# Patient Record
Sex: MALE | Race: WHITE | Employment: FULL TIME | URBAN - METROPOLITAN AREA
[De-identification: names, ages, dates, MRNs, and addresses within clinical notes are randomized per-mention and may not be internally consistent; named-entity substitution may affect disease eponyms.]

---

## 2017-01-05 RX ORDER — LOSARTAN POTASSIUM 50 MG/1
TABLET ORAL
Qty: 30 TABLET | Refills: 0 | Status: SHIPPED | OUTPATIENT
Start: 2017-01-05 | End: 2017-03-02 | Stop reason: SDUPTHER

## 2017-03-02 ENCOUNTER — OFFICE VISIT (OUTPATIENT)
Dept: FAMILY MEDICINE CLINIC | Age: 52
End: 2017-03-02

## 2017-03-02 VITALS
RESPIRATION RATE: 16 BRPM | DIASTOLIC BLOOD PRESSURE: 84 MMHG | HEIGHT: 69 IN | WEIGHT: 252.38 LBS | HEART RATE: 100 BPM | BODY MASS INDEX: 37.38 KG/M2 | OXYGEN SATURATION: 97 % | SYSTOLIC BLOOD PRESSURE: 110 MMHG

## 2017-03-02 DIAGNOSIS — I10 ESSENTIAL HYPERTENSION: ICD-10-CM

## 2017-03-02 DIAGNOSIS — R73.9 HYPERGLYCEMIA: ICD-10-CM

## 2017-03-02 DIAGNOSIS — Z00.00 WELL ADULT EXAM: Primary | ICD-10-CM

## 2017-03-02 PROCEDURE — 99396 PREV VISIT EST AGE 40-64: CPT | Performed by: FAMILY MEDICINE

## 2017-03-02 RX ORDER — ALBUTEROL SULFATE 90 UG/1
2 AEROSOL, METERED RESPIRATORY (INHALATION) 4 TIMES DAILY PRN
Qty: 3 INHALER | Refills: 1 | Status: SHIPPED | OUTPATIENT
Start: 2017-03-02 | End: 2018-05-07 | Stop reason: SDUPTHER

## 2017-03-02 RX ORDER — LOSARTAN POTASSIUM 50 MG/1
TABLET ORAL
Qty: 30 TABLET | Refills: 11 | Status: SHIPPED | OUTPATIENT
Start: 2017-03-02 | End: 2018-03-03 | Stop reason: SDUPTHER

## 2018-02-06 ENCOUNTER — HOSPITAL ENCOUNTER (OUTPATIENT)
Dept: OTHER | Age: 53
Discharge: HOME | End: 2018-02-06
Attending: FAMILY MEDICINE | Admitting: FAMILY MEDICINE

## 2018-02-06 LAB
A/G RATIO: 1.3 (ref 1.1–2.2)
ALBUMIN SERPL-MCNC: 4.1 G/DL (ref 3.4–5)
ALP BLD-CCNC: 65 U/L (ref 40–129)
ALT SERPL-CCNC: 45 U/L (ref 10–40)
ANION GAP SERPL CALCULATED.3IONS-SCNC: 11 MMOL/L (ref 3–16)
AST SERPL-CCNC: 24 U/L (ref 15–37)
BILIRUB SERPL-MCNC: 0.6 MG/DL (ref 0–1)
BUN BLDV-MCNC: 16 MG/DL (ref 7–20)
CALCIUM SERPL-MCNC: 9.5 MG/DL (ref 8.3–10.6)
CHLORIDE BLD-SCNC: 99 MMOL/L (ref 99–110)
CHOLESTEROL, TOTAL: 165 MG/DL (ref 0–199)
CO2: 28 MMOL/L (ref 21–32)
CREAT SERPL-MCNC: 0.9 MG/DL (ref 0.9–1.3)
ESTIMATED AVERAGE GLUCOSE: 257.5 MG/DL
GFR AFRICAN AMERICAN: >60
GFR NON-AFRICAN AMERICAN: >60
GLOBULIN: 3.2 G/DL
GLUCOSE BLD-MCNC: 387 MG/DL (ref 70–99)
HBA1C MFR BLD: 10.6 %
HDLC SERPL-MCNC: 27 MG/DL (ref 40–60)
LDL CHOLESTEROL CALCULATED: 87 MG/DL
POTASSIUM SERPL-SCNC: 3.9 MMOL/L (ref 3.5–5.1)
SODIUM BLD-SCNC: 138 MMOL/L (ref 136–145)
TOTAL PROTEIN: 7.3 G/DL (ref 6.4–8.2)
TRIGL SERPL-MCNC: 255 MG/DL (ref 0–150)
VLDLC SERPL CALC-MCNC: 51 MG/DL

## 2018-03-03 ENCOUNTER — OFFICE VISIT (OUTPATIENT)
Dept: FAMILY MEDICINE CLINIC | Age: 53
End: 2018-03-03

## 2018-03-03 VITALS
HEART RATE: 84 BPM | HEIGHT: 68 IN | DIASTOLIC BLOOD PRESSURE: 84 MMHG | OXYGEN SATURATION: 98 % | WEIGHT: 258 LBS | BODY MASS INDEX: 39.1 KG/M2 | SYSTOLIC BLOOD PRESSURE: 128 MMHG

## 2018-03-03 DIAGNOSIS — E11.9 TYPE 2 DIABETES MELLITUS WITHOUT COMPLICATION, WITHOUT LONG-TERM CURRENT USE OF INSULIN (HCC): ICD-10-CM

## 2018-03-03 DIAGNOSIS — I10 ESSENTIAL HYPERTENSION: Primary | ICD-10-CM

## 2018-03-03 DIAGNOSIS — G47.30 SLEEP APNEA, UNSPECIFIED TYPE: ICD-10-CM

## 2018-03-03 PROCEDURE — 99214 OFFICE O/P EST MOD 30 MIN: CPT | Performed by: FAMILY MEDICINE

## 2018-03-03 RX ORDER — METFORMIN HYDROCHLORIDE 500 MG/1
1000 TABLET, EXTENDED RELEASE ORAL
Qty: 60 TABLET | Refills: 5 | Status: SHIPPED | OUTPATIENT
Start: 2018-03-03 | End: 2018-04-24 | Stop reason: DRUGHIGH

## 2018-03-03 RX ORDER — LOSARTAN POTASSIUM 50 MG/1
TABLET ORAL
Qty: 30 TABLET | Refills: 11 | Status: SHIPPED | OUTPATIENT
Start: 2018-03-03 | End: 2018-04-06 | Stop reason: SDUPTHER

## 2018-03-03 NOTE — PROGRESS NOTES
Subjective:      Patient ID: Elisabeth Rucker is a 50 y.o. male. HPI patient presents today for his annual physical and medication check up. Patient is very compliant with the blood pressure medication. He occasionally uses an albuterol inhaler. Patient intimately uses tramadol and Naprosyn for back pain. Patient is currently working 60-70 hours a week. Patient has temporary employment status. He is walking a fair amount with his job 3-4 miles every night. Patient denies any chest pain or shortness of breath symptoms. Review of Systems   Patient Active Problem List   Diagnosis    Essential hypertension    Obesity    Sleep apnea    Hyperglycemia        Outpatient Prescriptions Marked as Taking for the 3/3/18 encounter (Office Visit) with Sergio Brasher MD   Medication Sig Dispense Refill           losartan (COZAAR) 50 MG tablet TAKE ONE TABLET BY MOUTH DAILY 30 tablet 11    traMADol (ULTRAM) 50 MG tablet Take 1 tablet by mouth every 8 hours as needed for Pain 5 tablet 0    albuterol sulfate HFA (PROAIR HFA) 108 (90 BASE) MCG/ACT inhaler Inhale 2 puffs into the lungs 4 times daily as needed for Wheezing 3 Inhaler 1    naproxen (NAPROSYN) 500 MG tablet Take 1 tablet by mouth 2 times daily (with meals) 60 tablet 5    glucose blood VI test strips (ONE TOUCH ULTRA TEST) strip Tests once daily 100 each 3    cyclobenzaprine (FLEXERIL) 10 MG tablet Take 1 tablet by mouth 3 times daily as needed. 90 tablet 0    Blood Glucose Monitoring Suppl (ONE TOUCH ULTRA) by Does not apply route.  famotidine (PEPCID) 20 MG tablet Take 20 mg by mouth daily.            Allergies   Allergen Reactions    Ace Inhibitors      COUGH      Pollen Extract        Social History   Substance Use Topics    Smoking status: Never Smoker    Smokeless tobacco: Never Used    Alcohol use Yes      Comment: occasional alcholo use       /84   Pulse 84   Ht 5' 8\" (1.727 m)   Wt 258 lb (117 kg)   SpO2 98%   BMI 39.23

## 2018-03-03 NOTE — PATIENT INSTRUCTIONS
good, quick way to make sure that you have a balanced meal. It also helps you spread carbs throughout the day. ¨ Divide your plate by types of foods. Put non-starchy vegetables on half the plate, meat or other protein food on one-quarter of the plate, and a grain or starchy vegetable in the final quarter of the plate. To this you can add a small piece of fruit and 1 cup of milk or yogurt, depending on how many carbs you are supposed to eat at a meal.  · Try to eat about the same amount of carbs at each meal. Do not \"save up\" your daily allowance of carbs to eat at one meal.  · Proteins have very little or no carbs per serving. Examples of proteins are beef, chicken, turkey, fish, eggs, tofu, cheese, cottage cheese, and peanut butter. A serving size of meat is 3 ounces, which is about the size of a deck of cards. Examples of meat substitute serving sizes (equal to 1 ounce of meat) are 1/4 cup of cottage cheese, 1 egg, 1 tablespoon of peanut butter, and ½ cup of tofu. How can you eat out and still eat healthy? · Learn to estimate the serving sizes of foods that have carbohydrate. If you measure food at home, it will be easier to estimate the amount in a serving of restaurant food. · If the meal you order has too much carbohydrate (such as potatoes, corn, or baked beans), ask to have a low-carbohydrate food instead. Ask for a salad or green vegetables. · If you use insulin, check your blood sugar before and after eating out to help you plan how much to eat in the future. · If you eat more carbohydrate at a meal than you had planned, take a walk or do other exercise. This will help lower your blood sugar. What else should you know? · Limit saturated fat, such as the fat from meat and dairy products. This is a healthy choice because people who have diabetes are at higher risk of heart disease. So choose lean cuts of meat and nonfat or low-fat dairy products.  Use olive or canola oil instead of butter or shortening test?  The health professional taking a sample of your blood will:  · Wrap an elastic band around your upper arm. This makes the veins below the band larger so it is easier to put a needle into the vein. · Clean the needle site with alcohol. · Put the needle into the vein. · Attach a tube to the needle to fill it with blood. · Remove the band from your arm when enough blood is collected. · Put a gauze pad or cotton ball over the needle site as the needle is removed. · Put pressure on the site and then put on a bandage. What else should you know about the test?  The test result is usually given as a percentage. The normal A1c is less than 5.7%. You have a higher risk for diabetes if your A1c is 5.7% to 6.4%. If your level is 6.5% or higher, you have diabetes. The A1c test result also can be used to find your estimated average glucose, or eAG. Your eAG and A1c show the same thing in two different ways. They both help you learn more about your average blood sugar range over the past 2 to 3 months. A1c is shown as a percentage, while eAG uses the same units (mg/dl) as your glucose meter. Examples:  · 6% A1c = 126 mg/dL  · 7% A1c = 154 mg/dL  · 8% A1c = 183 mg/dL  · 9% A1c = 212 mg/dL  · 10% A1c = 240 mg/dL  · 11% A1c = 269 mg/dL  · 12% A1c = 298 mg/dL  Where can you learn more? Go to [email or web address] and sign in to your [employer] account. Enter U216 in the [facility] box to learn more about \"Hemoglobin A1c: About This Test.\"     If you do not have an account, please click on the \"Sign Up Now\" link. Current as of: March 13, 2017  Content Version: 11.5  © 9125-2858 [employer]wise, [employer]orporated. Care instructions adapted under license by [facility] ([facility]). If you have questions about a medical condition or this instruction, always ask your healthcare professional. [place] [address] any warranty or liability for your use of this information.

## 2018-04-06 RX ORDER — LOSARTAN POTASSIUM 50 MG/1
TABLET ORAL
Qty: 30 TABLET | Refills: 11 | Status: SHIPPED | OUTPATIENT
Start: 2018-04-06 | End: 2019-04-15 | Stop reason: SDUPTHER

## 2018-04-24 ENCOUNTER — TELEPHONE (OUTPATIENT)
Dept: FAMILY MEDICINE CLINIC | Age: 53
End: 2018-04-24

## 2018-04-24 RX ORDER — GLIMEPIRIDE 2 MG/1
2 TABLET ORAL EVERY MORNING
Qty: 30 TABLET | Refills: 0 | Status: SHIPPED | OUTPATIENT
Start: 2018-04-24 | End: 2018-05-22 | Stop reason: SDUPTHER

## 2018-04-24 RX ORDER — METFORMIN HYDROCHLORIDE 500 MG/1
500 TABLET, EXTENDED RELEASE ORAL
Qty: 30 TABLET | Refills: 5 | Status: SHIPPED | OUTPATIENT
Start: 2018-04-24 | End: 2018-12-13 | Stop reason: SDUPTHER

## 2018-04-25 ENCOUNTER — TELEPHONE (OUTPATIENT)
Dept: FAMILY MEDICINE CLINIC | Age: 53
End: 2018-04-25

## 2018-05-07 RX ORDER — ALBUTEROL SULFATE 90 UG/1
2 AEROSOL, METERED RESPIRATORY (INHALATION) 4 TIMES DAILY PRN
Qty: 3 INHALER | Refills: 2 | Status: SHIPPED | OUTPATIENT
Start: 2018-05-07

## 2018-05-22 RX ORDER — GLIMEPIRIDE 2 MG/1
2 TABLET ORAL EVERY MORNING
Qty: 30 TABLET | Refills: 3 | Status: SHIPPED | OUTPATIENT
Start: 2018-05-22 | End: 2018-11-05 | Stop reason: SDUPTHER

## 2018-07-05 ENCOUNTER — OFFICE VISIT (OUTPATIENT)
Dept: FAMILY MEDICINE CLINIC | Age: 53
End: 2018-07-05

## 2018-07-05 VITALS
OXYGEN SATURATION: 97 % | HEART RATE: 86 BPM | DIASTOLIC BLOOD PRESSURE: 72 MMHG | WEIGHT: 251 LBS | SYSTOLIC BLOOD PRESSURE: 108 MMHG | BODY MASS INDEX: 38.16 KG/M2

## 2018-07-05 DIAGNOSIS — J45.20 MILD INTERMITTENT ASTHMA WITHOUT COMPLICATION: ICD-10-CM

## 2018-07-05 DIAGNOSIS — I10 ESSENTIAL HYPERTENSION: ICD-10-CM

## 2018-07-05 DIAGNOSIS — E11.9 TYPE 2 DIABETES MELLITUS WITHOUT COMPLICATION, WITHOUT LONG-TERM CURRENT USE OF INSULIN (HCC): Primary | ICD-10-CM

## 2018-07-05 PROCEDURE — 99214 OFFICE O/P EST MOD 30 MIN: CPT | Performed by: FAMILY MEDICINE

## 2018-07-05 ASSESSMENT — PATIENT HEALTH QUESTIONNAIRE - PHQ9
2. FEELING DOWN, DEPRESSED OR HOPELESS: 0
SUM OF ALL RESPONSES TO PHQ9 QUESTIONS 1 & 2: 0
SUM OF ALL RESPONSES TO PHQ QUESTIONS 1-9: 0
1. LITTLE INTEREST OR PLEASURE IN DOING THINGS: 0

## 2018-07-05 NOTE — PROGRESS NOTES
laboratory profiling. Maintain albuterol inhaler on a when necessary basis for asthma. Patient received counseling on the following healthy behaviors: nutrition and exercise     Patient given educational materials     Health maintenance updated    Discussed use, benefit, and side effects of prescribed medications. Barriers to medication compliance addressed. All patient questions answered. Pt voiced understanding. Patient needs RTC in 4 months. Please note that this chart was generated using Dragon dictation software. Although every effort was made to ensure the accuracy of this automated transcription, some errors in transcription may have occurred.

## 2018-11-05 RX ORDER — GLIMEPIRIDE 2 MG/1
2 TABLET ORAL EVERY MORNING
Qty: 30 TABLET | Refills: 1 | Status: SHIPPED | OUTPATIENT
Start: 2018-11-05 | End: 2018-12-13 | Stop reason: SDUPTHER

## 2018-12-13 ENCOUNTER — OFFICE VISIT (OUTPATIENT)
Dept: FAMILY MEDICINE CLINIC | Age: 53
End: 2018-12-13
Payer: COMMERCIAL

## 2018-12-13 VITALS
HEART RATE: 92 BPM | OXYGEN SATURATION: 97 % | SYSTOLIC BLOOD PRESSURE: 112 MMHG | HEIGHT: 70 IN | BODY MASS INDEX: 37.56 KG/M2 | DIASTOLIC BLOOD PRESSURE: 90 MMHG | RESPIRATION RATE: 20 BRPM | WEIGHT: 262.38 LBS

## 2018-12-13 DIAGNOSIS — G47.30 SLEEP APNEA, UNSPECIFIED TYPE: ICD-10-CM

## 2018-12-13 DIAGNOSIS — I10 ESSENTIAL HYPERTENSION: ICD-10-CM

## 2018-12-13 DIAGNOSIS — E11.9 TYPE 2 DIABETES MELLITUS WITHOUT COMPLICATION, WITHOUT LONG-TERM CURRENT USE OF INSULIN (HCC): Primary | ICD-10-CM

## 2018-12-13 PROCEDURE — 99214 OFFICE O/P EST MOD 30 MIN: CPT | Performed by: FAMILY MEDICINE

## 2018-12-13 RX ORDER — GLIMEPIRIDE 2 MG/1
2 TABLET ORAL EVERY MORNING
Qty: 30 TABLET | Refills: 5 | Status: SHIPPED | OUTPATIENT
Start: 2018-12-13 | End: 2019-10-15 | Stop reason: SDUPTHER

## 2018-12-13 RX ORDER — METFORMIN HYDROCHLORIDE 500 MG/1
500 TABLET, EXTENDED RELEASE ORAL
Qty: 30 TABLET | Refills: 5 | Status: SHIPPED | OUTPATIENT
Start: 2018-12-13 | End: 2019-07-31 | Stop reason: SDUPTHER

## 2018-12-13 NOTE — PROGRESS NOTES
Subjective:      Patient ID: Trisha Soto is a 48 y.o. male. CC: Patient presents for re-evaluation of chronic health problems including diabetes mellitus, hypertension, sleep apnea and asthma. HPI pt is here for a follow up on his diabetes, med refill. Patient states he is currently unemployed and going to school. But it is tight and therefore is not his laboratory evaluation performed as of yet. He denies any issues with asthma for quite some time. Patient does use CPAP for sleep apnea. Eye exam current (within one year): Yes    Checks sugars at home: yes  Home blood sugar records: patient tests 1 time(s) per day  Any episodes of hypoglycemia? No    Current medication use: taking as prescribed  Medication side effects: none     Current diet: in general, an \"unhealthy\" diet  Current exercise: Moderaly active     Review of Systems  Patient Active Problem List   Diagnosis    Essential hypertension    Obesity    Sleep apnea    Type 2 diabetes mellitus without complication, without long-term current use of insulin ([address]    Mild intermittent asthma without complication       Outpatient Prescriptions Marked as Taking for the 12/13/18 encounter (Office Visit) with Bob Mg MD   Medication Sig Dispense Refill    glimepiride (AMARYL) 2 MG tablet Take 1 tablet by mouth every morning 30 tablet 1    albuterol sulfate HFA (PROAIR HFA) 108 (90 Base) MCG/ACT inhaler Inhale 2 puffs into the lungs 4 times daily as needed for Wheezing 3 Inhaler 2    metFORMIN (GLUCOPHAGE XR) 500 MG extended release tablet Take 1 tablet by mouth daily (with breakfast) 30 tablet 5    losartan (COZAAR) 50 MG tablet TAKE ONE TABLET BY MOUTH DAILY 30 tablet 11    traMADol (ULTRAM) 50 MG tablet Take 1 tablet by mouth every 8 hours as needed for Pain 5 tablet 0    naproxen (NAPROSYN) 500 MG tablet Take 1 tablet by mouth 2 times daily (with meals) (Patient taking differently: Take 500 mg by mouth 2 times daily as needed ) 60 management of diabetes mellitus after laboratory testing. Patient received counseling on the following healthy behaviors: nutrition and exercise and weight loss    Patient given educational materials     Health maintenance updated    Discussed use, benefit, and side effects of prescribed medications. Barriers to medication compliance addressed. All patient questions answered. Pt voiced understanding. Patient needs RTC in 4 months. Please note that this chart was generated using Dragon dictation software. Although every effort was made to ensure the accuracy of this automated transcription, some errors in transcription may have occurred.             Saintclair Alcide

## 2019-04-02 ENCOUNTER — OFFICE VISIT (OUTPATIENT)
Dept: FAMILY MEDICINE CLINIC | Age: 54
End: 2019-04-02

## 2019-04-02 VITALS
TEMPERATURE: 99 F | DIASTOLIC BLOOD PRESSURE: 78 MMHG | SYSTOLIC BLOOD PRESSURE: 130 MMHG | BODY MASS INDEX: 37.28 KG/M2 | OXYGEN SATURATION: 99 % | RESPIRATION RATE: 16 BRPM | WEIGHT: 256.13 LBS | HEART RATE: 75 BPM

## 2019-04-02 DIAGNOSIS — R10.9 ABDOMINAL CRAMPING: ICD-10-CM

## 2019-04-02 DIAGNOSIS — R11.2 NON-INTRACTABLE VOMITING WITH NAUSEA, UNSPECIFIED VOMITING TYPE: Primary | ICD-10-CM

## 2019-04-02 PROCEDURE — 96372 THER/PROPH/DIAG INJ SC/IM: CPT | Performed by: PHYSICIAN ASSISTANT

## 2019-04-02 PROCEDURE — 99213 OFFICE O/P EST LOW 20 MIN: CPT | Performed by: PHYSICIAN ASSISTANT

## 2019-04-02 RX ORDER — PROMETHAZINE HYDROCHLORIDE 25 MG/ML
25 INJECTION, SOLUTION INTRAMUSCULAR; INTRAVENOUS ONCE
Status: COMPLETED | OUTPATIENT
Start: 2019-04-02 | End: 2019-04-02

## 2019-04-02 RX ORDER — HYOSCYAMINE SULFATE 0.125 MG
125 TABLET ORAL EVERY 4 HOURS PRN
Qty: 50 TABLET | Refills: 3 | Status: SHIPPED | OUTPATIENT
Start: 2019-04-02

## 2019-04-02 RX ORDER — PROMETHAZINE HYDROCHLORIDE 25 MG/ML
25 INJECTION, SOLUTION INTRAMUSCULAR; INTRAVENOUS ONCE
Qty: 1 ML | Refills: 0
Start: 2019-04-02 | End: 2019-04-02

## 2019-04-02 RX ORDER — ONDANSETRON 4 MG/1
4 TABLET, ORALLY DISINTEGRATING ORAL EVERY 8 HOURS PRN
Qty: 25 TABLET | Refills: 1 | Status: SHIPPED | OUTPATIENT
Start: 2019-04-02 | End: 2019-04-15

## 2019-04-02 RX ADMIN — PROMETHAZINE HYDROCHLORIDE 25 MG: 25 INJECTION, SOLUTION INTRAMUSCULAR; INTRAVENOUS at 11:54

## 2019-04-02 ASSESSMENT — ENCOUNTER SYMPTOMS
ABDOMINAL PAIN: 1
DIARRHEA: 0
VOMITING: 1
NAUSEA: 1
SHORTNESS OF BREATH: 0
CONSTIPATION: 0
BACK PAIN: 0

## 2019-04-02 NOTE — PROGRESS NOTES
Subjective:      Patient ID: Uriah Sullivan is a 48 y.o. male. HPI  Patient is here today for a 2 day history of abdominal pain, n/v. He camped Saturday night into Sunday. Sunday evening when he got home, he started feeling nauseous. He started vomiting late Sunday night into Monday through this morning. He ate crackers, soda, nuts, turkey, cheese, water. No fever. No diarrhea. His last BM was yesterday afternoon and reports that it was normal. No black or bloody stool. He as periodic abdominal cramping as well. Review of Systems   Constitutional: Negative for appetite change, chills and fever. Respiratory: Negative for shortness of breath. Cardiovascular: Negative for chest pain. Gastrointestinal: Positive for abdominal pain, nausea and vomiting. Negative for constipation and diarrhea. Genitourinary: Negative for difficulty urinating, dysuria, frequency, hematuria and urgency. Musculoskeletal: Negative for back pain. Objective:   Physical Exam   Constitutional: He is oriented to person, place, and time. Vital signs are normal. He appears well-developed and well-nourished. He is cooperative. Cardiovascular: Normal rate, regular rhythm and normal heart sounds. Pulmonary/Chest: Effort normal and breath sounds normal. He has no decreased breath sounds. He has no wheezes. He has no rhonchi. He has no rales. Abdominal: Soft. Normal appearance and bowel sounds are normal. He exhibits no distension and no mass. There is no hepatosplenomegaly. There is generalized tenderness. There is no rigidity, no rebound, no guarding and no CVA tenderness. No hernia. Neurological: He is alert and oriented to person, place, and time. Assessment:      Deonna Silverman was seen today for constipation, abdominal cramping and emesis. Diagnoses and all orders for this visit:    Non-intractable vomiting with nausea, unspecified vomiting type  -     ondansetron (ZOFRAN ODT) 4 MG disintegrating tablet;  Take 1 tablet by mouth every 8 hours as needed for Nausea or Vomiting  -     hyoscyamine (LEVSIN) 125 MCG tablet; Take 1 tablet by mouth every 4 hours as needed for Cramping    Abdominal cramping  -     hyoscyamine (LEVSIN) 125 MCG tablet; Take 1 tablet by mouth every 4 hours as needed for Cramping  -     promethazine (PHENERGAN) 25 MG/ML injection; Inject 1 mL into the muscle once for 1 dose             Plan:          Symptomatic treatment, fluids then advance to bland diet once keeping fluids down, call with any concerns.      She Quach

## 2019-04-02 NOTE — PATIENT INSTRUCTIONS
Matthew Brown was seen today for constipation, abdominal cramping and emesis. Diagnoses and all orders for this visit:    Non-intractable vomiting with nausea, unspecified vomiting type  -     ondansetron (ZOFRAN ODT) 4 MG disintegrating tablet; Take 1 tablet by mouth every 8 hours as needed for Nausea or Vomiting  -     hyoscyamine (LEVSIN) 125 MCG tablet; Take 1 tablet by mouth every 4 hours as needed for Cramping    Abdominal cramping  -     hyoscyamine (LEVSIN) 125 MCG tablet; Take 1 tablet by mouth every 4 hours as needed for Cramping  -     promethazine (PHENERGAN) 25 MG/ML injection; Inject 1 mL into the muscle once for 1 dose          Liquids (water, gatorade, pedialyte), no soda. Then once you are hungry then just bland food for now. This will last 2-5 days.

## 2019-04-15 ENCOUNTER — OFFICE VISIT (OUTPATIENT)
Dept: FAMILY MEDICINE CLINIC | Age: 54
End: 2019-04-15

## 2019-04-15 VITALS
HEART RATE: 110 BPM | WEIGHT: 258.38 LBS | OXYGEN SATURATION: 97 % | SYSTOLIC BLOOD PRESSURE: 142 MMHG | RESPIRATION RATE: 16 BRPM | BODY MASS INDEX: 37.61 KG/M2 | DIASTOLIC BLOOD PRESSURE: 92 MMHG

## 2019-04-15 DIAGNOSIS — J45.20 MILD INTERMITTENT ASTHMA WITHOUT COMPLICATION: ICD-10-CM

## 2019-04-15 DIAGNOSIS — I10 ESSENTIAL HYPERTENSION: ICD-10-CM

## 2019-04-15 DIAGNOSIS — G47.30 SLEEP APNEA, UNSPECIFIED TYPE: ICD-10-CM

## 2019-04-15 DIAGNOSIS — E11.9 TYPE 2 DIABETES MELLITUS WITHOUT COMPLICATION, WITHOUT LONG-TERM CURRENT USE OF INSULIN (HCC): Primary | ICD-10-CM

## 2019-04-15 DIAGNOSIS — M54.12 CERVICAL RADICULOPATHY: ICD-10-CM

## 2019-04-15 PROCEDURE — 99214 OFFICE O/P EST MOD 30 MIN: CPT | Performed by: FAMILY MEDICINE

## 2019-04-15 RX ORDER — LOSARTAN POTASSIUM 50 MG/1
TABLET ORAL
Qty: 30 TABLET | Refills: 11 | Status: SHIPPED | OUTPATIENT
Start: 2019-04-15 | End: 2020-07-20

## 2019-04-15 ASSESSMENT — PATIENT HEALTH QUESTIONNAIRE - PHQ9
SUM OF ALL RESPONSES TO PHQ QUESTIONS 1-9: 0
1. LITTLE INTEREST OR PLEASURE IN DOING THINGS: 0
2. FEELING DOWN, DEPRESSED OR HOPELESS: 0
SUM OF ALL RESPONSES TO PHQ QUESTIONS 1-9: 0
SUM OF ALL RESPONSES TO PHQ9 QUESTIONS 1 & 2: 0

## 2019-04-15 NOTE — PROGRESS NOTES
Subjective:      Patient ID: Maria Isabel Peraza is a 48 y.o. male. CC: Patient presents for re-evaluation of chronic health problems including , hypertension, sleep apnea and right shoulder pain. HPI Pt is here for a follow up, med refill, and will like to discuss right shoulder pain, ongoing for 4 months. Pt states this is a stabbing pain if moved the wrong way, stretching bothers him. Pt has been taking tylenol with moderate relief. Patient is not his laboratory testing done as he states he simply couldn't afford to get it done. He carries a heavy pack on his right shoulder and thinks this causes pain. He had difficulty with his neck being very stiff and sore. Sometimes the discomfort will extend down to his little finger. Patient states his symptoms have improved and he's been taking ibuprofen on an intermittent basis. Patient does wear CPAP for sleep apnea. Patient is very compliant with medications with no adverse reactions. Eye exam current (within one year): Yes    Checks sugars at home: yes  Home blood sugar records: patient tests 1 time(s) per day avg 117-180  Any episodes of hypoglycemia?  No    Current medication use: taking as prescribed  Medication side effects: none     Current diet: in general, an \"unhealthy\" diet  Current exercise:moderately active     Review of Systems  Patient Active Problem List   Diagnosis    Essential hypertension    Obesity    Sleep apnea    Type 2 diabetes mellitus without complication, without long-term current use of insulin (HCC)    Mild intermittent asthma without complication       Outpatient Medications Marked as Taking for the 4/15/19 encounter (Office Visit) with Pee Amaro MD   Medication Sig Dispense Refill    ondansetron (ZOFRAN ODT) 4 MG disintegrating tablet Take 1 tablet by mouth every 8 hours as needed for Nausea or Vomiting 25 tablet 1    hyoscyamine (LEVSIN) 125 MCG tablet Take 1 tablet by mouth every 4 hours as needed for Cramping 50 tablet 3    glimepiride (AMARYL) 2 MG tablet Take 1 tablet by mouth every morning 30 tablet 5    metFORMIN (GLUCOPHAGE XR) 500 MG extended release tablet Take 1 tablet by mouth daily (with breakfast) 30 tablet 5    albuterol sulfate HFA (PROAIR HFA) 108 (90 Base) MCG/ACT inhaler Inhale 2 puffs into the lungs 4 times daily as needed for Wheezing 3 Inhaler 2    losartan (COZAAR) 50 MG tablet TAKE ONE TABLET BY MOUTH DAILY 30 tablet 11    traMADol (ULTRAM) 50 MG tablet Take 1 tablet by mouth every 8 hours as needed for Pain 5 tablet 0    naproxen (NAPROSYN) 500 MG tablet Take 1 tablet by mouth 2 times daily (with meals) (Patient taking differently: Take 500 mg by mouth 2 times daily as needed ) 60 tablet 5    glucose blood VI test strips (ONE TOUCH ULTRA TEST) strip Tests once daily 100 each 3    Blood Glucose Monitoring Suppl (ONE TOUCH ULTRA) by Does not apply route.  famotidine (PEPCID) 20 MG tablet Take 20 mg by mouth daily. Allergies   Allergen Reactions    Ace Inhibitors      COUGH      Pollen Extract        Social History     Tobacco Use    Smoking status: Never Smoker    Smokeless tobacco: Never Used   Substance Use Topics    Alcohol use: Yes     Comment: occasional alcholo use       BP (!) 142/92 (Site: Right Upper Arm, Position: Sitting, Cuff Size: Medium Adult)   Pulse 110   Resp 16   Wt 258 lb 6 oz (117.2 kg)   SpO2 97%   BMI 37.61 kg/m²     Objective:   Physical Exam   Constitutional: He is oriented to person, place, and time. He appears well-developed and well-nourished. He is cooperative. No distress. Neck: Carotid bruit is not present. Cardiovascular: Normal rate, regular rhythm, normal heart sounds and intact distal pulses. No murmur heard. Pulses:       Dorsalis pedis pulses are 2+ on the right side, and 2+ on the left side. Posterior tibial pulses are 2+ on the right side, and 2+ on the left side.    Pulmonary/Chest: Effort normal and breath sounds normal.   Musculoskeletal: Normal range of motion. He exhibits no edema. Right shoulder: He exhibits no tenderness, no swelling and no deformity. Left shoulder: He exhibits no tenderness, no swelling and no deformity. Cervical back: He exhibits spasm. He exhibits no swelling and no deformity. Right foot: Normal.        Left foot: Normal.   Neurological: He is alert and oriented to person, place, and time. He has normal strength and normal reflexes. No sensory deficit. Reflex Scores:       Tricep reflexes are 2+ on the right side and 2+ on the left side. Bicep reflexes are 2+ on the right side and 2+ on the left side. 12 point monofilament test normal    Skin: Skin is warm. No rash noted. Back Exam     Tenderness   The patient is experiencing tenderness in the cervical.    Range of Motion   Extension: normal   Flexion: normal   Rotation right: normal   Rotation left: normal             Assessment:      Leah Arizmendi was seen today for follow-up. Diagnoses and all orders for this visit:    Type 2 diabetes mellitus without complication, without long-term current use of insulin (HCC)  -     Hemoglobin A1C; Future  -     Basic Metabolic Panel; Future    Essential hypertension    Mild intermittent asthma without complication    Sleep apnea, unspecified type    Cervical radiculopathy    Other orders  -     losartan (COZAAR) 50 MG tablet; TAKE ONE TABLET BY MOUTH DAILY            Plan:      Laboratory profiling ordered  No change to medication until after profile available  Continue using neck range of motion exercises and over-the-counter ibuprofen on a when necessary basis  Continue with CPAP for sleep apnea  RTC 6 months    Please note that this chart was generated using Dragon dictation software. Although every effort was made to ensure the accuracy of this automated transcription, some errors in transcription may have occurred.

## 2019-07-31 RX ORDER — METFORMIN HYDROCHLORIDE 500 MG/1
500 TABLET, EXTENDED RELEASE ORAL
Qty: 30 TABLET | Refills: 2 | Status: SHIPPED | OUTPATIENT
Start: 2019-07-31 | End: 2019-08-22 | Stop reason: SDUPTHER

## 2019-08-22 RX ORDER — METFORMIN HYDROCHLORIDE 500 MG/1
500 TABLET, EXTENDED RELEASE ORAL
Qty: 30 TABLET | Refills: 2 | Status: SHIPPED | OUTPATIENT
Start: 2019-08-22 | End: 2019-10-15 | Stop reason: SDUPTHER

## 2019-10-15 ENCOUNTER — HOSPITAL ENCOUNTER (OUTPATIENT)
Age: 54
Discharge: HOME | End: 2019-10-15
Payer: COMMERCIAL

## 2019-10-15 ENCOUNTER — OFFICE VISIT (OUTPATIENT)
Dept: FAMILY MEDICINE CLINIC | Age: 54
End: 2019-10-15
Payer: COMMERCIAL

## 2019-10-15 VITALS
WEIGHT: 265 LBS | SYSTOLIC BLOOD PRESSURE: 120 MMHG | DIASTOLIC BLOOD PRESSURE: 70 MMHG | BODY MASS INDEX: 38.57 KG/M2 | OXYGEN SATURATION: 97 % | HEART RATE: 97 BPM

## 2019-10-15 DIAGNOSIS — G47.30 SLEEP APNEA, UNSPECIFIED TYPE: ICD-10-CM

## 2019-10-15 DIAGNOSIS — E11.9 TYPE 2 DIABETES MELLITUS WITHOUT COMPLICATION, WITHOUT LONG-TERM CURRENT USE OF INSULIN (HCC): ICD-10-CM

## 2019-10-15 DIAGNOSIS — I10 ESSENTIAL HYPERTENSION: ICD-10-CM

## 2019-10-15 DIAGNOSIS — E11.9 TYPE 2 DIABETES MELLITUS WITHOUT COMPLICATION, WITHOUT LONG-TERM CURRENT USE OF INSULIN (HCC): Primary | ICD-10-CM

## 2019-10-15 DIAGNOSIS — J06.9 URI, ACUTE: ICD-10-CM

## 2019-10-15 LAB
A/G RATIO: 1.5 (ref 1.1–2.2)
ALBUMIN SERPL-MCNC: 4.5 G/DL (ref 3.4–5)
ALP BLD-CCNC: 49 U/L (ref 40–129)
ALT SERPL-CCNC: 49 U/L (ref 10–40)
ANION GAP SERPL CALCULATED.3IONS-SCNC: 14 MMOL/L (ref 3–16)
AST SERPL-CCNC: 30 U/L (ref 15–37)
BILIRUB SERPL-MCNC: 0.6 MG/DL (ref 0–1)
BUN BLDV-MCNC: 19 MG/DL (ref 7–20)
CALCIUM SERPL-MCNC: 9.9 MG/DL (ref 8.3–10.6)
CHLORIDE BLD-SCNC: 105 MMOL/L (ref 99–110)
CHOLESTEROL, TOTAL: 148 MG/DL (ref 0–199)
CO2: 24 MMOL/L (ref 21–32)
CREAT SERPL-MCNC: 1.2 MG/DL (ref 0.9–1.3)
GFR AFRICAN AMERICAN: >60
GFR NON-AFRICAN AMERICAN: >60
GLOBULIN: 3.1 G/DL
GLUCOSE BLD-MCNC: 141 MG/DL (ref 70–99)
HDLC SERPL-MCNC: 37 MG/DL (ref 40–60)
LDL CHOLESTEROL CALCULATED: 74 MG/DL
POTASSIUM SERPL-SCNC: 4.5 MMOL/L (ref 3.5–5.1)
SODIUM BLD-SCNC: 143 MMOL/L (ref 136–145)
TOTAL PROTEIN: 7.6 G/DL (ref 6.4–8.2)
TRIGL SERPL-MCNC: 186 MG/DL (ref 0–150)
VLDLC SERPL CALC-MCNC: 37 MG/DL

## 2019-10-15 PROCEDURE — 80053 COMPREHEN METABOLIC PANEL: CPT

## 2019-10-15 PROCEDURE — 80061 LIPID PANEL: CPT

## 2019-10-15 PROCEDURE — 83036 HEMOGLOBIN GLYCOSYLATED A1C: CPT

## 2019-10-15 PROCEDURE — 36415 COLL VENOUS BLD VENIPUNCTURE: CPT

## 2019-10-15 PROCEDURE — 99214 OFFICE O/P EST MOD 30 MIN: CPT | Performed by: FAMILY MEDICINE

## 2019-10-15 RX ORDER — GLIMEPIRIDE 2 MG/1
2 TABLET ORAL EVERY MORNING
Qty: 30 TABLET | Refills: 5 | Status: SHIPPED | OUTPATIENT
Start: 2019-10-15 | End: 2020-06-12

## 2019-10-15 RX ORDER — METFORMIN HYDROCHLORIDE 500 MG/1
500 TABLET, EXTENDED RELEASE ORAL
Qty: 30 TABLET | Refills: 5 | Status: SHIPPED | OUTPATIENT
Start: 2019-10-15 | End: 2020-05-18

## 2019-10-15 RX ORDER — HYOSCYAMINE SULFATE 0.125 MG
125 TABLET ORAL EVERY 4 HOURS PRN
Qty: 50 TABLET | Refills: 3 | Status: CANCELLED | OUTPATIENT
Start: 2019-10-15

## 2019-10-16 LAB
ESTIMATED AVERAGE GLUCOSE: 148.5 MG/DL
HBA1C MFR BLD: 6.8 %

## 2019-12-27 RX ORDER — VALSARTAN 80 MG/1
80 TABLET ORAL DAILY
Qty: 30 TABLET | Refills: 1 | Status: SHIPPED | OUTPATIENT
Start: 2019-12-27 | End: 2020-01-28

## 2020-01-28 RX ORDER — VALSARTAN 80 MG/1
TABLET ORAL
Qty: 30 TABLET | Refills: 2 | Status: SHIPPED | OUTPATIENT
Start: 2020-01-28 | End: 2020-10-02

## 2020-02-04 ENCOUNTER — OFFICE VISIT (OUTPATIENT)
Dept: FAMILY MEDICINE CLINIC | Age: 55
End: 2020-02-04
Payer: COMMERCIAL

## 2020-02-04 VITALS
TEMPERATURE: 100 F | BODY MASS INDEX: 37.7 KG/M2 | OXYGEN SATURATION: 96 % | WEIGHT: 259 LBS | HEART RATE: 126 BPM | DIASTOLIC BLOOD PRESSURE: 82 MMHG | SYSTOLIC BLOOD PRESSURE: 118 MMHG

## 2020-02-04 LAB
INFLUENZA A ANTIGEN, POC: NEGATIVE
INFLUENZA B ANTIGEN, POC: NEGATIVE

## 2020-02-04 PROCEDURE — 99213 OFFICE O/P EST LOW 20 MIN: CPT | Performed by: PHYSICIAN ASSISTANT

## 2020-02-04 PROCEDURE — 87804 INFLUENZA ASSAY W/OPTIC: CPT | Performed by: PHYSICIAN ASSISTANT

## 2020-02-04 RX ORDER — OSELTAMIVIR PHOSPHATE 75 MG/1
75 CAPSULE ORAL 2 TIMES DAILY
Qty: 10 CAPSULE | Refills: 0 | Status: SHIPPED | OUTPATIENT
Start: 2020-02-04 | End: 2020-02-09

## 2020-02-04 ASSESSMENT — ENCOUNTER SYMPTOMS
NAUSEA: 0
COUGH: 1
WHEEZING: 0
DIARRHEA: 0
RHINORRHEA: 1
EYE PAIN: 0
VOMITING: 0
SORE THROAT: 1

## 2020-02-04 NOTE — PROGRESS NOTES
Subjective:      Patient ID: Cecily Brittle is a 48 y.o. male. HPI Patient is here today with a 1 day history of body aches, dry cough, ST, fatigue, runny nose, fever. Grandson tested positive for flu this morning. No n/v/d. He has taken coricidin, mucinex and cough drops. He is a nonsmoker. Review of Systems   Constitutional: Positive for appetite change, fatigue and fever. HENT: Positive for congestion, rhinorrhea and sore throat. Negative for ear pain. Eyes: Negative for pain. Respiratory: Positive for cough. Negative for wheezing. Gastrointestinal: Negative for diarrhea, nausea and vomiting. Musculoskeletal: Positive for myalgias. Skin: Negative for rash. Neurological: Positive for headaches. Negative for dizziness. Objective:   Physical Exam  Vitals signs reviewed. Constitutional:       General: He is not in acute distress. Appearance: Normal appearance. He is well-developed and well-groomed. HENT:      Head: Normocephalic. Right Ear: Tympanic membrane and ear canal normal.      Left Ear: Tympanic membrane and ear canal normal.      Nose: Mucosal edema, congestion and rhinorrhea present. Right Sinus: No maxillary sinus tenderness or frontal sinus tenderness. Left Sinus: No maxillary sinus tenderness or frontal sinus tenderness. Mouth/Throat:      Pharynx: Oropharynx is clear. Uvula midline. Neck:      Musculoskeletal: Neck supple. Cardiovascular:      Rate and Rhythm: Normal rate and regular rhythm. Heart sounds: Normal heart sounds. Pulmonary:      Effort: Pulmonary effort is normal. No respiratory distress. Breath sounds: Normal breath sounds. Lymphadenopathy:      Cervical: No cervical adenopathy. Skin:     General: Skin is warm and dry. Neurological:      Mental Status: He is alert and oriented to person, place, and time. Psychiatric:         Behavior: Behavior is cooperative.          Assessment:      Pop Woodson was seen today for uri. Diagnoses and all orders for this visit:    Influenza  -     oseltamivir (TAMIFLU) 75 MG capsule; Take 1 capsule by mouth 2 times daily for 5 days  -     POCT Influenza A/B Antigen (BD Veritor)             Plan:      Treat fever prn, call with any concerns.          Anita Frankel, [place]

## 2020-05-18 RX ORDER — METFORMIN HYDROCHLORIDE 500 MG/1
TABLET, EXTENDED RELEASE ORAL
Qty: 30 TABLET | Refills: 0 | Status: SHIPPED | OUTPATIENT
Start: 2020-05-18 | End: 2020-06-02 | Stop reason: SDUPTHER

## 2020-06-02 RX ORDER — METFORMIN HYDROCHLORIDE 500 MG/1
TABLET, EXTENDED RELEASE ORAL
Qty: 90 TABLET | Refills: 0 | Status: SHIPPED | OUTPATIENT
Start: 2020-06-02 | End: 2020-08-31

## 2020-06-12 RX ORDER — GLIMEPIRIDE 2 MG/1
TABLET ORAL
Qty: 90 TABLET | Refills: 0 | Status: SHIPPED | OUTPATIENT
Start: 2020-06-12 | End: 2020-09-03

## 2020-07-20 RX ORDER — LOSARTAN POTASSIUM 50 MG/1
TABLET ORAL
Qty: 90 TABLET | Refills: 0 | Status: SHIPPED | OUTPATIENT
Start: 2020-07-20 | End: 2020-10-02 | Stop reason: SDUPTHER

## 2020-08-31 RX ORDER — METFORMIN HYDROCHLORIDE 500 MG/1
TABLET, EXTENDED RELEASE ORAL
Qty: 30 TABLET | Refills: 0 | Status: SHIPPED | OUTPATIENT
Start: 2020-08-31 | End: 2020-10-02 | Stop reason: SDUPTHER

## 2020-09-03 ENCOUNTER — TELEPHONE (OUTPATIENT)
Dept: FAMILY MEDICINE CLINIC | Age: 55
End: 2020-09-03

## 2020-09-04 RX ORDER — GLIMEPIRIDE 2 MG/1
TABLET ORAL
Qty: 30 TABLET | Refills: 0 | Status: SHIPPED | OUTPATIENT
Start: 2020-09-04 | End: 2020-10-02 | Stop reason: SDUPTHER

## 2020-09-25 ENCOUNTER — OFFICE VISIT (OUTPATIENT)
Dept: FAMILY MEDICINE CLINIC | Age: 55
End: 2020-09-25
Payer: COMMERCIAL

## 2020-09-25 ENCOUNTER — HOSPITAL ENCOUNTER (OUTPATIENT)
Age: 55
Discharge: HOME | End: 2020-09-25
Payer: COMMERCIAL

## 2020-09-25 ENCOUNTER — NURSE TRIAGE (OUTPATIENT)
Dept: OTHER | Facility: CLINIC | Age: 55
End: 2020-09-25

## 2020-09-25 VITALS
SYSTOLIC BLOOD PRESSURE: 116 MMHG | WEIGHT: 254 LBS | OXYGEN SATURATION: 98 % | TEMPERATURE: 99.3 F | BODY MASS INDEX: 36.97 KG/M2 | HEART RATE: 103 BPM | DIASTOLIC BLOOD PRESSURE: 74 MMHG

## 2020-09-25 LAB
ANION GAP SERPL CALCULATED.3IONS-SCNC: 12 MMOL/L (ref 3–16)
BACTERIA URINE, POC: ABNORMAL
BILIRUBIN URINE: ABNORMAL
BLOOD, URINE: POSITIVE
BUN BLDV-MCNC: 27 MG/DL (ref 7–20)
CALCIUM SERPL-MCNC: 9.5 MG/DL (ref 8.3–10.6)
CASTS URINE, POC: ABNORMAL
CHLORIDE BLD-SCNC: 103 MMOL/L (ref 99–110)
CLARITY: ABNORMAL
CO2: 24 MMOL/L (ref 21–32)
COLOR: ABNORMAL
CREAT SERPL-MCNC: 1.3 MG/DL (ref 0.9–1.3)
CRYSTALS URINE, POC: ABNORMAL
EPI CELLS URINE, POC: ABNORMAL
GFR AFRICAN AMERICAN: >60
GFR NON-AFRICAN AMERICAN: 58
GLUCOSE BLD-MCNC: 88 MG/DL (ref 70–99)
GLUCOSE URINE: ABNORMAL
KETONES, URINE: NEGATIVE
LEUKOCYTE EST, POC: ABNORMAL
NITRITE, URINE: NEGATIVE
PH UA: 5.5 (ref 4.5–8)
POTASSIUM SERPL-SCNC: 4.1 MMOL/L (ref 3.5–5.1)
PROTEIN UA: POSITIVE
RBC URINE, POC: ABNORMAL
SODIUM BLD-SCNC: 139 MMOL/L (ref 136–145)
SPECIFIC GRAVITY UA: 1.02 (ref 1–1.03)
UROBILINOGEN, URINE: NORMAL
WBC URINE, POC: ABNORMAL
YEAST URINE, POC: ABNORMAL

## 2020-09-25 PROCEDURE — 36415 COLL VENOUS BLD VENIPUNCTURE: CPT

## 2020-09-25 PROCEDURE — 99213 OFFICE O/P EST LOW 20 MIN: CPT | Performed by: FAMILY MEDICINE

## 2020-09-25 PROCEDURE — 81000 URINALYSIS NONAUTO W/SCOPE: CPT | Performed by: FAMILY MEDICINE

## 2020-09-25 PROCEDURE — 80048 BASIC METABOLIC PNL TOTAL CA: CPT

## 2020-09-25 PROCEDURE — 83036 HEMOGLOBIN GLYCOSYLATED A1C: CPT

## 2020-09-25 RX ORDER — CIPROFLOXACIN 500 MG/1
500 TABLET, FILM COATED ORAL 2 TIMES DAILY
Qty: 14 TABLET | Refills: 0 | Status: SHIPPED | OUTPATIENT
Start: 2020-09-25 | End: 2020-10-02

## 2020-09-25 ASSESSMENT — PATIENT HEALTH QUESTIONNAIRE - PHQ9
2. FEELING DOWN, DEPRESSED OR HOPELESS: 0
SUM OF ALL RESPONSES TO PHQ QUESTIONS 1-9: 0
1. LITTLE INTEREST OR PLEASURE IN DOING THINGS: 0
SUM OF ALL RESPONSES TO PHQ9 QUESTIONS 1 & 2: 0
SUM OF ALL RESPONSES TO PHQ QUESTIONS 1-9: 0

## 2020-09-25 NOTE — PATIENT INSTRUCTIONS
Patient Education        Blood in the Urine: Care Instructions  Your Care Instructions     Blood in the urine, or hematuria, may make the urine look red, brown, or pink. There may be blood every time you urinate or just from time to time. You cannot always see blood in the urine, but it will show up in a urine test.  Blood in the urine may be serious. It should always be checked by a doctor. Your doctor may recommend more tests, including an X-ray, a CT scan, or a cystoscopy (which lets a doctor look inside the urethra and bladder). Blood in the urine can be a sign of another problem. Common causes are bladder infections and kidney stones. An injury to your groin or your genital area can also cause bleeding in the urinary tract. Very hard exercise--such as running a marathon--can cause blood in the urine. Blood in the urine can also be a sign of kidney disease or cancer in the bladder or kidney. Many cases of blood in the urine are caused by a harmless condition that runs in families. This is called benign familial hematuria. It does not need any treatment. Sometimes your urine may look red or brown even though it does not contain blood. For example, not getting enough fluids (dehydration), taking certain medicines, or having a liver problem can change the color of your urine. Eating foods such as beets, rhubarb, or blackberries or foods with red food coloring can make your urine look red or pink. Follow-up care is a key part of your treatment and safety. Be sure to make and go to all appointments, and call your doctor if you are having problems. It's also a good idea to know your test results and keep a list of the medicines you take. When should you call for help? Call your doctor now or seek immediate medical care if:  · You have symptoms of a urinary infection. For example:  ? You have pus in your urine. ? You have pain in your back just below your rib cage. This is called flank pain. ?  You have a

## 2020-09-25 NOTE — TELEPHONE ENCOUNTER
Reason for Disposition   Pain or burning with passing urine (urination)    Answer Assessment - Initial Assessment Questions  1. COLOR of URINE: \"Describe the color of the urine. \"  (e.g., tea-colored, pink, red, blood clots, bloody)      At first was dark red, now appears pink  2. ONSET: \"When did the bleeding start? \"       9/24 around 7pm  3. EPISODES: \"How many times has there been blood in the urine? \" or \"How many times today?\"      4x  4. PAIN with URINATION: \"Is there any pain with passing your urine? \" If so, ask: \"How bad is the pain? \"  (Scale 1-10; or mild, moderate, severe)     - MILD - complains slightly about urination hurting     - MODERATE - interferes with normal activities       - SEVERE - excruciating, unwilling or unable to urinate because of the pain       2/10  5. FEVER: \"Do you have a fever? \" If so, ask: \"What is your temperature, how was it measured, and when did it start? \"      denies  6. ASSOCIATED SYMPTOMS: Luiza Muzzy you passing urine more frequently than usual?\"      Yes, urinary urgency  7. OTHER SYMPTOMS: \"Do you have any other symptoms? \" (e.g., back/flank pain, abdominal pain, vomiting)      Vomiting and diarrhea  8. PREGNANCY: \"Is there any chance you are pregnant? \" \"When was your last menstrual period? \"      no    Protocols used: URINE - BLOOD IN-ADULT-[place]    Caller provided care advice and instructed to call back with worsening symptoms. Spoke to Cristina Clinton to schedule apt.

## 2020-09-25 NOTE — PROGRESS NOTES
Subjective:      Patient ID: Berenice Fraga is a 50 y.o. male. CC: Patient presents for acute medical problem-grossly bloody urine. Medical assistant notes reviewed. HPI Patient presents with blood in his urine with some dysuria since yesterday. Patient is complain of some lower abdominal discomfort but no real severe pain. There is been no reported fevers or chills. Review of Systems     Allergies   Allergen Reactions    Ace Inhibitors      COUGH      Pollen Extract        Objective:   Physical Exam  Constitutional:       General: He is not in acute distress. Appearance: Normal appearance. He is well-developed. Abdominal:      General: Bowel sounds are normal. There is no distension. Palpations: Abdomen is soft. Abdomen is not rigid. There is no hepatomegaly, splenomegaly or mass. Tenderness: There is abdominal tenderness in the right lower quadrant, suprapubic area and left lower quadrant. There is no right CVA tenderness, left CVA tenderness, guarding or rebound. Hernia: No hernia is present. Skin:     General: Skin is warm. Findings: No rash. Neurological:      Mental Status: He is alert. Mental status is at baseline. Psychiatric:         Behavior: Behavior is cooperative. Assessment:      Lety Vazquez was seen today for hematuria. Diagnoses and all orders for this visit:    Gross hematuria    Dysuria  -     POC URINE with Microscopic    Type 2 diabetes mellitus without complication, without long-term current use of insulin (HCC)  -     Hemoglobin A1C; Future  -     Basic Metabolic Panel; Future    Other orders  -     ciprofloxacin (CIPRO) 500 MG tablet; Take 1 tablet by mouth 2 times daily for 7 days            Plan:      Patient was advised that we would treat his urine as an infection but he should be reassessed in the next 2 weeks if his urine continues to be grossly bloody.   We did discuss at that point in time he would need an abdominal CT and possible urology consultation      Patient is due for follow-up in regards to his diabetes management

## 2020-09-26 LAB
ESTIMATED AVERAGE GLUCOSE: 111.2 MG/DL
HBA1C MFR BLD: 5.5 %

## 2020-10-02 ENCOUNTER — OFFICE VISIT (OUTPATIENT)
Dept: FAMILY MEDICINE CLINIC | Age: 55
End: 2020-10-02
Payer: COMMERCIAL

## 2020-10-02 VITALS
HEIGHT: 69 IN | OXYGEN SATURATION: 98 % | DIASTOLIC BLOOD PRESSURE: 74 MMHG | BODY MASS INDEX: 36.73 KG/M2 | HEART RATE: 93 BPM | WEIGHT: 248 LBS | SYSTOLIC BLOOD PRESSURE: 132 MMHG | RESPIRATION RATE: 16 BRPM | TEMPERATURE: 98.4 F

## 2020-10-02 PROCEDURE — 99214 OFFICE O/P EST MOD 30 MIN: CPT | Performed by: FAMILY MEDICINE

## 2020-10-02 RX ORDER — GLIMEPIRIDE 2 MG/1
TABLET ORAL
Qty: 90 TABLET | Refills: 1 | Status: SHIPPED | OUTPATIENT
Start: 2020-10-02 | End: 2021-06-29

## 2020-10-02 RX ORDER — ASPIRIN 81 MG/1
81 TABLET ORAL DAILY
COMMUNITY

## 2020-10-02 RX ORDER — LOSARTAN POTASSIUM 50 MG/1
TABLET ORAL
Qty: 90 TABLET | Refills: 1 | Status: SHIPPED | OUTPATIENT
Start: 2020-10-02 | End: 2021-06-29

## 2020-10-02 RX ORDER — METFORMIN HYDROCHLORIDE 500 MG/1
500 TABLET, EXTENDED RELEASE ORAL DAILY
Qty: 90 TABLET | Refills: 1 | Status: SHIPPED | OUTPATIENT
Start: 2020-10-02 | End: 2021-06-01

## 2020-10-02 NOTE — PROGRESS NOTES
Subjective:      Patient ID: Olga Grider is a 50 y.o. male. CC: Patient presents for re-evaluation of chronic health problems including diabetes mellitus, hypertension, sleep apnea and asthma. HPI Pt is here for a follow up, med refill, test results. Patient was recently evaluated with gross hematuria and he states this is resolved and he was having lower abdominal discomfort this is also resolved. He has completed antibiotic therapy. Otherwise is been a year since he was evaluated for his medical problems. He does continue using CPAP for sleep apnea. He rarely has to use an inhaler. He denies any chest pain or shortness of breath with activity. Eye exam current (within one year): Yes    Checks sugars at home: yes  Home blood sugar records: patient tests 1 time(s) per day  Any episodes of hypoglycemia?  Yes    Current medication use: taking as prescribed  Medication side effects: none     Current diet: well balanced  Current exercise:very active    Review of Systems  Patient Active Problem List   Diagnosis    Essential hypertension    Obesity    Sleep apnea    Type 2 diabetes mellitus without complication, without long-term current use of insulin (HCC)    Mild intermittent asthma without complication       Outpatient Medications Marked as Taking for the 10/2/20 encounter (Office Visit) with Lubna Cedeno MD   Medication Sig Dispense Refill    aspirin 81 MG EC tablet Take 81 mg by mouth daily      ciprofloxacin (CIPRO) 500 MG tablet Take 1 tablet by mouth 2 times daily for 7 days 14 tablet 0    glimepiride (AMARYL) 2 MG tablet TAKE 1 TABLET BY MOUTH EVERY DAY IN THE MORNING 30 tablet 0    metFORMIN (GLUCOPHAGE-XR) 500 MG extended release tablet TAKE 1 TABLET BY MOUTH EVERY DAY WITH BREAKFAST 30 tablet 0    losartan (COZAAR) 50 MG tablet TAKE 1 TABLET BY MOUTH EVERY DAY 90 tablet 0    hyoscyamine (LEVSIN) 125 MCG tablet Take 1 tablet by mouth every 4 hours as needed for Cramping 50 tablet 3    albuterol sulfate HFA (PROAIR HFA) 108 (90 Base) MCG/ACT inhaler Inhale 2 puffs into the lungs 4 times daily as needed for Wheezing 3 Inhaler 2    naproxen (NAPROSYN) 500 MG tablet Take 1 tablet by mouth 2 times daily (with meals) (Patient taking differently: Take 500 mg by mouth 2 times daily as needed ) 60 tablet 5    glucose blood VI test strips (ONE TOUCH ULTRA TEST) strip Tests once daily 100 each 3    Blood Glucose Monitoring Suppl (ONE TOUCH ULTRA) by Does not apply route.  famotidine (PEPCID) 20 MG tablet Take 20 mg by mouth daily. Allergies   Allergen Reactions    Ace Inhibitors      COUGH      Pollen Extract        Social History     Tobacco Use    Smoking status: Never Smoker    Smokeless tobacco: Never Used   Substance Use Topics    Alcohol use: Yes     Comment: occasional alcholo use       /74 (Site: Right Upper Arm, Position: Sitting, Cuff Size: Large Adult)   Pulse 93   Temp 98.4 °F (36.9 °C) (Tympanic)   Resp 16   Ht 5' 9\" (1.753 m)   Wt 248 lb (112.5 kg)   SpO2 98%   BMI 36.62 kg/m²     Objective:   Physical Exam  Constitutional:       General: He is not in acute distress. Appearance: He is well-developed. Neck:      Vascular: No carotid bruit. Cardiovascular:      Rate and Rhythm: Normal rate and regular rhythm. Pulses:           Dorsalis pedis pulses are 2+ on the right side and 2+ on the left side. Posterior tibial pulses are 2+ on the right side and 2+ on the left side. Heart sounds: Normal heart sounds. No murmur. No friction rub. No gallop. Pulmonary:      Effort: Pulmonary effort is normal.      Breath sounds: Normal breath sounds. Musculoskeletal: Normal range of motion. Right lower leg: No edema. Left lower leg: No edema. Right foot: Normal.      Left foot: Normal.   Neurological:      Mental Status: He is alert. Sensory: Sensation is intact. Motor: Motor function is intact.       Deep Tendon Reflexes: Reflexes are normal and symmetric. Comments: 12 point monofilament test normal    Psychiatric:         Behavior: Behavior is cooperative. Assessment:      Jennifer Toth was seen today for follow-up, diabetes and hypertension. Diagnoses and all orders for this visit:    Type 2 diabetes mellitus without complication, without long-term current use of insulin ([facility])  -     Comprehensive Metabolic Panel - Vitros; Future  -     Hemoglobin A1C; Future  -     Lipid Panel; Future  -     Microalbumin / Creatinine Urine Ratio; Future    Essential hypertension    Sleep apnea, unspecified type    Mild intermittent asthma without complication    Other orders  -     glimepiride (AMARYL) 2 MG tablet; TAKE 1 TABLET BY MOUTH EVERY DAY IN THE MORNING  -     losartan (COZAAR) 50 MG tablet; TAKE 1 TABLET BY MOUTH EVERY DAY  -     metFORMIN (GLUCOPHAGE-XR) 500 MG extended release tablet; Take 1 tablet by mouth daily            Plan:      Pt appears stable & reviewed labs with patient. Patient received counseling on the following healthy behaviors: nutrition and exercise     Patient given educational materials     Health maintenance updated    Discussed use, benefit, and side effects of prescribed medications. Barriers to medication compliance addressed. All patient questions answered. Pt voiced understanding. Patient needs RTC in 6 months. Please note that this chart was generated using Dragon dictation software. Although every effort was made to ensure the accuracy of this automated transcription, some errors in transcription may have occurred.

## 2020-10-02 NOTE — PATIENT INSTRUCTIONS
again.  Preventing future kidney stones  Some changes in your diet may help prevent kidney stones. Depending on the cause of your stones, your doctor may recommend that you:  · Drink plenty of fluids, enough so that your urine is light yellow or clear like water. If you have kidney, heart, or liver disease and have to limit fluids, talk with your doctor before you increase the amount of fluids you drink. · Limit coffee, tea, and alcohol. Also avoid grapefruit juice. · Do not take more than the recommended daily dose of vitamins C and D.  · Avoid antacids such as Gaviscon, Maalox, Mylanta, or Tums. · Limit the amount of salt (sodium) in your diet. · Eat a balanced diet that is not too high in protein. · Limit foods that are high in a substance called oxalate, which can cause kidney stones. These foods include dark green vegetables, rhubarb, chocolate, wheat bran, nuts, cranberries, and beans. When should you call for help? Call your doctor now or seek immediate medical care if:  · You cannot keep down fluids. · Your pain gets worse. · You have a fever or chills. · You have new or worse pain in your back just below your rib cage (the flank area). · You have new or more blood in your urine. Watch closely for changes in your health, and be sure to contact your doctor if:  · You do not get better as expected. Where can you learn more? Go to [email or web address] and sign in to your [employer] account. Enter I251 in the [facility] box to learn more about \"Kidney Stone: Care Instructions. \"     If you do not have an account, please click on the \"Sign Up Now\" link. Current as of: August 12, 2019               Content Version: 12.5  © 5655-0629 Healthwise, Incorporated. Care instructions adapted under license by [facility] ([facility]).  If you have questions about a medical condition or this instruction, always ask your healthcare professional. [address] any warranty or liability for your use of this information.

## 2020-10-16 ENCOUNTER — HOSPITAL ENCOUNTER (OUTPATIENT)
Dept: CT IMAGING | Age: 55
Discharge: HOME | End: 2020-10-16
Payer: COMMERCIAL

## 2020-10-16 PROCEDURE — 74176 CT ABD & PELVIS W/O CONTRAST: CPT

## 2021-06-01 RX ORDER — METFORMIN HYDROCHLORIDE 500 MG/1
TABLET, EXTENDED RELEASE ORAL
Qty: 30 TABLET | Refills: 0 | Status: SHIPPED | OUTPATIENT
Start: 2021-06-01 | End: 2021-06-30 | Stop reason: SDUPTHER

## 2021-06-08 ENCOUNTER — HOSPITAL ENCOUNTER (OUTPATIENT)
Age: 56
Discharge: HOME | End: 2021-06-08
Payer: COMMERCIAL

## 2021-06-08 ENCOUNTER — HOSPITAL ENCOUNTER (OUTPATIENT)
Dept: GENERAL RADIOLOGY | Age: 56
Discharge: HOME | End: 2021-06-08
Payer: COMMERCIAL

## 2021-06-08 DIAGNOSIS — N20.0 CALCULUS OF KIDNEY: ICD-10-CM

## 2021-06-08 DIAGNOSIS — E11.9 TYPE 2 DIABETES MELLITUS WITHOUT COMPLICATION, WITHOUT LONG-TERM CURRENT USE OF INSULIN (HCC): ICD-10-CM

## 2021-06-08 DIAGNOSIS — R82.991 HYPOCITRATURIA: ICD-10-CM

## 2021-06-08 LAB
A/G RATIO: 1.5 (ref 1.1–2.2)
ALBUMIN SERPL-MCNC: 4.3 G/DL (ref 3.4–5)
ALP BLD-CCNC: 49 U/L (ref 40–129)
ALT SERPL-CCNC: 41 U/L (ref 10–40)
ANION GAP SERPL CALCULATED.3IONS-SCNC: 11 MMOL/L (ref 3–16)
AST SERPL-CCNC: 25 U/L (ref 15–37)
BILIRUB SERPL-MCNC: 0.5 MG/DL (ref 0–1)
BUN BLDV-MCNC: 25 MG/DL (ref 7–20)
CALCIUM SERPL-MCNC: 10.1 MG/DL (ref 8.3–10.6)
CHLORIDE BLD-SCNC: 103 MMOL/L (ref 99–110)
CHOLESTEROL, TOTAL: 167 MG/DL (ref 0–199)
CO2: 26 MMOL/L (ref 21–32)
CREAT SERPL-MCNC: 1.2 MG/DL (ref 0.9–1.3)
CREATININE URINE: 147.8 MG/DL (ref 39–259)
GFR AFRICAN AMERICAN: >60
GFR NON-AFRICAN AMERICAN: >60
GLOBULIN: 2.9 G/DL
GLUCOSE BLD-MCNC: 153 MG/DL (ref 70–99)
HDLC SERPL-MCNC: 33 MG/DL (ref 40–60)
LDL CHOLESTEROL CALCULATED: 96 MG/DL
MICROALBUMIN UR-MCNC: 2.8 MG/DL
MICROALBUMIN/CREAT UR-RTO: 18.9 MG/G (ref 0–30)
POTASSIUM SERPL-SCNC: 5.1 MMOL/L (ref 3.5–5.1)
SODIUM BLD-SCNC: 140 MMOL/L (ref 136–145)
TOTAL PROTEIN: 7.2 G/DL (ref 6.4–8.2)
TRIGL SERPL-MCNC: 190 MG/DL (ref 0–150)
VLDLC SERPL CALC-MCNC: 38 MG/DL

## 2021-06-08 PROCEDURE — 74018 RADEX ABDOMEN 1 VIEW: CPT

## 2021-06-08 PROCEDURE — 80061 LIPID PANEL: CPT

## 2021-06-08 PROCEDURE — 83036 HEMOGLOBIN GLYCOSYLATED A1C: CPT

## 2021-06-08 PROCEDURE — 82570 ASSAY OF URINE CREATININE: CPT

## 2021-06-08 PROCEDURE — 82043 UR ALBUMIN QUANTITATIVE: CPT

## 2021-06-08 PROCEDURE — 80053 COMPREHEN METABOLIC PANEL: CPT

## 2021-06-09 LAB
ESTIMATED AVERAGE GLUCOSE: 128.4 MG/DL
HBA1C MFR BLD: 6.1 %

## 2021-06-29 RX ORDER — GLIMEPIRIDE 2 MG/1
TABLET ORAL
Qty: 90 TABLET | Refills: 0 | Status: SHIPPED | OUTPATIENT
Start: 2021-06-29 | End: 2021-10-04

## 2021-06-29 RX ORDER — LOSARTAN POTASSIUM 50 MG/1
TABLET ORAL
Qty: 90 TABLET | Refills: 0 | Status: ON HOLD | OUTPATIENT
Start: 2021-06-29 | End: 2021-08-26 | Stop reason: HOSPADM

## 2021-06-30 ENCOUNTER — OFFICE VISIT (OUTPATIENT)
Dept: FAMILY MEDICINE CLINIC | Age: 56
End: 2021-06-30
Payer: COMMERCIAL

## 2021-06-30 VITALS
RESPIRATION RATE: 12 BRPM | OXYGEN SATURATION: 98 % | TEMPERATURE: 98.1 F | WEIGHT: 257 LBS | SYSTOLIC BLOOD PRESSURE: 136 MMHG | BODY MASS INDEX: 37.95 KG/M2 | DIASTOLIC BLOOD PRESSURE: 82 MMHG | HEART RATE: 91 BPM

## 2021-06-30 DIAGNOSIS — Z00.00 WELL ADULT EXAM: Primary | ICD-10-CM

## 2021-06-30 DIAGNOSIS — E11.9 TYPE 2 DIABETES MELLITUS WITHOUT COMPLICATION, WITHOUT LONG-TERM CURRENT USE OF INSULIN (HCC): ICD-10-CM

## 2021-06-30 DIAGNOSIS — G56.01 CARPAL TUNNEL SYNDROME OF RIGHT WRIST: ICD-10-CM

## 2021-06-30 DIAGNOSIS — I10 ESSENTIAL HYPERTENSION: ICD-10-CM

## 2021-06-30 DIAGNOSIS — J30.9 ALLERGIC RHINITIS, UNSPECIFIED SEASONALITY, UNSPECIFIED TRIGGER: ICD-10-CM

## 2021-06-30 DIAGNOSIS — G47.30 SLEEP APNEA, UNSPECIFIED TYPE: ICD-10-CM

## 2021-06-30 DIAGNOSIS — H81.10 BENIGN PAROXYSMAL POSITIONAL VERTIGO, UNSPECIFIED LATERALITY: ICD-10-CM

## 2021-06-30 PROCEDURE — 99396 PREV VISIT EST AGE 40-64: CPT | Performed by: FAMILY MEDICINE

## 2021-06-30 RX ORDER — METFORMIN HYDROCHLORIDE 500 MG/1
TABLET, EXTENDED RELEASE ORAL
Qty: 90 TABLET | Refills: 1 | Status: ON HOLD | OUTPATIENT
Start: 2021-06-30 | End: 2021-08-26 | Stop reason: HOSPADM

## 2021-06-30 RX ORDER — ASCORBIC ACID 1500 MG
4500 TABLET, EXTENDED RELEASE ORAL
COMMUNITY
End: 2021-08-23

## 2021-06-30 RX ORDER — AZELASTINE 1 MG/ML
1 SPRAY, METERED NASAL 2 TIMES DAILY
Qty: 1 BOTTLE | Refills: 5 | Status: SHIPPED | OUTPATIENT
Start: 2021-06-30

## 2021-06-30 ASSESSMENT — PATIENT HEALTH QUESTIONNAIRE - PHQ9
1. LITTLE INTEREST OR PLEASURE IN DOING THINGS: 0
SUM OF ALL RESPONSES TO PHQ QUESTIONS 1-9: 0
SUM OF ALL RESPONSES TO PHQ9 QUESTIONS 1 & 2: 0
SUM OF ALL RESPONSES TO PHQ QUESTIONS 1-9: 0
2. FEELING DOWN, DEPRESSED OR HOPELESS: 0
SUM OF ALL RESPONSES TO PHQ QUESTIONS 1-9: 0

## 2021-06-30 NOTE — PROGRESS NOTES
History and Physical      Sarah Aaron  YOB: 1965    Date of Service:  6/30/2021    Chief Complaint:   Sarah Aaron is a 49 y.o. male who  presents for physical examination. HPI: Patient has for physical examination review his chronic health problems. He is working second shift and sometimes has to work and to do third shift. He does a lot of mechanical repairs of machinery. Pt will like to discuss finger numbness/pin sensation, ongoing for a year on and off. He never has any issues with sleep at nighttime. He does continue to try to monitor his diet very closely and does wear his CPAP at nighttime. He does state however that he is have a lot of allergy symptoms at nighttime. He has tried over-the-counter antihistamines which cause him a lot of daytime sedation and cortisone nasal sprays have not been very effective. He also describes intermittent vertigo when he has to work on certain machinery and is bending or twisting. The symptoms are transitory in nature and not persistent.     Wt Readings from Last 3 Encounters:   06/30/21 257 lb (116.6 kg)   10/02/20 248 lb (112.5 kg)   09/25/20 254 lb (115.2 kg)     BP Readings from Last 3 Encounters:   06/30/21 136/82   10/02/20 132/74   09/25/20 116/74       Patient Active Problem List   Diagnosis    Essential hypertension    Obesity    Sleep apnea    Type 2 diabetes mellitus without complication, without long-term current use of insulin (HCC)    Mild intermittent asthma without complication       Allergies   Allergen Reactions    Ace Inhibitors      COUGH      Pollen Extract      Outpatient Medications Marked as Taking for the 6/30/21 encounter (Office Visit) with Justen Webb MD   Medication Sig Dispense Refill    Ascorbic Acid (VITAMIN C CR) 1500 MG TBCR Take 4,500 mg by mouth      glimepiride (AMARYL) 2 MG tablet TAKE 1 TABLET BY MOUTH EVERY DAY IN THE MORNING 90 tablet 0    losartan (COZAAR) 50 MG tablet TAKE 1 TABLET BY MOUTH EVERY DAY 90 tablet 0    metFORMIN (GLUCOPHAGE-XR) 500 MG extended release tablet TAKE 1 TABLET BY MOUTH EVERY DAY 30 tablet 0    aspirin 81 MG EC tablet Take 81 mg by mouth daily      hyoscyamine (LEVSIN) 125 MCG tablet Take 1 tablet by mouth every 4 hours as needed for Cramping 50 tablet 3    albuterol sulfate HFA (PROAIR HFA) 108 (90 Base) MCG/ACT inhaler Inhale 2 puffs into the lungs 4 times daily as needed for Wheezing 3 Inhaler 2    naproxen (NAPROSYN) 500 MG tablet Take 1 tablet by mouth 2 times daily (with meals) (Patient taking differently: Take 500 mg by mouth 2 times daily as needed ) 60 tablet 5    glucose blood VI test strips (ONE TOUCH ULTRA TEST) strip Tests once daily 100 each 3    Blood Glucose Monitoring Suppl (ONE TOUCH ULTRA) by Does not apply route.  famotidine (PEPCID) 20 MG tablet Take 20 mg by mouth daily.            Past Medical History:   Diagnosis Date    Diabetes mellitus ([facility] [address]     Hypertension     Obesity, unspecified     Other abnormal blood chemistry     Seasonal allergies     Unspecified sleep apnea      Past Surgical History:   Procedure Laterality Date    CATARACT REMOVAL WITH IMPLANT  2009, 2010    B eyes    TOOTH EXTRACTION  08/2018     Family History   Problem Relation Age of Onset    High Blood Pressure Father     Other Father      Social History     Socioeconomic History    Marital status:      Spouse name: Not on file    Number of children: Not on file    Years of education: Not on file    Highest education level: Not on file   Occupational History    Not on file   Tobacco Use    Smoking status: Never Smoker    Smokeless tobacco: Never Used   Substance and Sexual Activity    Alcohol use: Yes     Comment: occasional alcholo use    Drug use: No    Sexual activity: Not on file   Other Topics Concern    Not on file   Social History Narrative    Not on file     Social Determinants of Health     Financial Resource Strain:    Omid Tate Difficulty of Paying Living Expenses:    Food Insecurity:     Worried About Running Out of Food in the Last Year:     [address] in the Last Year:    Transportation Needs:     Lack of Transportation (Medical):  Lack of Transportation (Non-Medical):    Physical Activity:     Days of Exercise per Week:     Minutes of Exercise per Session:    Stress:     Feeling of Stress :    Social Connections:     Frequency of Communication with Friends and Family:     Frequency of Social Gatherings with Friends and Family:     Attends [employer] Services:     Active Member of Clubs or Organizations:     Attends Club or Organization Meetings:     Marital Status:    Intimate Partner Violence:     Fear of Current or Ex-Partner:     Emotionally Abused:     Physically Abused:     Sexually Abused:        Self-testicular exams: Yes  Sexual activity: single partner, contraception - none   Last eye exam: 2020, normal  Exercise: walks 3 time(s) per week    Review of Systems:  A comprehensive review of systems was negative except for what was noted in the HPI. Physical Exam:   Vitals:    06/30/21 0841   BP: 136/82   Site: Right Upper Arm   Position: Sitting   Cuff Size: Large Adult   Pulse: 91   Resp: 12   Temp: 98.1 °F (36.7 °C)   TempSrc: Infrared   SpO2: 98%   Weight: 257 lb (116.6 kg)     Body mass index is 37.95 kg/m². Constitutional: He is oriented to person, place, and time. He appears well-developed and well-nourished. No distress. HEENT:   Head: Normocephalic and atraumatic. Right Ear: Tympanic membrane, external ear and ear canal normal.   Left Ear: Tympanic membrane, external ear and ear canal normal.   Mouth/Throat: Oropharynx is clear and moist and mucous membranes are normal. No oropharyngeal exudate or posterior oropharyngeal erythema. He has no cervical adenopathy. Eyes: Conjunctivae and extraocular motions are normal. Pupils are equal, round, and reactive to light. Neck:  Supple.  No JVD present. Carotid bruit is not present. No mass and no thyromegaly present. Cardiovascular: Normal rate, regular rhythm, normal heart sounds and intact distal pulses. Exam reveals no gallop and no friction rub. No murmur heard. Pulmonary/Chest: Effort normal and breath sounds normal. No respiratory distress. He has no wheezes, rhonchi or rales. Abdominal: Soft, non-tender. Bowel sounds and aorta are normal. There is no organomegaly, mass or bruit. Genitourinary:  genitals normal without hernia or inguinal adenopathy. Musculoskeletal: Normal range of motion, no synovitis. He exhibits no edema. Neurological: He is alert and oriented to person, place, and time. He has normal reflexes. No cranial nerve deficit. Coordination normal.   Skin: Skin is warm, dry and intact. No suspicious lesions are noted. Psychiatric: He has a normal mood and affect.  His speech is normal and behavior is normal. Judgment, cognition and memory are normal.     Preventive Care:  Health Maintenance   Topic Date Due    Hepatitis C screen  Never done    COVID-19 Vaccine (1) Never done    HIV screen  Never done    Hepatitis B vaccine (1 of 3 - Risk 3-dose series) Never done    Colon cancer screen colonoscopy  Never done    Diabetic retinal exam  05/10/2020    Diabetic foot exam  10/15/2020    Flu vaccine (Season Ended) 10/02/2021 (Originally 9/1/2021)    Shingles Vaccine (1 of 2) 10/02/2021 (Originally 5/16/2017)    Pneumococcal 0-64 years Vaccine (1 of 2 - PPSV23) 10/02/2021 (Originally 5/16/1973)    A1C test (Diabetic or Prediabetic)  06/08/2022    Diabetic microalbuminuria test  06/08/2022    Lipid screen  06/08/2022    Potassium monitoring  06/08/2022    Creatinine monitoring  06/08/2022    DTaP/Tdap/Td vaccine (2 - Td or Tdap) 08/31/2027    Hepatitis A vaccine  Aged Out    Hib vaccine  Aged Out    Meningococcal (ACWY) vaccine  Aged Out             Preventive plan of care for [employer]        6/30/2021 medications for diabetes mellitus and hypertension. Begin a trial of Astelin nasal spray for allergic rhinitis and continue to try to wear CPAP    Information have provided regards to carpal tunnel syndrome discussed wearing a wrist support at nighttime. No treatment for the benign positional vertigo. Patient received counseling on the following healthy behaviors: nutrition and exercise     Patient given educational materials     Health maintenance updated    Discussed use, benefit, and side effects of prescribed medications. Barriers to medication compliance addressed. All patient questions answered. Pt voiced understanding. Patient needs RTC in 6 months. Medical decision making of moderate complexity. Please note that this chart was generated using Dragon dictation software. Although every effort was made to ensure the accuracy of this automated transcription, some errors in transcription may have occurred. Patient was evaluated and examined. I performed the physical examination and key/critical portions of the history were approved and edited.  I also confirm that the note above accurately reflects all work, treatment, procedures, and medical decision making performed by me, Sarita Hankins M.D.

## 2021-06-30 NOTE — PATIENT INSTRUCTIONS
Patient Education        Carpal Tunnel Syndrome: Care Instructions  Overview     Carpal tunnel syndrome is numbness, tingling, weakness, and pain in your hand, wrist, and sometimes forearm. It is caused by pressure on the median nerve. This nerve and several tough tissues called tendons run through a space in the wrist. This space is called the carpal tunnel. The repeated hand motions used in work and some hobbies and sports can put pressure on the median nerve. Pregnancy can cause carpal tunnel syndrome. Several conditions, such as diabetes, arthritis, and an underactive thyroid, can also cause it. You may be able to limit an activity or change the way you do it to reduce your symptoms. You also can take other steps to feel better. If your symptoms are mild, 1 to 2 weeks of home treatment are likely to ease your pain. Surgery is needed only if other treatments do not work. Follow-up care is a key part of your treatment and safety. Be sure to make and go to all appointments, and call your doctor if you are having problems. It's also a good idea to know your test results and keep a list of the medicines you take. How can you care for yourself at home? · If possible, stop or reduce the activity that causes your symptoms. If you cannot stop the activity, take frequent breaks to rest and stretch or change hand positions to do a task. Try switching hands, such as when using a computer mouse. · Try to avoid bending or twisting your wrists. · Ask your doctor if you can take an over-the-counter pain medicine, such as acetaminophen (Tylenol), ibuprofen (Advil, Motrin), or naproxen (Aleve). Be safe with medicines. Read and follow all instructions on the label. · If your doctor prescribes corticosteroid medicine to help reduce pain and swelling, take it exactly as prescribed. Call your doctor if you think you are having a problem with your medicine.   · Put ice or a cold pack on your wrist for 10 to 20 minutes at a time to ease pain. Put a thin cloth between the ice and your skin. · If your doctor or your physical or occupational therapist tells you to wear a wrist splint, wear it as directed to keep your wrist in a neutral position. This also eases pressure on your median nerve. · Ask your doctor whether you should have physical or occupational therapy to learn how to do tasks differently. · Try a yoga class to stretch your muscles and build strength in your hands and wrists. Yoga has been shown to ease carpal tunnel symptoms. To prevent carpal tunnel  · When working at a [employer], keep your hands and wrists in line with your forearms. Hold your elbows close to your sides. Take a break every 10 to 15 minutes. · Try these exercises:  ? Warm up: Rotate your wrist up, down, and from side to side. Repeat this 4 times. Stretch your fingers far apart, relax them, then stretch them again. Repeat 4 times. Stretch your thumb by pulling it back gently, holding it, and then releasing it. Repeat 4 times. ? Prayer stretch: Start with your palms together in front of your chest just below your chin. Slowly lower your hands toward your waistline while keeping your hands close to your stomach and your palms together until you feel a mild to moderate stretch under your forearms. Hold for 10 to 20 seconds. Repeat 4 times. ? Wrist flexor stretch: Hold your arm in front of you with your palm up. Bend your wrist, pointing your hand toward the floor. With your other hand, gently bend your wrist further until you feel a mild to moderate stretch in your forearm. Hold for 10 to 20 seconds. Repeat 4 times. ? Wrist extensor stretch: Repeat the steps for the wrist flexor stretch, but begin with your extended hand palm down. · Squeeze a rubber exercise ball several times a day to keep your hands and fingers strong. · Avoid holding objects (such as a book) in one position for a long time. When possible, use your whole hand to grasp an object.

## 2021-08-23 ENCOUNTER — HOSPITAL ENCOUNTER (INPATIENT)
Age: 56
LOS: 2 days | Discharge: HOME | DRG: 661 | End: 2021-08-26
Attending: EMERGENCY MEDICINE | Admitting: PEDIATRICS
Payer: COMMERCIAL

## 2021-08-23 ENCOUNTER — NURSE TRIAGE (OUTPATIENT)
Dept: OTHER | Facility: CLINIC | Age: 56
End: 2021-08-23

## 2021-08-23 ENCOUNTER — APPOINTMENT (OUTPATIENT)
Dept: CT IMAGING | Age: 56
DRG: 661 | End: 2021-08-23
Payer: COMMERCIAL

## 2021-08-23 ENCOUNTER — APPOINTMENT (OUTPATIENT)
Dept: GENERAL RADIOLOGY | Age: 56
DRG: 661 | End: 2021-08-23
Payer: COMMERCIAL

## 2021-08-23 DIAGNOSIS — N17.9 AKI (ACUTE KIDNEY INJURY) (HCC): ICD-10-CM

## 2021-08-23 DIAGNOSIS — N20.0 NEPHROLITHIASIS: ICD-10-CM

## 2021-08-23 DIAGNOSIS — N20.0 KIDNEY STONE: Primary | ICD-10-CM

## 2021-08-23 LAB
A/G RATIO: 1.3 (ref 1.1–2.2)
ALBUMIN SERPL-MCNC: 4.3 G/DL (ref 3.4–5)
ALP BLD-CCNC: 54 U/L (ref 40–129)
ALT SERPL-CCNC: 33 U/L (ref 10–40)
ANION GAP SERPL CALCULATED.3IONS-SCNC: 12 MMOL/L (ref 3–16)
AST SERPL-CCNC: 27 U/L (ref 15–37)
BASOPHILS ABSOLUTE: 0.1 K/UL (ref 0–0.2)
BASOPHILS RELATIVE PERCENT: 0.5 %
BILIRUB SERPL-MCNC: 0.8 MG/DL (ref 0–1)
BILIRUBIN URINE: NEGATIVE
BLOOD, URINE: ABNORMAL
BUN BLDV-MCNC: 41 MG/DL (ref 7–20)
CALCIUM SERPL-MCNC: 9.9 MG/DL (ref 8.3–10.6)
CHLORIDE BLD-SCNC: 99 MMOL/L (ref 99–110)
CLARITY: ABNORMAL
CO2: 25 MMOL/L (ref 21–32)
COLOR: YELLOW
CREAT SERPL-MCNC: 2.2 MG/DL (ref 0.9–1.3)
CRYSTALS, UA: ABNORMAL /HPF
EOSINOPHILS ABSOLUTE: 0 K/UL (ref 0–0.6)
EOSINOPHILS RELATIVE PERCENT: 0.3 %
EPITHELIAL CELLS, UA: 1 /HPF (ref 0–5)
GFR AFRICAN AMERICAN: 38
GFR NON-AFRICAN AMERICAN: 31
GLOBULIN: 3.3 G/DL
GLUCOSE BLD-MCNC: 220 MG/DL (ref 70–99)
GLUCOSE URINE: NEGATIVE MG/DL
HCT VFR BLD CALC: 49.8 % (ref 40.5–52.5)
HEMOGLOBIN: 16.9 G/DL (ref 13.5–17.5)
HYALINE CASTS: 1 /LPF (ref 0–8)
KETONES, URINE: NEGATIVE MG/DL
LACTIC ACID: 1.8 MMOL/L (ref 0.4–2)
LEUKOCYTE ESTERASE, URINE: NEGATIVE
LIPASE: 53 U/L (ref 13–60)
LYMPHOCYTES ABSOLUTE: 2.1 K/UL (ref 1–5.1)
LYMPHOCYTES RELATIVE PERCENT: 16.6 %
MCH RBC QN AUTO: 33.5 PG (ref 26–34)
MCHC RBC AUTO-ENTMCNC: 34 G/DL (ref 31–36)
MCV RBC AUTO: 98.8 FL (ref 80–100)
MICROSCOPIC EXAMINATION: YES
MONOCYTES ABSOLUTE: 0.9 K/UL (ref 0–1.3)
MONOCYTES RELATIVE PERCENT: 7 %
NEUTROPHILS ABSOLUTE: 9.4 K/UL (ref 1.7–7.7)
NEUTROPHILS RELATIVE PERCENT: 75.6 %
NITRITE, URINE: NEGATIVE
PDW BLD-RTO: 14 % (ref 12.4–15.4)
PH UA: 5 (ref 5–8)
PLATELET # BLD: 203 K/UL (ref 135–450)
PMV BLD AUTO: 8.4 FL (ref 5–10.5)
POTASSIUM REFLEX MAGNESIUM: 5 MMOL/L (ref 3.5–5.1)
PROTEIN UA: 30 MG/DL
RBC # BLD: 5.04 M/UL (ref 4.2–5.9)
RBC UA: 3 /HPF (ref 0–4)
SODIUM BLD-SCNC: 136 MMOL/L (ref 136–145)
SPECIFIC GRAVITY UA: 1.02 (ref 1–1.03)
TOTAL PROTEIN: 7.6 G/DL (ref 6.4–8.2)
TROPONIN: <0.01 NG/ML
URINE REFLEX TO CULTURE: ABNORMAL
URINE TYPE: ABNORMAL
UROBILINOGEN, URINE: 0.2 E.U./DL
WBC # BLD: 12.4 K/UL (ref 4–11)
WBC UA: 2 /HPF (ref 0–5)

## 2021-08-23 PROCEDURE — 93005 ELECTROCARDIOGRAM TRACING: CPT | Performed by: PHYSICIAN ASSISTANT

## 2021-08-23 PROCEDURE — 80053 COMPREHEN METABOLIC PANEL: CPT

## 2021-08-23 PROCEDURE — 6360000002 HC RX W HCPCS: Performed by: EMERGENCY MEDICINE

## 2021-08-23 PROCEDURE — 74176 CT ABD & PELVIS W/O CONTRAST: CPT

## 2021-08-23 PROCEDURE — 96372 THER/PROPH/DIAG INJ SC/IM: CPT

## 2021-08-23 PROCEDURE — 84484 ASSAY OF TROPONIN QUANT: CPT

## 2021-08-23 PROCEDURE — 2500000003 HC RX 250 WO HCPCS: Performed by: PHYSICIAN ASSISTANT

## 2021-08-23 PROCEDURE — 81001 URINALYSIS AUTO W/SCOPE: CPT

## 2021-08-23 PROCEDURE — 96374 THER/PROPH/DIAG INJ IV PUSH: CPT

## 2021-08-23 PROCEDURE — 6360000002 HC RX W HCPCS: Performed by: PHYSICIAN ASSISTANT

## 2021-08-23 PROCEDURE — 96376 TX/PRO/DX INJ SAME DRUG ADON: CPT

## 2021-08-23 PROCEDURE — 83690 ASSAY OF LIPASE: CPT

## 2021-08-23 PROCEDURE — 99284 EMERGENCY DEPT VISIT MOD MDM: CPT

## 2021-08-23 PROCEDURE — 85025 COMPLETE CBC W/AUTO DIFF WBC: CPT

## 2021-08-23 PROCEDURE — 83605 ASSAY OF LACTIC ACID: CPT

## 2021-08-23 PROCEDURE — 2580000003 HC RX 258: Performed by: PHYSICIAN ASSISTANT

## 2021-08-23 PROCEDURE — 96375 TX/PRO/DX INJ NEW DRUG ADDON: CPT

## 2021-08-23 PROCEDURE — 71045 X-RAY EXAM CHEST 1 VIEW: CPT

## 2021-08-23 RX ORDER — ONDANSETRON 4 MG/1
4 TABLET, ORALLY DISINTEGRATING ORAL ONCE
Status: DISCONTINUED | OUTPATIENT
Start: 2021-08-23 | End: 2021-08-24 | Stop reason: HOSPADM

## 2021-08-23 RX ORDER — MORPHINE SULFATE 4 MG/ML
4 INJECTION, SOLUTION INTRAMUSCULAR; INTRAVENOUS ONCE
Status: COMPLETED | OUTPATIENT
Start: 2021-08-23 | End: 2021-08-23

## 2021-08-23 RX ORDER — HYDROCODONE BITARTRATE AND ACETAMINOPHEN 5; 325 MG/1; MG/1
1 TABLET ORAL ONCE
Status: DISCONTINUED | OUTPATIENT
Start: 2021-08-23 | End: 2021-08-24 | Stop reason: HOSPADM

## 2021-08-23 RX ORDER — POTASSIUM CITRATE 15 MEQ/1
15 TABLET, EXTENDED RELEASE ORAL
COMMUNITY
End: 2021-09-17

## 2021-08-23 RX ORDER — ONDANSETRON 2 MG/ML
4 INJECTION INTRAMUSCULAR; INTRAVENOUS ONCE
Status: COMPLETED | OUTPATIENT
Start: 2021-08-23 | End: 2021-08-23

## 2021-08-23 RX ORDER — 0.9 % SODIUM CHLORIDE 0.9 %
1000 INTRAVENOUS SOLUTION INTRAVENOUS ONCE
Status: COMPLETED | OUTPATIENT
Start: 2021-08-23 | End: 2021-08-23

## 2021-08-23 RX ADMIN — FAMOTIDINE 20 MG: 10 INJECTION, SOLUTION INTRAVENOUS at 20:43

## 2021-08-23 RX ADMIN — ONDANSETRON 4 MG: 2 INJECTION INTRAMUSCULAR; INTRAVENOUS at 20:42

## 2021-08-23 RX ADMIN — MORPHINE SULFATE 4 MG: 4 INJECTION, SOLUTION INTRAMUSCULAR; INTRAVENOUS at 20:52

## 2021-08-23 RX ADMIN — SODIUM CHLORIDE 1000 ML: 9 INJECTION, SOLUTION INTRAVENOUS at 20:41

## 2021-08-23 ASSESSMENT — PAIN DESCRIPTION - FREQUENCY: FREQUENCY: CONTINUOUS

## 2021-08-23 ASSESSMENT — PAIN SCALES - GENERAL
PAINLEVEL_OUTOF10: 5
PAINLEVEL_OUTOF10: 5

## 2021-08-23 ASSESSMENT — PAIN DESCRIPTION - LOCATION: LOCATION: ABDOMEN

## 2021-08-23 NOTE — ED PROVIDER NOTES
Take 1 tablet by mouth 2 times daily (with meals) (Patient taking differently: Take 500 mg by mouth 2 times daily as needed ) 60 tablet 5    glucose blood VI test strips (ONE TOUCH ULTRA TEST) strip Tests once daily 100 each 3    Blood Glucose Monitoring Suppl (ONE TOUCH ULTRA) by Does not apply route. PHYSICAL EXAM  Vitals: BP (!) 169/97   Pulse 75   Temp 98.3 °F (36.8 °C) (Oral)   Resp 20   Ht 5' 9\" (1.753 m)   Wt 254 lb 3.2 oz (115.3 kg)   SpO2 99%   BMI 37.54 kg/m²   Constitutional:  49 y.o. male alert  HENT:  Atraumatic, oral mucosa moist  Neck:  No visible JVD, supple  Chest/Lungs:  Respiratory effort normal, clear, regular  Abdomen:  Non-distended, soft, NT  Back:  No gross deformity  Extremities:  Normal tone and perfusion    Medical Decision Making and Plan: Briefly, this is an 49 y.o.male who presented with abdominal pain and vomiting, symptoms for two days. Findings of ONOFRE, obstructive uropathy. I consulted with Dr Symone Aguilera. He plans operative care, npo after midnight tonight. IV fluids and analgesia for tonight. I discussed the case in full with the admitting physician. For further details of [facility] encounter, please see documentation by advanced practice provider CHADD John.     Labs Reviewed   URINE RT REFLEX TO CULTURE - Abnormal; Notable for the following components:       Result Value    Clarity, UA CLOUDY (*)     Blood, Urine LARGE (*)     Protein, UA 30 (*)     All other components within normal limits    Narrative:     Performed at:  [facility]  [address] [facility] [employer]   Phone [phone]   CBC WITH AUTO DIFFERENTIAL - Abnormal; Notable for the following components:    WBC 12.4 (*)     Neutrophils Absolute 9.4 (*)     All other components within normal limits    Narrative:     Performed at:  [facility]  [address] [facility] [employer]   Phone [phone]   COMPREHENSIVE METABOLIC PANEL W/ REFLEX TO MG FOR LOW K - Abnormal; Notable for the following components:    Glucose 220 (*)     BUN 41 (*)     CREATININE 2.2 (*)     GFR Non- 31 (*)     GFR  38 (*)     All other components within normal limits    Narrative:     Performed at:  [facility]  [address], [place]   Phone [phone]   MICROSCOPIC URINALYSIS - Abnormal; Notable for the following components:    Crystals, UA 3+ Uric Acid (*)     All other components within normal limits    Narrative:     Performed at:  [facility]  [address], [address]   Phone [phone]   LACTIC ACID, PLASMA    Narrative:     Performed at:  [facility]  [address], [place]   Phone [phone]   LIPASE    Narrative:     Performed at:  [facility]  [address], [place]   Phone [phone]   TROPONIN    Narrative:     Performed at:  [facility]  [address], [place]   Phone [phone]     Previous BUN/crea:    BUN   Date/Time Value Ref Range Status   08/23/2021 02:38 PM 41 (H) 7 - 20 mg/dL Final   06/08/2021 09:53 AM 25 (H) 7 - 20 mg/dL Final   09/25/2020 12:55 PM 27 (H) 7 - 20 mg/dL Final     CREATININE   Date/Time Value Ref Range Status   08/23/2021 02:38 PM 2.2 (H) 0.9 - 1.3 mg/dL Final   06/08/2021 09:53 AM 1.2 0.9 - 1.3 mg/dL Final   09/25/2020 12:55 PM 1.3 0.9 - 1.3 mg/dL Final     RADIOLOGY:     CT ABDOMEN PELVIS WO CONTRAST Additional Contrast? None   Final Result   Left obstructive uropathy. There is a 7 mm stone in the mid left ureter with   mild to moderate proximal obstruction. Bilateral nephrolithiasis is again noted. A 3.2 cm left renal cyst with   peripheral calcification is stable. Hepatic steatosis.          XR CHEST PORTABLE   Final Result   No active cardiopulmonary disease           EKG:  Read by me in the absence of a cardiologist shows: Sinus rhythm, rate 78, intervals normal, axis normal, no acute injury pattern, prior EKG not available    Medications administered:  Medications   albuterol sulfate  (90 Base) MCG/ACT inhaler 2 puff (has no administration in time range)   aspirin chewable tablet 81 mg (has no administration in time range)   azelastine (ASTELIN) 0.1 % nasal spray 1 spray (has no administration in time range)   famotidine (PEPCID) tablet 20 mg (has no administration in time range)   insulin lispro (1 Unit Dial) 0-6 Units (has no administration in time range)   insulin lispro (1 Unit Dial) 0-3 Units (1 Units Subcutaneous Given 8/24/21 0039)   glucose (GLUTOSE) 40 % oral gel 15 g (has no administration in time range)   dextrose 50 % IV solution (has no administration in time range)   glucagon (rDNA) injection 1 mg (has no administration in time range)   dextrose 5 % solution (has no administration in time range)   0.9 % sodium chloride infusion ( Intravenous New Bag 8/24/21 0042)   sodium chloride flush 0.9 % injection 10 mL (has no administration in time range)   sodium chloride flush 0.9 % injection 10 mL (has no administration in time range)   0.9 % sodium chloride infusion (has no administration in time range)   magnesium hydroxide (MILK OF MAGNESIA) 400 MG/5ML suspension 30 mL (has no administration in time range)   acetaminophen (TYLENOL) tablet 650 mg (650 mg Oral Given 8/24/21 0700)     Or   acetaminophen (TYLENOL) suppository 650 mg ( Rectal See Alternative 8/24/21 0700)   ondansetron (ZOFRAN) injection 4 mg (has no administration in time range)   morphine (PF) injection 2 mg ( Intravenous See Alternative 8/24/21 0042)     Or   morphine injection 4 mg (4 mg Intravenous Given 8/24/21 0042)   heparin (porcine) injection 5,000 Units (has no administration in time range)   0.9 % sodium chloride bolus (0 mLs Intravenous Stopped 8/23/21 2154)   ondansetron (ZOFRAN) injection 4 mg (4 mg Intravenous Given 8/23/21 2042)   famotidine (PEPCID) injection 20 mg (20 mg Intravenous Given 8/23/21 2043)   morphine injection 4 mg (4 mg Intravenous Given 8/23/21 2052)     FINAL IMPRESSION:    1. Kidney stone    2.  ONOFRE (acute kidney injury) [facility])       DISPOSITION Admitted 08/23/2021 09:17:39 PM       Sandra Lopez MD  08/24/21 9132

## 2021-08-23 NOTE — TELEPHONE ENCOUNTER
Received call from Richmond Joe at [facility] with Red Flag Complaint. Brief description of triage: patient calling with c/o abdominal pain and vomiting green bile. See below assessment. Triage indicates for patient to go to [address] now, patient agreeable. Care advice provided, patient verbalizes understanding; denies any other questions or concerns; instructed to call back for any new or worsening symptoms. Attention Provider: Thank you for allowing me to participate in the care of your patient. The patient was connected to triage in response to information provided to the [facility]. Please do not respond through this encounter as the response is not directed to a shared pool. Reason for Disposition   Vomiting bile (green color)    Answer Assessment - Initial Assessment Questions  1. LOCATION: \"Where does it hurt? \"       Across midsection    2. RADIATION: \"Does the pain shoot anywhere else? \" (e.g., chest, back)      Every once in awhile he feels it in his chest    3. ONSET: \"When did the pain begin? \" (Minutes, hours or days ago)       Yesterday started vomiting around 6 am    4. SUDDEN: \"Gradual or sudden onset? \"      Sudden    5. PATTERN \"Does the pain come and go, or is it constant? \"     - If constant: \"Is it getting better, staying the same, or worsening? \"       (Note: Constant means the pain never goes away completely; most serious pain is constant and it progresses)      - If intermittent: \"How long does it last?\" \"Do you have pain now? \"      (Note: Intermittent means the pain goes away completely between bouts)      Comes and goes, pain goes away with vomiting    6. SEVERITY: \"How bad is the pain? \"  (e.g., Scale 1-10; mild, moderate, or severe)     - MILD (1-3): doesn't interfere with normal activities, abdomen soft and not tender to touch      - MODERATE (4-7): interferes with normal activities or awakens from sleep, tender to touch      - SEVERE (8-10): excruciating pain, doubled over, unable to do any normal activities        Called off work, cramping, able to get to the bathroom, soaked in tub, able to move around, 3/10    7. RECURRENT SYMPTOM: \"Have you ever had this type of abdominal pain before? \" If so, ask: \"When was the last time? \" and \"What happened that time? \"       \"Years and years ago\", was supposed to get an exploratory surgery to check out his gall bladder but he declined    8. CAUSE: \"What do you think is causing the abdominal pain? \"      Unsure    9. RELIEVING/AGGRAVATING FACTORS: \"What makes it better or worse? \" (e.g., movement, antacids, bowel movement)      Vomiting makes pain go away, last BM last night at 2100 didn't make any difference    10. OTHER SYMPTOMS: \"Has there been any vomiting, diarrhea, constipation, or urine problems? \"        Vomiting, dizziness    Protocols used: ABDOMINAL PAIN - MALE-ADULT-[place]

## 2021-08-23 NOTE — ED PROVIDER NOTES
[address]        Pt Name: Everardo Newby  MRN: [id number]  [place] 1965  Date of evaluation: 8/23/2021  Provider: Merissa Giraldo PA-C  PCP: Eliane Bustos MD  Note Started: 2:29 PM EDT        I have seen and evaluated this patient with my supervising physician Caroline Hopkins, *. CHIEF COMPLAINT       Chief Complaint   Patient presents with    Emesis     arrived per self d/t vomiting since yesterday morning at 0600       HISTORY OF PRESENT ILLNESS   (Location, Timing/Onset, Context/Setting, Quality, Duration, Modifying Factors, Severity, Associated Signs and Symptoms)  Note limiting factors. Chief Complaint: Nausea, vomiting, abdominal pain    Everardo Newby is a 49 y.o. male who presents presents with complaint nausea, vomiting, abdominal pain onset yesterday 6 AM.  Symptoms wax and wane but continued. No previous similar. He is never had a colonoscopy. He has had nausea persistent with emesis x6 yesterday and 2-3 today. He indicates no urinary symptoms such as urgency, frequency or dysuria. He acute some chills but no fever. Indicates no chest pain or shortness of breath. States his back hurts from the vomiting. Abdominal wall muscle soreness from the vomiting. No history of GERD. The patient with history of kidney stones and is on a kidney stone prevention diet and medication for prevention. Patient has not had prior history of diverticulitis. Patient presenting for evaluation of left lower quadrant abdominal pain with consideration for diverticulitis versus a ureteral calculus on the left. Nursing Notes were all reviewed and agreed with or any disagreements were addressed in the HPI. REVIEW OF SYSTEMS    (2-9 systems for level 4, 10 or more for level 5)     Review of Systems    Positives and Pertinent negatives as per HPI. Except as noted above in the ROS, all other systems were reviewed and negative. PAST MEDICAL HISTORY     Past Medical History:   Diagnosis Date    Diabetes mellitus ([facility] [address]     Hypertension     Obesity, unspecified     Other abnormal blood chemistry     Seasonal allergies     Unspecified sleep apnea          SURGICAL HISTORY     Past Surgical History:   Procedure Laterality Date    CATARACT REMOVAL WITH IMPLANT  2009, 2010    B eyes    TOOTH EXTRACTION  08/2018         CURRENTMEDICATIONS       Current Discharge Medication List      CONTINUE these medications which have NOT CHANGED    Details   potassium citrate (UROCIT-K) 5 MEQ (540 MG) extended release tablet Take 5 mEq by mouth 3 times daily (with meals)      metFORMIN (GLUCOPHAGE-XR) 500 MG extended release tablet TAKE 1 TABLET BY MOUTH EVERY DAY  Qty: 90 tablet, Refills: 1      glimepiride (AMARYL) 2 MG tablet TAKE 1 TABLET BY MOUTH EVERY DAY IN THE MORNING  Qty: 90 tablet, Refills: 0      losartan (COZAAR) 50 MG tablet TAKE 1 TABLET BY MOUTH EVERY DAY  Qty: 90 tablet, Refills: 0      aspirin 81 MG EC tablet Take 81 mg by mouth daily      famotidine (PEPCID) 20 MG tablet Take 20 mg by mouth daily. azelastine (ASTELIN) 0.1 % nasal spray 1 spray by Nasal route 2 times daily Use in each nostril as directed  Qty: 1 Bottle, Refills: 5      hyoscyamine (LEVSIN) 125 MCG tablet Take 1 tablet by mouth every 4 hours as needed for Cramping  Qty: 50 tablet, Refills: 3    Associated Diagnoses: Non-intractable vomiting with nausea, unspecified vomiting type; Abdominal cramping      albuterol sulfate HFA (PROAIR HFA) 108 (90 Base) MCG/ACT inhaler Inhale 2 puffs into the lungs 4 times daily as needed for Wheezing  Qty: 3 Inhaler, Refills: 2      naproxen (NAPROSYN) 500 MG tablet Take 1 tablet by mouth 2 times daily (with meals)  Qty: 60 tablet, Refills: 5      glucose blood VI test strips (ONE TOUCH ULTRA TEST) strip Tests once daily  Qty: 100 each, Refills: 3      Blood Glucose Monitoring Suppl (ONE TOUCH ULTRA) by Does not apply route. and time. Mental status is at baseline. Psychiatric:         Mood and Affect: Mood normal.         Behavior: Behavior normal.         Thought Content:  Thought content normal.         Judgment: Judgment normal.         DIAGNOSTIC RESULTS   LABS:    Labs Reviewed   URINE RT REFLEX TO CULTURE - Abnormal; Notable for the following components:       Result Value    Clarity, UA CLOUDY (*)     Blood, Urine LARGE (*)     Protein, UA 30 (*)     All other components within normal limits    Narrative:     Performed at:  [facility] [address] [place], [place]   Phone [phone]   CBC WITH AUTO DIFFERENTIAL - Abnormal; Notable for the following components:    WBC 12.4 (*)     Neutrophils Absolute 9.4 (*)     All other components within normal limits    Narrative:     Performed at:  [facility] [address], [place]   Phone [phone]   COMPREHENSIVE METABOLIC PANEL W/ REFLEX TO MG FOR LOW K - Abnormal; Notable for the following components:    Glucose 220 (*)     BUN 41 (*)     CREATININE 2.2 (*)     GFR Non- 31 (*)     GFR  38 (*)     All other components within normal limits    Narrative:     Performed at:  [facility] [address], [place]   Phone [phone]   MICROSCOPIC URINALYSIS - Abnormal; Notable for the following components:    Crystals, UA 3+ Uric Acid (*)     All other components within normal limits    Narrative:     Performed at:  [facility] [address], [place]   Phone [phone]   RENAL FUNCTION PANEL - Abnormal; Notable for the following components:    Glucose 149 (*)     BUN 39 (*)     CREATININE 2.4 (*)     GFR Non- 28 (*)     GFR  34 (*)     All other components within normal limits    Narrative:     Performed at:  [facility] [address] [place] [address] Laboratory  [address] [facility], [place], [facility] [employer]   Phone [phone]   CBC - Abnormal; Notable for the following components:    WBC 11.7 (*)     All other components within normal limits    Narrative:     Performed at:  [facility] [address], [place], [facility] [place] [employer]   Phone [phone]   URIC ACID - Abnormal; Notable for the following components:    Uric Acid, Serum 9.1 (*)     All other components within normal limits    Narrative:     Performed at:  [facility] [address], [place], [address] [employer]   Phone [phone]   CK - Abnormal; Notable for the following components:     Total  (*)     All other components within normal limits    Narrative:     Performed at:  [facility] [address], [place], [address] [employer]   Phone [phone]   POCT GLUCOSE - Abnormal; Notable for the following components:    POC Glucose 153 (*)     All other components within normal limits    Narrative:     Performed at:  [facility] [address], [place], [address] [employer]   Phone [phone]   POCT GLUCOSE - Abnormal; Notable for the following components:    POC Glucose 164 (*)     All other components within normal limits    Narrative:     Performed at:  [facility] [address], [place], [address] [employer]   Phone [phone]   POCT GLUCOSE - Abnormal; Notable for the following components:    POC Glucose 135 (*)     All other components within normal limits    Narrative:     Performed at:  [facility] [address], [place], [address] [employer]   Phone [phone]   POCT GLUCOSE - Abnormal; Notable for the following components:    POC Glucose 141 (*)     All other components within normal limits    Narrative:     Performed at:  [facility] [address],  [place], [facility] [place] [employer]   Phone [phone]   POCT GLUCOSE - Abnormal; Notable for the following components:    POC Glucose 315 (*)     All other components within normal limits    Narrative:     Performed at:  [facility] [address] [facility] [address] [email or web address]   Phone [phone]   POCT GLUCOSE - Abnormal; Notable for the following components:    POC Glucose 331 (*)     All other components within normal limits    Narrative:     Performed at:  [facility] [address], [place], [address] [email or web address]   Phone [phone]   LACTIC ACID, PLASMA    Narrative:     Performed at:  [facility] [address] [facility], [address] [email or web address]   Phone [phone]   LIPASE    Narrative:     Performed at:  [facility] [address] [facility], [address] [email or web address]   Phone [phone]   TROPONIN    Narrative:     Performed at:  [facility] [address], [place], [facility] [employer]   Phone [phone]   CREATININE, RANDOM URINE    Narrative:     Performed at:  [facility]  [address] [facility] [employer] [address]   Phone [phone]   ELECTROLYTES URINE RANDOM    Narrative:     Performed at:  [facility]  [address] [facility] [place] [address]   Phone [phone]   UREA NITROGEN, URINE    Narrative:     Performed at:  [facility]  [address] [facility] [employer] [address]   Phone [phone]   RENAL FUNCTION PANEL   PSA PROSTATIC SPECIFIC ANTIGEN   POCT GLUCOSE   POCT GLUCOSE   POCT GLUCOSE   POCT GLUCOSE   POCT GLUCOSE   POCT GLUCOSE       When ordered only abnormal lab results are displayed. All other labs were within normal range or not returned as of this dictation. EKG:  When ordered, EKG's are interpreted by the Emergency Department Physician in the absence of a cardiologist.  Please see their note for interpretation of EKG. RADIOLOGY:   Non-plain film images such as CT, Ultrasound and MRI are read by the radiologist. Plain radiographic images are visualized and preliminarily interpreted by the ED Provider with the below findings:        Interpretation per the Radiologist below, if available at the time of this note:    XR ABDOMEN (KUB) (SINGLE AP VIEW)   Final Result   Interval left ureteral stent placement. No definite ureteral stone   demonstrated. Left nephrolithiasis noted         FL RETROGRADE PYELOGRAM LEFT   Final Result   Intraprocedural fluoroscopic spot images as above. See separate procedure   report for more information. CT ABDOMEN PELVIS WO CONTRAST Additional Contrast? None   Final Result   Left obstructive uropathy. There is a 7 mm stone in the mid left ureter with   mild to moderate proximal obstruction. Bilateral nephrolithiasis is again noted. A 3.2 cm left renal cyst with   peripheral calcification is stable. Hepatic steatosis. XR CHEST PORTABLE   Final Result   No active cardiopulmonary disease           No results found.         PROCEDURES   Unless otherwise noted below, none     Procedures    CRITICAL CARE TIME   N/A    CONSULTS:  IP CONSULT TO UROLOGY  IP CONSULT TO NEPHROLOGY      EMERGENCY DEPARTMENT COURSE and DIFFERENTIAL DIAGNOSIS/MDM:   Vitals:    Vitals:    08/24/21 1845 08/24/21 1915 08/24/21 2100 08/24/21 2349   BP: (!) 145/85 (!) 142/86 (!) 148/86 (!) 149/85   Pulse: 105 95 90 96   Resp: 16 16 16 16   Temp:   98.5 °F (36.9 °C) 98.1 °F (36.7 °C)   TempSrc:   Oral Oral   SpO2: 93% 97% 94% 91%   Weight:       Height:           Patient was given the following medications:  Medications   albuterol sulfate  (90 Base) MCG/ACT inhaler 2 puff (has no administration in time range)   aspirin chewable tablet 81 mg (has no administration in time range)   azelastine (ASTELIN) 0.1 % nasal spray 1 spray (has no administration in time range) famotidine (PEPCID) tablet 20 mg (has no administration in time range)   insulin lispro (1 Unit Dial) 0-6 Units (4 Units Subcutaneous Given 8/24/21 1852)   insulin lispro (1 Unit Dial) 0-3 Units (2 Units Subcutaneous Given 8/24/21 2102)   glucose (GLUTOSE) 40 % oral gel 15 g (has no administration in time range)   dextrose 50 % IV solution (has no administration in time range)   glucagon (rDNA) injection 1 mg (has no administration in time range)   dextrose 5 % solution (has no administration in time range)   0.9 % sodium chloride infusion ( IntraVENous New Bag 8/24/21 2104)   sodium chloride flush 0.9 % injection 10 mL (10 mLs IntraVENous Not Given 8/24/21 2321)   sodium chloride flush 0.9 % injection 10 mL (has no administration in time range)   0.9 % sodium chloride infusion (has no administration in time range)   magnesium hydroxide (MILK OF MAGNESIA) 400 MG/5ML suspension 30 mL (has no administration in time range)   acetaminophen (TYLENOL) tablet 650 mg (650 mg Oral Given 8/24/21 0700)     Or   acetaminophen (TYLENOL) suppository 650 mg ( Rectal See Alternative 8/24/21 0700)   ondansetron (ZOFRAN) injection 4 mg (has no administration in time range)   morphine (PF) injection 2 mg ( IntraVENous See Alternative 8/24/21 0753)     Or   morphine injection 4 mg (4 mg IntraVENous Given 8/24/21 0753)   heparin (porcine) injection 5,000 Units (5,000 Units Subcutaneous Given 8/24/21 2102)   0.9 % sodium chloride bolus (0 mLs IntraVENous Stopped 8/23/21 2154)   ondansetron (ZOFRAN) injection 4 mg (4 mg IntraVENous Given 8/23/21 2042)   famotidine (PEPCID) injection 20 mg (20 mg IntraVENous Given 8/23/21 2043)   morphine injection 4 mg (4 mg IntraVENous Given 8/23/21 2052)   ceFAZolin (ANCEF) 2000 mg in dextrose 5 % 50 mL IVPB (2,000 mg IntraVENous New Bag 8/24/21 1429)   sterile water for irrigation (2,000 mLs Irrigation Given 8/24/21 1420)   iothalamate (CONRAY) 60 % injection (6 mLs IntraVENous Given 8/24/21 5293)

## 2021-08-24 ENCOUNTER — ANESTHESIA EVENT (OUTPATIENT)
Dept: OPERATING ROOM | Age: 56
DRG: 661 | End: 2021-08-24
Payer: COMMERCIAL

## 2021-08-24 ENCOUNTER — APPOINTMENT (OUTPATIENT)
Dept: GENERAL RADIOLOGY | Age: 56
DRG: 661 | End: 2021-08-24
Payer: COMMERCIAL

## 2021-08-24 ENCOUNTER — ANESTHESIA (OUTPATIENT)
Dept: OPERATING ROOM | Age: 56
DRG: 661 | End: 2021-08-24
Payer: COMMERCIAL

## 2021-08-24 VITALS
SYSTOLIC BLOOD PRESSURE: 100 MMHG | RESPIRATION RATE: 5 BRPM | DIASTOLIC BLOOD PRESSURE: 58 MMHG | OXYGEN SATURATION: 99 %

## 2021-08-24 LAB
ALBUMIN SERPL-MCNC: 3.9 G/DL (ref 3.4–5)
ANION GAP SERPL CALCULATED.3IONS-SCNC: 11 MMOL/L (ref 3–16)
BUN BLDV-MCNC: 39 MG/DL (ref 7–20)
CALCIUM SERPL-MCNC: 8.7 MG/DL (ref 8.3–10.6)
CHLORIDE BLD-SCNC: 100 MMOL/L (ref 99–110)
CHLORIDE URINE RANDOM: 96 MMOL/L
CO2: 27 MMOL/L (ref 21–32)
CREAT SERPL-MCNC: 2.4 MG/DL (ref 0.9–1.3)
CREATININE URINE: 139.4 MG/DL (ref 39–259)
GFR AFRICAN AMERICAN: 34
GFR NON-AFRICAN AMERICAN: 28
GLUCOSE BLD-MCNC: 135 MG/DL (ref 70–99)
GLUCOSE BLD-MCNC: 141 MG/DL (ref 70–99)
GLUCOSE BLD-MCNC: 149 MG/DL (ref 70–99)
GLUCOSE BLD-MCNC: 153 MG/DL (ref 70–99)
GLUCOSE BLD-MCNC: 164 MG/DL (ref 70–99)
GLUCOSE BLD-MCNC: 315 MG/DL (ref 70–99)
GLUCOSE BLD-MCNC: 331 MG/DL (ref 70–99)
HCT VFR BLD CALC: 43.5 % (ref 40.5–52.5)
HEMOGLOBIN: 14.8 G/DL (ref 13.5–17.5)
MCH RBC QN AUTO: 33.6 PG (ref 26–34)
MCHC RBC AUTO-ENTMCNC: 34 G/DL (ref 31–36)
MCV RBC AUTO: 98.8 FL (ref 80–100)
PDW BLD-RTO: 13.9 % (ref 12.4–15.4)
PERFORMED ON: ABNORMAL
PHOSPHORUS: 4.4 MG/DL (ref 2.5–4.9)
PLATELET # BLD: 187 K/UL (ref 135–450)
PMV BLD AUTO: 8.6 FL (ref 5–10.5)
POTASSIUM SERPL-SCNC: 4.3 MMOL/L (ref 3.5–5.1)
POTASSIUM, UR: 47.2 MMOL/L
RBC # BLD: 4.4 M/UL (ref 4.2–5.9)
SODIUM BLD-SCNC: 138 MMOL/L (ref 136–145)
SODIUM URINE: 108 MMOL/L
TOTAL CK: 392 U/L (ref 39–308)
UREA NITROGEN, UR: 1208.1 MG/DL (ref 800–1666)
URIC ACID, SERUM: 9.1 MG/DL (ref 3.5–7.2)
WBC # BLD: 11.7 K/UL (ref 4–11)

## 2021-08-24 PROCEDURE — 6370000000 HC RX 637 (ALT 250 FOR IP)

## 2021-08-24 PROCEDURE — 7100000000 HC PACU RECOVERY - FIRST 15 MIN: Performed by: UROLOGY

## 2021-08-24 PROCEDURE — 82570 ASSAY OF URINE CREATININE: CPT

## 2021-08-24 PROCEDURE — 74420 UROGRAPHY RTRGR +-KUB: CPT

## 2021-08-24 PROCEDURE — 2580000003 HC RX 258: Performed by: UROLOGY

## 2021-08-24 PROCEDURE — 6360000002 HC RX W HCPCS: Performed by: UROLOGY

## 2021-08-24 PROCEDURE — 6360000002 HC RX W HCPCS: Performed by: PHYSICIAN ASSISTANT

## 2021-08-24 PROCEDURE — 6370000000 HC RX 637 (ALT 250 FOR IP): Performed by: PHYSICIAN ASSISTANT

## 2021-08-24 PROCEDURE — 82436 ASSAY OF URINE CHLORIDE: CPT

## 2021-08-24 PROCEDURE — 80069 RENAL FUNCTION PANEL: CPT

## 2021-08-24 PROCEDURE — 3700000001 HC ADD 15 MINUTES (ANESTHESIA): Performed by: UROLOGY

## 2021-08-24 PROCEDURE — 3600000004 HC SURGERY LEVEL 4 BASE: Performed by: UROLOGY

## 2021-08-24 PROCEDURE — 6370000000 HC RX 637 (ALT 250 FOR IP): Performed by: UROLOGY

## 2021-08-24 PROCEDURE — 3700000000 HC ANESTHESIA ATTENDED CARE: Performed by: UROLOGY

## 2021-08-24 PROCEDURE — 3600000014 HC SURGERY LEVEL 4 ADDTL 15MIN: Performed by: UROLOGY

## 2021-08-24 PROCEDURE — C1758 CATHETER, URETERAL: HCPCS | Performed by: UROLOGY

## 2021-08-24 PROCEDURE — 74018 RADEX ABDOMEN 1 VIEW: CPT

## 2021-08-24 PROCEDURE — 6360000004 HC RX CONTRAST MEDICATION: Performed by: UROLOGY

## 2021-08-24 PROCEDURE — 84133 ASSAY OF URINE POTASSIUM: CPT

## 2021-08-24 PROCEDURE — 84540 ASSAY OF URINE/UREA-N: CPT

## 2021-08-24 PROCEDURE — 7100000001 HC PACU RECOVERY - ADDTL 15 MIN: Performed by: UROLOGY

## 2021-08-24 PROCEDURE — 85027 COMPLETE CBC AUTOMATED: CPT

## 2021-08-24 PROCEDURE — 2709999900 HC NON-CHARGEABLE SUPPLY: Performed by: UROLOGY

## 2021-08-24 PROCEDURE — 36415 COLL VENOUS BLD VENIPUNCTURE: CPT

## 2021-08-24 PROCEDURE — 94760 N-INVAS EAR/PLS OXIMETRY 1: CPT

## 2021-08-24 PROCEDURE — 6360000002 HC RX W HCPCS

## 2021-08-24 PROCEDURE — 0T778DZ DILATION OF LEFT URETER WITH INTRALUMINAL DEVICE, VIA NATURAL OR ARTIFICIAL OPENING ENDOSCOPIC: ICD-10-PCS | Performed by: UROLOGY

## 2021-08-24 PROCEDURE — 1200000000 HC SEMI PRIVATE

## 2021-08-24 PROCEDURE — C2617 STENT, NON-COR, TEM W/O DEL: HCPCS | Performed by: UROLOGY

## 2021-08-24 PROCEDURE — BT1F1ZZ FLUOROSCOPY OF LEFT KIDNEY, URETER AND BLADDER USING LOW OSMOLAR CONTRAST: ICD-10-PCS | Performed by: UROLOGY

## 2021-08-24 PROCEDURE — 84550 ASSAY OF BLOOD/URIC ACID: CPT

## 2021-08-24 PROCEDURE — 84300 ASSAY OF URINE SODIUM: CPT

## 2021-08-24 PROCEDURE — 82550 ASSAY OF CK (CPK): CPT

## 2021-08-24 PROCEDURE — 2500000003 HC RX 250 WO HCPCS: Performed by: NURSE ANESTHETIST, CERTIFIED REGISTERED

## 2021-08-24 PROCEDURE — 2580000003 HC RX 258: Performed by: PHYSICIAN ASSISTANT

## 2021-08-24 PROCEDURE — 51798 US URINE CAPACITY MEASURE: CPT

## 2021-08-24 PROCEDURE — 6360000002 HC RX W HCPCS: Performed by: NURSE ANESTHETIST, CERTIFIED REGISTERED

## 2021-08-24 PROCEDURE — C1769 GUIDE WIRE: HCPCS | Performed by: UROLOGY

## 2021-08-24 DEVICE — STENT URET 6FR L26CM PERCFLX HYDR+ TAPR TIP GRAD: Type: IMPLANTABLE DEVICE | Status: FUNCTIONAL

## 2021-08-24 RX ORDER — LIDOCAINE HYDROCHLORIDE 20 MG/ML
INJECTION, SOLUTION EPIDURAL; INFILTRATION; INTRACAUDAL; PERINEURAL PRN
Status: DISCONTINUED | OUTPATIENT
Start: 2021-08-24 | End: 2021-08-24 | Stop reason: SDUPTHER

## 2021-08-24 RX ORDER — SODIUM CHLORIDE 9 MG/ML
INJECTION, SOLUTION INTRAVENOUS CONTINUOUS
Status: DISCONTINUED | OUTPATIENT
Start: 2021-08-24 | End: 2021-08-26 | Stop reason: HOSPADM

## 2021-08-24 RX ORDER — ONDANSETRON 2 MG/ML
4 INJECTION INTRAMUSCULAR; INTRAVENOUS EVERY 6 HOURS PRN
Status: DISCONTINUED | OUTPATIENT
Start: 2021-08-24 | End: 2021-08-26 | Stop reason: HOSPADM

## 2021-08-24 RX ORDER — PROMETHAZINE HYDROCHLORIDE 25 MG/ML
6.25 INJECTION, SOLUTION INTRAMUSCULAR; INTRAVENOUS EVERY 30 MIN PRN
Status: DISCONTINUED | OUTPATIENT
Start: 2021-08-24 | End: 2021-08-24 | Stop reason: HOSPADM

## 2021-08-24 RX ORDER — HYDROCODONE BITARTRATE AND ACETAMINOPHEN 5; 325 MG/1; MG/1
1 TABLET ORAL PRN
Status: DISCONTINUED | OUTPATIENT
Start: 2021-08-24 | End: 2021-08-24 | Stop reason: HOSPADM

## 2021-08-24 RX ORDER — DEXTROSE MONOHYDRATE 25 G/50ML
12.5 INJECTION, SOLUTION INTRAVENOUS PRN
Status: DISCONTINUED | OUTPATIENT
Start: 2021-08-24 | End: 2021-08-26 | Stop reason: HOSPADM

## 2021-08-24 RX ORDER — MORPHINE SULFATE 4 MG/ML
4 INJECTION, SOLUTION INTRAMUSCULAR; INTRAVENOUS
Status: DISCONTINUED | OUTPATIENT
Start: 2021-08-24 | End: 2021-08-26 | Stop reason: HOSPADM

## 2021-08-24 RX ORDER — MAGNESIUM HYDROXIDE 1200 MG/15ML
LIQUID ORAL
Status: COMPLETED | OUTPATIENT
Start: 2021-08-24 | End: 2021-08-24

## 2021-08-24 RX ORDER — FENTANYL CITRATE 50 UG/ML
50 INJECTION, SOLUTION INTRAMUSCULAR; INTRAVENOUS EVERY 5 MIN PRN
Status: DISCONTINUED | OUTPATIENT
Start: 2021-08-24 | End: 2021-08-24 | Stop reason: HOSPADM

## 2021-08-24 RX ORDER — SODIUM CHLORIDE 9 MG/ML
25 INJECTION, SOLUTION INTRAVENOUS PRN
Status: DISCONTINUED | OUTPATIENT
Start: 2021-08-24 | End: 2021-08-26 | Stop reason: HOSPADM

## 2021-08-24 RX ORDER — FENTANYL CITRATE 50 UG/ML
INJECTION, SOLUTION INTRAMUSCULAR; INTRAVENOUS PRN
Status: DISCONTINUED | OUTPATIENT
Start: 2021-08-24 | End: 2021-08-24 | Stop reason: SDUPTHER

## 2021-08-24 RX ORDER — MORPHINE SULFATE 2 MG/ML
2 INJECTION, SOLUTION INTRAMUSCULAR; INTRAVENOUS
Status: DISCONTINUED | OUTPATIENT
Start: 2021-08-24 | End: 2021-08-26 | Stop reason: HOSPADM

## 2021-08-24 RX ORDER — HYDROCODONE BITARTRATE AND ACETAMINOPHEN 5; 325 MG/1; MG/1
2 TABLET ORAL PRN
Status: DISCONTINUED | OUTPATIENT
Start: 2021-08-24 | End: 2021-08-24 | Stop reason: HOSPADM

## 2021-08-24 RX ORDER — SODIUM CHLORIDE 0.9 % (FLUSH) 0.9 %
10 SYRINGE (ML) INJECTION EVERY 12 HOURS SCHEDULED
Status: DISCONTINUED | OUTPATIENT
Start: 2021-08-24 | End: 2021-08-26 | Stop reason: HOSPADM

## 2021-08-24 RX ORDER — HYDROMORPHONE HCL 110MG/55ML
0.5 PATIENT CONTROLLED ANALGESIA SYRINGE INTRAVENOUS EVERY 5 MIN PRN
Status: DISCONTINUED | OUTPATIENT
Start: 2021-08-24 | End: 2021-08-24 | Stop reason: HOSPADM

## 2021-08-24 RX ORDER — SODIUM CHLORIDE 0.9 % (FLUSH) 0.9 %
10 SYRINGE (ML) INJECTION PRN
Status: DISCONTINUED | OUTPATIENT
Start: 2021-08-24 | End: 2021-08-26 | Stop reason: HOSPADM

## 2021-08-24 RX ORDER — FENTANYL CITRATE 50 UG/ML
25 INJECTION, SOLUTION INTRAMUSCULAR; INTRAVENOUS EVERY 5 MIN PRN
Status: DISCONTINUED | OUTPATIENT
Start: 2021-08-24 | End: 2021-08-24 | Stop reason: HOSPADM

## 2021-08-24 RX ORDER — INSULIN LISPRO 100 [IU]/ML
0-3 INJECTION, SOLUTION INTRAVENOUS; SUBCUTANEOUS NIGHTLY
Status: DISCONTINUED | OUTPATIENT
Start: 2021-08-24 | End: 2021-08-26 | Stop reason: HOSPADM

## 2021-08-24 RX ORDER — NICOTINE POLACRILEX 4 MG
15 LOZENGE BUCCAL PRN
Status: DISCONTINUED | OUTPATIENT
Start: 2021-08-24 | End: 2021-08-26 | Stop reason: HOSPADM

## 2021-08-24 RX ORDER — FAMOTIDINE 20 MG/1
20 TABLET, FILM COATED ORAL DAILY
Status: DISCONTINUED | OUTPATIENT
Start: 2021-08-24 | End: 2021-08-26 | Stop reason: HOSPADM

## 2021-08-24 RX ORDER — DEXAMETHASONE SODIUM PHOSPHATE 4 MG/ML
INJECTION, SOLUTION INTRA-ARTICULAR; INTRALESIONAL; INTRAMUSCULAR; INTRAVENOUS; SOFT TISSUE PRN
Status: DISCONTINUED | OUTPATIENT
Start: 2021-08-24 | End: 2021-08-24 | Stop reason: SDUPTHER

## 2021-08-24 RX ORDER — HYDROMORPHONE HCL 110MG/55ML
0.25 PATIENT CONTROLLED ANALGESIA SYRINGE INTRAVENOUS EVERY 5 MIN PRN
Status: DISCONTINUED | OUTPATIENT
Start: 2021-08-24 | End: 2021-08-24 | Stop reason: HOSPADM

## 2021-08-24 RX ORDER — HEPARIN SODIUM 5000 [USP'U]/ML
5000 INJECTION, SOLUTION INTRAVENOUS; SUBCUTANEOUS EVERY 8 HOURS SCHEDULED
Status: DISCONTINUED | OUTPATIENT
Start: 2021-08-24 | End: 2021-08-26 | Stop reason: HOSPADM

## 2021-08-24 RX ORDER — INSULIN LISPRO 100 [IU]/ML
0-6 INJECTION, SOLUTION INTRAVENOUS; SUBCUTANEOUS
Status: DISCONTINUED | OUTPATIENT
Start: 2021-08-24 | End: 2021-08-26 | Stop reason: HOSPADM

## 2021-08-24 RX ORDER — MEPERIDINE HYDROCHLORIDE 25 MG/ML
12.5 INJECTION INTRAMUSCULAR; INTRAVENOUS; SUBCUTANEOUS EVERY 5 MIN PRN
Status: DISCONTINUED | OUTPATIENT
Start: 2021-08-24 | End: 2021-08-24 | Stop reason: HOSPADM

## 2021-08-24 RX ORDER — ALBUTEROL SULFATE 90 UG/1
2 AEROSOL, METERED RESPIRATORY (INHALATION) 4 TIMES DAILY PRN
Status: DISCONTINUED | OUTPATIENT
Start: 2021-08-24 | End: 2021-08-26 | Stop reason: HOSPADM

## 2021-08-24 RX ORDER — DEXTROSE MONOHYDRATE 50 MG/ML
100 INJECTION, SOLUTION INTRAVENOUS PRN
Status: DISCONTINUED | OUTPATIENT
Start: 2021-08-24 | End: 2021-08-26 | Stop reason: HOSPADM

## 2021-08-24 RX ORDER — ASPIRIN 81 MG/1
81 TABLET, CHEWABLE ORAL DAILY
Status: DISCONTINUED | OUTPATIENT
Start: 2021-08-24 | End: 2021-08-26 | Stop reason: HOSPADM

## 2021-08-24 RX ORDER — DIPHENHYDRAMINE HYDROCHLORIDE 50 MG/ML
12.5 INJECTION INTRAMUSCULAR; INTRAVENOUS
Status: DISCONTINUED | OUTPATIENT
Start: 2021-08-24 | End: 2021-08-24 | Stop reason: HOSPADM

## 2021-08-24 RX ORDER — INSULIN LISPRO 100 [IU]/ML
INJECTION, SOLUTION INTRAVENOUS; SUBCUTANEOUS
Status: COMPLETED
Start: 2021-08-24 | End: 2021-08-24

## 2021-08-24 RX ORDER — ACETAMINOPHEN 325 MG/1
650 TABLET ORAL EVERY 6 HOURS PRN
Status: DISCONTINUED | OUTPATIENT
Start: 2021-08-24 | End: 2021-08-26 | Stop reason: HOSPADM

## 2021-08-24 RX ORDER — MORPHINE SULFATE 4 MG/ML
INJECTION, SOLUTION INTRAMUSCULAR; INTRAVENOUS
Status: COMPLETED
Start: 2021-08-24 | End: 2021-08-24

## 2021-08-24 RX ORDER — ONDANSETRON 2 MG/ML
INJECTION INTRAMUSCULAR; INTRAVENOUS PRN
Status: DISCONTINUED | OUTPATIENT
Start: 2021-08-24 | End: 2021-08-24 | Stop reason: SDUPTHER

## 2021-08-24 RX ORDER — AZELASTINE 1 MG/ML
1 SPRAY, METERED NASAL 2 TIMES DAILY PRN
Status: DISCONTINUED | OUTPATIENT
Start: 2021-08-24 | End: 2021-08-26 | Stop reason: HOSPADM

## 2021-08-24 RX ORDER — PROPOFOL 10 MG/ML
INJECTION, EMULSION INTRAVENOUS PRN
Status: DISCONTINUED | OUTPATIENT
Start: 2021-08-24 | End: 2021-08-24 | Stop reason: SDUPTHER

## 2021-08-24 RX ORDER — ACETAMINOPHEN 650 MG/1
650 SUPPOSITORY RECTAL EVERY 6 HOURS PRN
Status: DISCONTINUED | OUTPATIENT
Start: 2021-08-24 | End: 2021-08-26 | Stop reason: HOSPADM

## 2021-08-24 RX ADMIN — INSULIN LISPRO 1 UNITS: 100 INJECTION, SOLUTION INTRAVENOUS; SUBCUTANEOUS at 00:39

## 2021-08-24 RX ADMIN — MORPHINE SULFATE 4 MG: 4 INJECTION INTRAVENOUS at 00:42

## 2021-08-24 RX ADMIN — SODIUM CHLORIDE: 9 INJECTION, SOLUTION INTRAVENOUS at 00:42

## 2021-08-24 RX ADMIN — HEPARIN SODIUM 5000 UNITS: 5000 INJECTION INTRAVENOUS; SUBCUTANEOUS at 21:02

## 2021-08-24 RX ADMIN — FENTANYL CITRATE 100 MCG: 50 INJECTION, SOLUTION INTRAMUSCULAR; INTRAVENOUS at 14:38

## 2021-08-24 RX ADMIN — MORPHINE SULFATE 4 MG: 4 INJECTION INTRAVENOUS at 07:53

## 2021-08-24 RX ADMIN — INSULIN LISPRO 4 UNITS: 100 INJECTION, SOLUTION INTRAVENOUS; SUBCUTANEOUS at 18:52

## 2021-08-24 RX ADMIN — LIDOCAINE HYDROCHLORIDE 100 MG: 20 INJECTION, SOLUTION EPIDURAL; INFILTRATION; INTRACAUDAL; PERINEURAL at 14:39

## 2021-08-24 RX ADMIN — MORPHINE SULFATE 4 MG: 4 INJECTION, SOLUTION INTRAMUSCULAR; INTRAVENOUS at 00:42

## 2021-08-24 RX ADMIN — ACETAMINOPHEN 650 MG: 325 TABLET ORAL at 07:00

## 2021-08-24 RX ADMIN — ONDANSETRON 4 MG: 2 INJECTION INTRAMUSCULAR; INTRAVENOUS at 14:47

## 2021-08-24 RX ADMIN — SODIUM CHLORIDE 1000 ML: 9 INJECTION, SOLUTION INTRAVENOUS at 12:01

## 2021-08-24 RX ADMIN — INSULIN LISPRO 2 UNITS: 100 INJECTION, SOLUTION INTRAVENOUS; SUBCUTANEOUS at 21:02

## 2021-08-24 RX ADMIN — SODIUM CHLORIDE: 9 INJECTION, SOLUTION INTRAVENOUS at 21:04

## 2021-08-24 RX ADMIN — PROPOFOL 200 MG: 10 INJECTION, EMULSION INTRAVENOUS at 14:39

## 2021-08-24 RX ADMIN — CEFAZOLIN 2000 MG: 10 INJECTION, POWDER, FOR SOLUTION INTRAVENOUS at 14:29

## 2021-08-24 RX ADMIN — DEXAMETHASONE SODIUM PHOSPHATE 8 MG: 4 INJECTION, SOLUTION INTRAMUSCULAR; INTRAVENOUS at 14:47

## 2021-08-24 ASSESSMENT — PULMONARY FUNCTION TESTS
PIF_VALUE: 23
PIF_VALUE: 30
PIF_VALUE: 0
PIF_VALUE: 1
PIF_VALUE: 22
PIF_VALUE: 2
PIF_VALUE: 2
PIF_VALUE: 23
PIF_VALUE: 24
PIF_VALUE: 22
PIF_VALUE: 3
PIF_VALUE: 26
PIF_VALUE: 22
PIF_VALUE: 22
PIF_VALUE: 23
PIF_VALUE: 22
PIF_VALUE: 21
PIF_VALUE: 22
PIF_VALUE: 25
PIF_VALUE: 18
PIF_VALUE: 23
PIF_VALUE: 1
PIF_VALUE: 23
PIF_VALUE: 23
PIF_VALUE: 22
PIF_VALUE: 22
PIF_VALUE: 26
PIF_VALUE: 8
PIF_VALUE: 23
PIF_VALUE: 3
PIF_VALUE: 21
PIF_VALUE: 25
PIF_VALUE: 23

## 2021-08-24 ASSESSMENT — PAIN SCALES - GENERAL
PAINLEVEL_OUTOF10: 3
PAINLEVEL_OUTOF10: 5
PAINLEVEL_OUTOF10: 4

## 2021-08-24 NOTE — ED NOTES
Report to Jolanta Began in purvi op at bedside.   Up to floor per PCA     Guillermina Coulter RN  08/24/21 8073

## 2021-08-24 NOTE — PROGRESS NOTES
Pt seen in  ED, admission completed. Pt is alert and oriented x 4. Pt lives at home with his wife, and is being admitted for nephrolithiasis. Plan of care updated, all questions answered.

## 2021-08-24 NOTE — PROGRESS NOTES
Patient awake, alert, VSS, denies pain, phase I discharge criteria met, waiting for room on 4T.   Wife at the bedside

## 2021-08-24 NOTE — CONSULTS
Urology Consult Note  [facility]     Patient: Elisa Tate MRN: [id number]  Room/Bed: ED-0005/05   YOB: 1965  Age/Sex: 49 y.o.male  Admission Date: 8/23/2021     Date of Service:  8/24/2021    Consulting Provider: QUINCY Fortune - CNP  Admitting/Requesting Physician: Jemal Brown MD  Primary Care Physician: Opal Kat MD    Reason for Consult: Ureteral Stone, ONOFRE    ASSESSMENT/PLAN     48 yo male presents with LLQ abd pain with n/v  CT a/p shows 7mm mid left ureteral stone   ONOFRE Cr 2.4, baseline 1.2  UA with no infection, 3+ uric acid noted  No fever or leukocytosis    Recommendations:  -Keep npo for cysto LEFT stent today  -Urology will follow, call with any questions    All patient questions were answered. He understands the plan as listed above. HISTORY     Chief Complaint:   Chief Complaint   Patient presents with    Emesis     arrived per self d/t vomiting since yesterday morning at 0600       History of Present Illness: Elisa Tate is a 49 y.o. male with left ureteral stones. Onset of symptoms was yesterday at 6am with improiving course since that time. Symptoms are aggravated by ureteral stone. Symptoms improved with morphine and zofran. Associated symptoms include LLQ abd pain. Patient also reports n/v. He has tried the following treatments: morphine, zofran, sx today. Past Medical History:  He has a past medical history of Diabetes mellitus ([address] Hypertension, Obesity, unspecified, Other abnormal blood chemistry, Seasonal allergies, and Unspecified sleep apnea. Hospital Problem List:  Active Problems:    Nephrolithiasis  Resolved Problems:    * No resolved hospital problems. *      Past Surgical History:  He has a past surgical history that includes Cataract removal with implant (2009, 2010) and Tooth Extraction (08/2018). Social History:  He reports that he has never smoked. He has never used smokeless tobacco. He reports current alcohol use. He reports that he does not use drugs. Family History:  family history includes High Blood Pressure in his father; Other in his father. Allergies: Allergies   Allergen Reactions    Ace Inhibitors      COUGH      Pollen Extract        Medications:  Scheduled Meds:   aspirin  81 mg Oral Daily    famotidine  20 mg Oral Daily    insulin lispro  0-6 Units Subcutaneous TID WC    insulin lispro  0-3 Units Subcutaneous Nightly    sodium chloride flush  10 mL Intravenous 2 times per day    heparin (porcine)  5,000 Units Subcutaneous 3 times per day     Continuous Infusions:   dextrose      sodium chloride 100 mL/hr at 08/24/21 0042    sodium chloride       PRN Meds:albuterol sulfate HFA, azelastine, glucose, dextrose, glucagon (rDNA), dextrose, sodium chloride flush, sodium chloride, magnesium hydroxide, acetaminophen **OR** acetaminophen, ondansetron, morphine **OR** morphine    Review of Systems:  Pertinent positives/negatives reviewed in HPI. All other systems reviewed and negative, unless noted below. Constitutional: Negative  Genitourinary: see HPI  HEENT: Negative   Cardiovascular: Negative   Respiratory: Negative   Gastrointestinal: Negative   Musculoskeletal: Negative   Neurological: Negative   Psychiatric: Negative   Integumentary: Negative     PHYSICAL EXAM     Vitals:    08/24/21 0752   BP: (!) 145/96   Pulse: 85   Resp: 14   Temp: 98.7 °F (37.1 °C)   SpO2: 92%     CONSTITUTIONAL: The patient is well nourished/developed, with no distress noted. NEUROLOGICAL/PSYCHIATRIC: Oriented to place and time, normal affected noted. NECK: The neck is symmetrical and supple, with no masses noted. CARDIOVASCULAR: Regular rate and rhythm, no evidence of swelling noted. RESPIRATORY: Normal respiratory effort with no wheezing noted. ABDOMEN: Abdomen soft, non-tender, non-distended. No enlarged liver or spleen. No hernias noted. Stool occult blood not indicated.    SKIN: Skin appears normal.  LYMPHATICS: No adenopathy noted. CVA: No CVA tenderness bilaterally. GENITOURINARY: The penis is without rash or lesions and meatus with expected size and location. The scrotum appears normal. Bilateral testicles appears to be of normal size and location. No masses or tenderness noted of testicles or epididymis. Ins/Outs:  No intake or output data in the 24 hours ending 08/24/21 1112    LABS     CBC   Lab Results   Component Value Date    WBC 11.7 08/24/2021    RBC 4.40 08/24/2021    HGB 14.8 08/24/2021    HCT 43.5 08/24/2021    MCV 98.8 08/24/2021    MCH 33.6 08/24/2021    MCHC 34.0 08/24/2021    RDW 13.9 08/24/2021     08/24/2021    MPV 8.6 08/24/2021     BMP   Lab Results   Component Value Date     08/24/2021    K 4.3 08/24/2021    K 5.0 08/23/2021     08/24/2021    CO2 27 08/24/2021    BUN 39 08/24/2021    CREATININE 2.4 08/24/2021    GLUCOSE 149 08/24/2021    CALCIUM 8.7 08/24/2021     Urinalysis:   Lab Results   Component Value Date    COLORU YELLOW 08/23/2021    GLUCOSEU Negative 08/23/2021    BLOODU LARGE 08/23/2021    NITRU Negative 08/23/2021    LEUKOCYTESUR Negative 08/23/2021     Urine culture: No results for input(s): Soila Vyas in the last 72 hours. PSA: No results found for: PSA      IMAGING     CT ABDOMEN PELVIS WO CONTRAST Additional Contrast? None    Result Date: 8/23/2021  EXAMINATION: CT OF THE ABDOMEN AND PELVIS WITHOUT CONTRAST 8/23/2021 3:42 pm TECHNIQUE: CT of the abdomen and pelvis was performed without the administration of intravenous contrast. Multiplanar reformatted images are provided for review. Dose modulation, iterative reconstruction, and/or weight based adjustment of the mA/kV was utilized to reduce the radiation dose to as low as reasonably achievable. COMPARISON: None.  HISTORY: ORDERING SYSTEM PROVIDED HISTORY: Left abdominal pain, acute renal insufficiency/ONOFRE TECHNOLOGIST PROVIDED HISTORY: Reason for exam:->Left abdominal pain, acute renal insufficiency/ONOFRE Additional Contrast?->None Decision Support Exception - unselect if not a suspected or confirmed emergency medical condition->Emergency Medical Condition (MA) Reason for Exam: Left abdominal pain, acute renal insufficiency/ONOFRE Acuity: Unknown Type of Exam: Unknown FINDINGS: Lower Chest: The lung bases are clear. The visualized heart is unremarkable. Genitourinary: The kidneys are symmetrical in size. In the inferior pole of the right kidney there is a nonobstructing 3 mm calculus. In the inferior pole of the left kidney, there are calculi measuring 8 mm in aggregate. In the superior pole of the left kidney there is a 3.2 cm cyst with stable peripheral calcification. Mild to moderate left hydroureteronephrosis has developed, secondary to a obstructing 7 x 5 mm stone at the mid ureter (level of L3-4). Urinary bladder and prostate gland are unremarkable. Organs: There is diffuse fatty infiltration of the liver. No gallbladder stones are seen. The pancreas, spleen and adrenal glands are unremarkable. GI/Bowel: The stomach and small bowel are unremarkable. Appendix is normal in caliber. There is no focal mural thickening of the colon. Pelvis: There is no free fluid or mass. Peritoneum/Retroperitoneum: There is no adenopathy or aneurysm. Bones/Soft Tissues: Severe multilevel spondylosis. At L2-3 there is moderate narrowing of the central canal with posterior disc osteophyte complex. At L5-S1 there is severe right foraminal narrowing due to osseous hypertrophy and disc bulge. Abdominal wall is unremarkable. Left obstructive uropathy. There is a 7 mm stone in the mid left ureter with mild to moderate proximal obstruction. Bilateral nephrolithiasis is again noted. A 3.2 cm left renal cyst with peripheral calcification is stable. Hepatic steatosis.      XR CHEST PORTABLE    Result Date: 8/23/2021  EXAMINATION: ONE XRAY VIEW OF THE CHEST 8/23/2021 2:48 pm COMPARISON: 03/01/2012 HISTORY:

## 2021-08-24 NOTE — H&P
Hospital Medicine History & Physical      Patient Name: Rosaura Calvert    : 1965    PCP: Maria Dolores Modi MD    Date of Service:  Patient seen and examined on 2021     Chief Complaint:  Abdominal pain, nausea, vomiting    History Of Present Illness:    Rosaura Calvert is a 49 y.o. male who presented to ED with complaint of left lower quadrant abdominal pain with associated nausea and vomiting vomiting which began yesterday at 6 AM.  He reports 6 episodes of vomiting yesterday and 2 today. Abdominal pain has waxed and waned in intensity. He also reports left sided low back pain which he relates to muscle soreness from vomiting. He denies fever, chest pain, shortness of breath, cough, edema, diarrhea, constipation, pain with urination, urinary frequency, urinary retention. He reports a history of kidney stones. Past Medical History:    Patient has a past medical history of Diabetes mellitus ([facility] [address] Hypertension, Obesity, unspecified, Other abnormal blood chemistry, Seasonal allergies, and Unspecified sleep apnea. Past Surgical History:    Patient has a past surgical history that includes Cataract removal with implant (, ) and Tooth Extraction (2018). Medications Prior to Admission:      Prior to Admission medications    Medication Sig Start Date End Date Taking?  Authorizing Provider   Ascorbic Acid (VITAMIN C CR) 1500 MG TBCR Take 4,500 mg by mouth    Historical Provider, MD   metFORMIN (GLUCOPHAGE-XR) 500 MG extended release tablet TAKE 1 TABLET BY MOUTH EVERY DAY 21   Maria Dolores Modi MD   azelastine (ASTELIN) 0.1 % nasal spray 1 spray by Nasal route 2 times daily Use in each nostril as directed 21   Maria Dolores Modi MD   glimepiride (AMARYL) 2 MG tablet TAKE 1 TABLET BY MOUTH EVERY DAY IN THE MORNING 21   Maria Dolores Modi MD   losartan (COZAAR) 50 MG tablet TAKE 1 TABLET BY MOUTH EVERY DAY 21   Maria Dolores Modi MD   aspirin 81 MG EC cardiologist:  \"Sinus rhythm, rate 78, intervals normal, axis normal, no acute injury pattern, prior EKG not available\"      ASSESSMENT/PLAN:    Nephrolithiasis  Pain control  IVF  NPO after midnight in anticipation of stent placement in AM  Urology consulted    ONOFRE  Cr baseline 1.2, today 2.2  Likely due to nephrolithiasis. +Uric acid crystals in urine. Will check uric acid level  IVF  Avoid nephrotoxic medications  Renally dose medications  Monitor for electrolyte abnormalities    Leukocytosis  Likely reactive  Patient afebrile  UA negative for UTI  Will monitor    DM2  Last HbA1c was 6.1  Hold home PO meds   Accuchecks, SSI  Hypoglycemia protocol    ASA    HTN  Hold home losartan for ONOFRE  Monitor BP closely and consider adding alternative antihypertensive if needed      DVT prophylaxis: Lovenox  Probiotic if on abx: N/A    Diet: ADULT DIET; Regular; 4 carb choices (60 gm/meal)  Diet NPO Exceptions are: Sips of Water with Meds  Code Status: Full Code    Consults:  IP CONSULT TO UROLOGY    Disposition: Admit to Inpatient   ELOS: Greater than two midnights due to medical therapy     Kerry Parker PA-C    Thank you Teresa Sweeney MD for the opportunity to be involved in this patient's care. If you have any questions or concerns please feel free to contact me at [phone].

## 2021-08-24 NOTE — PROGRESS NOTES
[facility]    8/24/2021 2:52 PM        Name: Paolo Madden . Admitted: 8/23/2021  Primary Care Provider: Hilda Montalvo MD (Tel: [phone])                        Subjective:  . No acute events overnight. Resting well. Pain control. Diet ok. Labs reviewed  Denies any chest pain sob.      Reviewed interval ancillary notes    Current Medications  albuterol sulfate  (90 Base) MCG/ACT inhaler 2 puff, 4x Daily PRN  aspirin chewable tablet 81 mg, Daily  azelastine (ASTELIN) 0.1 % nasal spray 1 spray, BID PRN  famotidine (PEPCID) tablet 20 mg, Daily  insulin lispro (1 Unit Dial) 0-6 Units, TID WC  insulin lispro (1 Unit Dial) 0-3 Units, Nightly  glucose (GLUTOSE) 40 % oral gel 15 g, PRN  dextrose 50 % IV solution, PRN  glucagon (rDNA) injection 1 mg, PRN  dextrose 5 % solution, PRN  0.9 % sodium chloride infusion, Continuous  sodium chloride flush 0.9 % injection 10 mL, 2 times per day  sodium chloride flush 0.9 % injection 10 mL, PRN  0.9 % sodium chloride infusion, PRN  magnesium hydroxide (MILK OF MAGNESIA) 400 MG/5ML suspension 30 mL, Daily PRN  acetaminophen (TYLENOL) tablet 650 mg, Q6H PRN   Or  acetaminophen (TYLENOL) suppository 650 mg, Q6H PRN  ondansetron (ZOFRAN) injection 4 mg, Q6H PRN  morphine (PF) injection 2 mg, Q3H PRN   Or  morphine injection 4 mg, Q3H PRN  heparin (porcine) injection 5,000 Units, 3 times per day  meperidine (DEMEROL) injection 12.5 mg, Q5 Min PRN  HYDROmorphone (DILAUDID) injection 0.25 mg, Q5 Min PRN  HYDROmorphone (DILAUDID) injection 0.5 mg, Q5 Min PRN  fentaNYL (SUBLIMAZE) injection 25 mcg, Q5 Min PRN  fentaNYL (SUBLIMAZE) injection 50 mcg, Q5 Min PRN  HYDROcodone-acetaminophen (NORCO) 5-325 MG per tablet 1 tablet, PRN   Or  HYDROcodone-acetaminophen (NORCO) 5-325 MG per tablet 2 tablet, PRN  promethazine (PHENERGAN) injection 6.25 mg, Q30 Min PRN  diphenhydrAMINE (BENADRYL) injection 12.5 mg, Once PRN  ceFAZolin (ANCEF) 2000 mg in dextrose 5 % 50 mL IVPB, Once    Dexamethasone Sodium Phosphate injection, PRN  ondansetron (ZOFRAN) injection, PRN  propofol injection, PRN  lidocaine PF 2 % injection, PRN  fentaNYL (SUBLIMAZE) injection, PRN        Objective:  BP (!) 155/91   Pulse 88   Temp 98 °F (36.7 °C) (Temporal)   Resp 16   Ht 5' 9\" (1.753 m)   Wt 254 lb 3.2 oz (115.3 kg)   SpO2 97%   BMI 37.54 kg/m²   No intake or output data in the 24 hours ending 08/24/21 1452   Wt Readings from Last 3 Encounters:   08/23/21 254 lb 3.2 oz (115.3 kg)   06/30/21 257 lb (116.6 kg)   10/02/20 248 lb (112.5 kg)       General appearance:  Appears comfortable  Eyes: Sclera clear. Pupils equal.  ENT: Moist oral mucosa. Trachea midline, no adenopathy. Cardiovascular: Regular rhythm, normal S1, S2. No murmur. No edema in lower extremities  Respiratory: Not using accessory muscles. Good inspiratory effort. Clear to auscultation bilaterally, no wheeze or crackles. GI: Abdomen soft, no tenderness, not distended, normal bowel sounds  Musculoskeletal: No cyanosis in digits, neck supple  Neurology: CN 2-12 grossly intact. No speech or motor deficits  Psych: Normal affect.  Alert and oriented in time, place and person  Skin: Warm, dry, normal turgor    Labs and Tests:  CBC:   Recent Labs     08/23/21  1438 08/24/21  0556   WBC 12.4* 11.7*   HGB 16.9 14.8    187     BMP:    Recent Labs     08/23/21  1438 08/24/21  0556    138   K 5.0 4.3   CL 99 100   CO2 25 27   BUN 41* 39*   CREATININE 2.2* 2.4*   GLUCOSE 220* 149*     Hepatic:   Recent Labs     08/23/21  1438   AST 27   ALT 33   BILITOT 0.8   ALKPHOS 54       Discussed care with family and patient             Spent 30  minutes with patient and family at bedside and on unit reviewing medical records and labs, spent greater than 50% time counseling patient and family on diagnosis and plan   Problem List  Active Problems:    Nephrolithiasis  Resolved Problems:    * No resolved hospital problems. *       Assessment & Plan:   1. Nephrolithiasis. -urology  Consulted  - NPO. Plan for possible stent.      ONOFRE  - creatine slighty up from [employer] [facility]  - Nephrology consult    DM  -SSI        Diet: Diet NPO Exceptions are: Sips of Water with Meds  Code:Full Code  DVT PPX lovenox       Hilda Borges MD   8/24/2021 2:52 PM

## 2021-08-24 NOTE — CONSULTS
[facility] Consult Note  [facility]    Provider: Donelda Meckel, MD MD Patient ID:  Admission Date: 2021 Name: Chase Baugh Date: n/a MRN: [id number]   Patient Location: ED- : 1965  Attending: Darene Carrel, MD Date of Service: 2021  PCP: Solomon Chapa MD     Diagnoses:  1. Kidney stone    2. ONOFRE (acute kidney injury) ([facility] [address]    3. Obstructing LEFT mid ureteral stone w hydronephrosis  4. Prostate nodule    Assessment/Plan:  Urgent cystoscopy with LEFT sided ureteral stent. The decision to proceed to the OR (as well as this consult note), will be within 24 hours of the surgery happening. -NPO  -pain control  -expect future ESWL vs ureteroscopy. HU on CT scan only 400-500 (bone was up to 1100). BMI 37 and 250#. Skin to stone is about 16cm  -He will need a PSA performed tomorrow morning. In the future he will require a prostate needle biopsy. All the patients questions were answered in detail. He understands the plan as listed above. CC:   Chief Complaint   Patient presents with    Emesis     arrived per self d/t vomiting since yesterday morning at 0600       HPI: Jenni Heath is a 49 y.o. male. I saw the patient today for LLQ pain, and associated symptoms of nausea and emesis, that have been present for about 36 hours. Symptoms relieved by medications received in the ER and aggravated by movement. He has tried the following treatments: he has a history of stones. He is also a diabetic patient. No previous stone surgery. Past medical History:   He has a past medical history of Diabetes mellitus ([facility] [address] Hypertension, Obesity, unspecified, Other abnormal blood chemistry, Seasonal allergies, and Unspecified sleep apnea.     Past Surgical History:  He has a past surgical history that includes Cataract removal with implant (2009, 2010) and Tooth Extraction (08/2018). Allergies: Allergies   Allergen Reactions    Ace Inhibitors      COUGH      Pollen Extract        Social History:  He reports that he has never smoked. He has never used smokeless tobacco. He reports current alcohol use. He reports that he does not use drugs. Family History:  family history includes High Blood Pressure in his father; Other in his father. Medications:   Scheduled Meds:   aspirin  81 mg Oral Daily    famotidine  20 mg Oral Daily    insulin lispro  0-6 Units Subcutaneous TID WC    insulin lispro  0-3 Units Subcutaneous Nightly    sodium chloride flush  10 mL Intravenous 2 times per day    heparin (porcine)  5,000 Units Subcutaneous 3 times per day     Continuous Infusions:   dextrose      sodium chloride 100 mL/hr at 08/24/21 0042    sodium chloride       PRN Meds:albuterol sulfate HFA, azelastine, glucose, dextrose, glucagon (rDNA), dextrose, sodium chloride flush, sodium chloride, magnesium hydroxide, acetaminophen **OR** acetaminophen, ondansetron, morphine **OR** morphine    Review of Systems:  Constitutional: Negative for fever    Genitourinary: see HPI  Eyes: negative for sudden change in vision  EENT: no complaints  Cardiovascular: Negative for chest pain  Respiratory: Negative for shortness of breath  Gastrointestinal: Negative for nausea  Musculoskeletal: Negative for back pain   Neurological: Negative for weakness  Psychiatric: Negative for anxiety  Integumentary: Negative for rashes or adenopathy     Physical Exam:  Vitals:    08/24/21 0752   BP: (!) 145/96   Pulse: 85   Resp: 14   Temp: 98.7 °F (37.1 °C)   SpO2: 92%     Constitutional: NAD, well-developed, well-nourished. HEENT: MMM. Hearing intact. PERRL  Neck: no thyroid masses appreciated. Trachea is midline. Neck appears unremarkable   Lymph: no palpable adenopathy in supraclavicular, or axillary lymph nodes  Cardiovascular: Regular rate.  No

## 2021-08-24 NOTE — ANESTHESIA POSTPROCEDURE EVALUATION
Department of Anesthesiology  Postprocedure Note    Patient: Silvia Arthur  MRN: [id number]  YOB: 1965  Date of evaluation: 2021  Time:  3:32 PM     Procedure Summary     Date: 21 Room / Location: [address] [place] [address]    Anesthesia Start: 7 Anesthesia Stop:     Procedure: CYSTOSCOPY, LEFT RETROGRADE PYELOGRAM, PLACEMENT OF LEFT URETERAL STENT (Left ) Diagnosis: (Left Ureteral Calculus)    Surgeons: Ruben Ellis MD Responsible Provider: Coleta Apgar, MD    Anesthesia Type: general ASA Status: 3          Anesthesia Type: general    Anibal Phase I: Anibal Score: 5    Anibal Phase II:      Last vitals: Reviewed and per EMR flowsheets.        Anesthesia Post Evaluation    Level of consciousness: awake  Complications: no

## 2021-08-24 NOTE — ANESTHESIA PRE PROCEDURE
Department of Anesthesiology  Preprocedure Note       Name:  Bigg Rg   Age:  49 y.o.  :  1965                                          MRN:  [id number]         Date:  2021      Surgeon: Charlotte Roland):  Mandy Sampson MD    Procedure: Procedure(s):  CYSTOSCOPY, LEFT RETROGRADE PYELOGRAM, PLACEMENT OF LEFT URETERAL STENT    Medications prior to admission:   Prior to Admission medications    Medication Sig Start Date End Date Taking? Authorizing Provider   potassium citrate (UROCIT-K) 5 MEQ (540 MG) extended release tablet Take 5 mEq by mouth 3 times daily (with meals)   Yes Historical Provider, MD   metFORMIN (GLUCOPHAGE-XR) 500 MG extended release tablet TAKE 1 TABLET BY MOUTH EVERY DAY 21  Yes Reji Edge MD   glimepiride (AMARYL) 2 MG tablet TAKE 1 TABLET BY MOUTH EVERY DAY IN THE MORNING 21  Yes Reji Edge MD   losartan (COZAAR) 50 MG tablet TAKE 1 TABLET BY MOUTH EVERY DAY 21  Yes Reji Edge MD   aspirin 81 MG EC tablet Take 81 mg by mouth daily   Yes Historical Provider, MD   famotidine (PEPCID) 20 MG tablet Take 20 mg by mouth daily.      Yes Historical Provider, MD   azelastine (ASTELIN) 0.1 % nasal spray 1 spray by Nasal route 2 times daily Use in each nostril as directed 21   Reji Edge MD   hyoscyamine (LEVSIN) 125 MCG tablet Take 1 tablet by mouth every 4 hours as needed for Cramping 19   [facility]   albuterol sulfate HFA (PROAIR HFA) 108 (90 Base) MCG/ACT inhaler Inhale 2 puffs into the lungs 4 times daily as needed for Wheezing 18   Reji Edge MD   naproxen (NAPROSYN) 500 MG tablet Take 1 tablet by mouth 2 times daily (with meals)  Patient taking differently: Take 500 mg by mouth 2 times daily as needed  12/16/15   Reji Edge MD   glucose blood VI test strips (ONE TOUCH ULTRA TEST) strip Tests once daily 14   Reji Edge MD   Blood Glucose Monitoring Suppl (ONE TOUCH ULTRA) by Does not apply route.       Historical Provider, MD       Current medications:    Current Facility-Administered Medications   Medication Dose Route Frequency Provider Last Rate Last Admin    albuterol sulfate  (90 Base) MCG/ACT inhaler 2 puff  2 puff Inhalation 4x Daily PRN Jade Sloan MD        aspirin chewable tablet 81 mg  81 mg Oral Daily Mary Meek MD        azelastine (ASTELIN) 0.1 % nasal spray 1 spray  1 spray Each Nostril BID PRN Mary Meek MD        famotidine (PEPCID) tablet 20 mg  20 mg Oral Daily Mary Meek MD        insulin lispro (1 Unit Dial) 0-6 Units  0-6 Units Subcutaneous TID WC Mary Meek MD        insulin lispro (1 Unit Dial) 0-3 Units  0-3 Units Subcutaneous Nightly Mary Meek MD   1 Units at 08/24/21 0039    glucose (GLUTOSE) 40 % oral gel 15 g  15 g Oral PRN Mary Meek MD        dextrose 50 % IV solution  12.5 g IntraVENous PRN Jade Sloan MD        glucagon (rDNA) injection 1 mg  1 mg Intramuscular PRN Mary Meek MD        dextrose 5 % solution  100 mL/hr IntraVENous PRN Mary Meek MD        0.9 % sodium chloride infusion   IntraVENous Continuous Mary Meek  mL/hr at 08/24/21 1201 1,000 mL at 08/24/21 1201    sodium chloride flush 0.9 % injection 10 mL  10 mL IntraVENous 2 times per day Jade Sloan MD        sodium chloride flush 0.9 % injection 10 mL  10 mL IntraVENous PRN Mary Meek MD        0.9 % sodium chloride infusion  25 mL IntraVENous PRN Mary Meek MD        magnesium hydroxide (MILK OF MAGNESIA) 400 MG/5ML suspension 30 mL  30 mL Oral Daily PRN Mary Meek MD        acetaminophen (TYLENOL) tablet 650 mg  650 mg Oral Q6H PRN Mary Meek MD   650 mg at 08/24/21 0700    Or    acetaminophen (TYLENOL) suppository 650 mg  650 mg Rectal Q6H PRN Mary Meek MD        ondansetron (ZOFRAN) injection 4 mg  4 mg IntraVENous Q6H PRN Mary Meek MD        morphine (PF) injection 2 mg  2 mg IntraVENous Q3H PRN Mary Angella Mancia MD        Or    morphine injection 4 mg  4 mg IntraVENous Q3H PRN Blanca Tyson MD   4 mg at 08/24/21 0753    heparin (porcine) injection 5,000 Units  5,000 Units Subcutaneous 3 times per day Blanca Tyson MD           Allergies:     Allergies   Allergen Reactions    Ace Inhibitors      COUGH      Pollen Extract        Problem List:    Patient Active Problem List   Diagnosis Code    Essential hypertension I10    Obesity E66.9    Sleep apnea G47.30    Type 2 diabetes mellitus without complication, without long-term current use of insulin ([facility]) E11.9    Mild intermittent asthma without complication R93.60    Carpal tunnel syndrome of right wrist G56.01    Nephrolithiasis N20.0       Past Medical History:        Diagnosis Date    Diabetes mellitus ([address]     Hypertension     Obesity, unspecified     Other abnormal blood chemistry     Seasonal allergies     Unspecified sleep apnea        Past Surgical History:        Procedure Laterality Date    CATARACT REMOVAL WITH IMPLANT  2009, 2010    B eyes    TOOTH EXTRACTION  08/2018       Social History:    Social History     Tobacco Use    Smoking status: Never Smoker    Smokeless tobacco: Never Used   Substance Use Topics    Alcohol use: Yes     Comment: occasional alcholo use                                Counseling given: Not Answered      Vital Signs (Current):   Vitals:    08/24/21 0701 08/24/21 0752 08/24/21 1313 08/24/21 1331   BP: (!) 154/93 (!) 145/96 (!) 155/95 (!) 155/91   Pulse: 90 85 95 88   Resp: 18 14 16 16   Temp: 98 °F (36.7 °C) 98.7 °F (37.1 °C)  98 °F (36.7 °C)   TempSrc: Oral   Temporal   SpO2: 97% 92% 97% 97%   Weight:       Height:                                                  BP Readings from Last 3 Encounters:   08/24/21 (!) 155/91   06/30/21 136/82   10/02/20 132/74       NPO Status: Time of last liquid consumption: 2300                        Time of last solid consumption: 2300                        Date of last liquid consumption: 08/23/21                        Date of last solid food consumption: 08/23/21    BMI:   Wt Readings from Last 3 Encounters:   08/23/21 254 lb 3.2 oz (115.3 kg)   06/30/21 257 lb (116.6 kg)   10/02/20 248 lb (112.5 kg)     Body mass index is 37.54 kg/m².     CBC:   Lab Results   Component Value Date    WBC 11.7 08/24/2021    RBC 4.40 08/24/2021    HGB 14.8 08/24/2021    HCT 43.5 08/24/2021    MCV 98.8 08/24/2021    RDW 13.9 08/24/2021     08/24/2021       CMP:   Lab Results   Component Value Date     08/24/2021    K 4.3 08/24/2021    K 5.0 08/23/2021     08/24/2021    CO2 27 08/24/2021    BUN 39 08/24/2021    CREATININE 2.4 08/24/2021    GFRAA 34 08/24/2021    GFRAA >60 03/01/2012    AGRATIO 1.3 08/23/2021    LABGLOM 28 08/24/2021    GLUCOSE 149 08/24/2021    PROT 7.6 08/23/2021    PROT 7.6 03/01/2012    CALCIUM 8.7 08/24/2021    BILITOT 0.8 08/23/2021    ALKPHOS 54 08/23/2021    AST 27 08/23/2021    ALT 33 08/23/2021       POC Tests:   Recent Labs     08/24/21  1339   POCGLU 135*       Coags: No results found for: PROTIME, INR, APTT    HCG (If Applicable): No results found for: PREGTESTUR, PREGSERUM, HCG, HCGQUANT     ABGs: No results found for: PHART, PO2ART, WBQ8WGG, HJI7GQM, BEART, B4ISQPYB     Type & Screen (If Applicable):  No results found for: LABABO, LABRH    Drug/Infectious Status (If Applicable):  No results found for: HIV, HEPCAB    COVID-19 Screening (If Applicable): No results found for: COVID19        Anesthesia Evaluation  Patient summary reviewed and Nursing notes reviewed  Airway: Mallampati: II  TM distance: >3 FB   Neck ROM: full  Mouth opening: > = 3 FB Dental:          Pulmonary:   (+) COPD:  sleep apnea: on CPAP,  asthma:                            Cardiovascular:  Exercise tolerance: good (>4 METS),   (+) hypertension: moderate,                   Neuro/Psych:   (+) neuromuscular disease:,             GI/Hepatic/Renal:   (+) morbid obesity          Endo/Other: (+) DiabetesType II DM, well controlled, , .                 Abdominal:   (+) obese,           Vascular: Other Findings:             Anesthesia Plan      general     ASA 3       Induction: intravenous. MIPS: Postoperative opioids intended, Prophylactic antiemetics administered and Postoperative trial extubation. Anesthetic plan and risks discussed with patient. Plan discussed with CRNA.                   Joe Gee MD   8/24/2021

## 2021-08-24 NOTE — CONSULTS
MD Haylee Veras MD Rice Kuster, MD               Office: [phone]                      Fax: [phone]             [facility]                   NEPHROLOGY INITIAL CONSULT NOTE:     PATIENT NAME: Elisa Tate  : 1965  MRN: [id number]  REASON FOR CONSULT: For evaluation and management of Acute Kidney Injury . (My recommendations will be communicated by way of shared medical record.)  Requesting provider: Dr Rayne Hernandez:   - check CK w/ large blood, RBC 3 only   - NS at 100 cc/hr  - hold home: K, Losartan, Naproxen, Metformin (use ISS for DM)  - Urology f/u - pending consult- d/w ER Nurse and [address] to [place] [facility] for consult   - check UA w/ microscopy, urine lytes,  - monitor PVR w/ bladder scan for boswell insertion need. - at higher risk for decompensation, needing closer monitoring. D/C plan from renal stand point:  - severe ONOFRE, might need 3-4 days  : f/u w/ me at [address] [facility] in 1 week after d/c. (I will arrange.)      D/W pt, family, ER nurse , hospitalist         IMPRESSION:       Admitted for:  Nephrolithiasis [N20.0]  Left obstructive uropathy. 7 mm stone in the mid left ureter with mild to moderate proximal obstruction. Bilateral nephrolithiasis is again noted. 3.2 cm left renal cyst with peripheral calcification is stable. ONOFRE (on CKD: 2-3A):   -  Oligouric  - BL Scr- lowest recentlly 1.2-1.3 ---> 2.2 on admission -> worsening  -: Etiology of ONOFRE - presumed ATN due to hypovolemia + obstruction   - UA and CT scan reviewed     Associated problems:   - Volume status: euvolemic  : BP:HTN - no need for tight control  - lytes and a/b: stable     Other major problems: Management per primary and other consulting teams.    Hospital Problems         Last Modified POA    Nephrolithiasis 2021 Yes        : other supportive care :   - Check daily renal function panel with electrolytes-phosphorus  - Strict monitoring of I/Os, daily weight  - Renal feeds/diet  - Current medications reviewed. - Nephrotoxic medications have been discontinued. - Dose adjusted and appropriate. - Dose meds for eGFR <15 mL/min/1.73m2 during ONOFRE    - Avoid heavy opioids due to renal failure - may use very low dose dilaudid / fentanyl with close monitoring of CNS and respiratory depression. Please refer to the orders. High Complexity. Multiple complex problems. Discussed with patient and treatment team-    Time spent > 30 (~35) minutes. Thank you for allowing me to participate in this patient's care. Please do not hesitate to contact me anytime. We will follow along with you. Tyrone Blake MD,  [facility] [address] [place]: [phone] or [address]  Fax: [phone]        ========================================================   ========================================================       CHIEF COMPLAINT:   Chief Complaint   Patient presents with    Emesis     arrived per self d/t vomiting since yesterday morning at 0600     History Obtained From:  patient + treatment team + Electronic Medical Records    HPI: Mr. Maribel Shields is a 49 y.o. male with significant past medical history as below:   Past Medical History:   Diagnosis Date    Diabetes mellitus ([facility] [address]     Hypertension     Obesity, unspecified     Other abnormal blood chemistry     Seasonal allergies     Unspecified sleep apnea       Presents with Emesis (arrived per self d/t vomiting since yesterday morning at 0600)    Admitted with Nephrolithiasis [N20.0]   Found to have ONOFRE , so we are called for that. No current active complaints. Patient denied chest pain / dizziness/lightheadedness/syncope/ SOB / leg edema.    Regarding: ONOFRE on CKD   · Duration: acute on chronic   · Location: kidneys  · Severity: Severe    · Timing: continous  · Context (ex: related to condition):   , refer to my assessment / impression. · Modifying factors (ex: medications, interventions):   , refer to my plan / recommendation. · Associated signs & symptoms (ex: edema, SOB): As mentioned above in CC and HPI      Past medical, Surgical, Social, Family medical history reviewed by me. PAST MEDICAL HISTORY:   Past Medical History:   Diagnosis Date    Diabetes mellitus ([facility] [address]     Hypertension     Obesity, unspecified     Other abnormal blood chemistry     Seasonal allergies     Unspecified sleep apnea      PAST SURGICAL HISTORY:   Past Surgical History:   Procedure Laterality Date    CATARACT REMOVAL WITH IMPLANT  2009, 2010    B eyes    TOOTH EXTRACTION  08/2018     FAMILY HISTORY:   Family History   Problem Relation Age of Onset    High Blood Pressure Father     Other Father      SOCIAL HISTORY:   Social History     Socioeconomic History    Marital status:      Spouse name: None    Number of children: None    Years of education: None    Highest education level: None   Occupational History    None   Tobacco Use    Smoking status: Never Smoker    Smokeless tobacco: Never Used   Vaping Use    Vaping Use: Never used   Substance and Sexual Activity    Alcohol use: Yes     Comment: occasional alcholo use    Drug use: No    Sexual activity: Yes     Partners: Female   Other Topics Concern    None   Social History Narrative    None     Social Determinants of Health     Financial Resource Strain:     Difficulty of Paying Living Expenses:    Food Insecurity:     Worried About Running Out of Food in the Last Year:     Ran Out of Food in the Last Year:    Transportation Needs:     Lack of Transportation (Medical):      Lack of Transportation (Non-Medical):    Physical Activity:     Days of Exercise per Week:     Minutes of Exercise per Session:    Stress:     Feeling of Stress :    Social Connections:     Frequency of Communication with Friends and Family:     Frequency of Social Gatherings with Friends and Family:     Attends Jew Services:     Active Member of Clubs or Organizations:     Attends Club or Organization Meetings:     Marital Status:    Intimate Partner Violence:     Fear of Current or Ex-Partner:     Emotionally Abused:     Physically Abused:     Sexually Abused:           MEDICATIONS: reviewed by me. Medications Prior to Admission:  No current facility-administered medications on file prior to encounter. Current Outpatient Medications on File Prior to Encounter   Medication Sig Dispense Refill    potassium citrate (UROCIT-K) 5 MEQ (540 MG) extended release tablet Take 5 mEq by mouth 3 times daily (with meals)      metFORMIN (GLUCOPHAGE-XR) 500 MG extended release tablet TAKE 1 TABLET BY MOUTH EVERY DAY 90 tablet 1    glimepiride (AMARYL) 2 MG tablet TAKE 1 TABLET BY MOUTH EVERY DAY IN THE MORNING 90 tablet 0    losartan (COZAAR) 50 MG tablet TAKE 1 TABLET BY MOUTH EVERY DAY 90 tablet 0    aspirin 81 MG EC tablet Take 81 mg by mouth daily      famotidine (PEPCID) 20 MG tablet Take 20 mg by mouth daily.  azelastine (ASTELIN) 0.1 % nasal spray 1 spray by Nasal route 2 times daily Use in each nostril as directed 1 Bottle 5    hyoscyamine (LEVSIN) 125 MCG tablet Take 1 tablet by mouth every 4 hours as needed for Cramping 50 tablet 3    albuterol sulfate HFA (PROAIR HFA) 108 (90 Base) MCG/ACT inhaler Inhale 2 puffs into the lungs 4 times daily as needed for Wheezing 3 Inhaler 2    naproxen (NAPROSYN) 500 MG tablet Take 1 tablet by mouth 2 times daily (with meals) (Patient taking differently: Take 500 mg by mouth 2 times daily as needed ) 60 tablet 5    glucose blood VI test strips (ONE TOUCH ULTRA TEST) strip Tests once daily 100 each 3    Blood Glucose Monitoring Suppl (ONE TOUCH ULTRA) by Does not apply route.              Current Facility-Administered Medications:     albuterol sulfate  (90 Base) MCG/ACT inhaler 2 puff, 2 puff, Inhalation, 4x Daily PRN, Dick Walsh PA-C    aspirin chewable tablet 81 mg, 81 mg, Oral, Daily, Vicky Srivastava PA-C    azelastine (ASTELIN) 0.1 % nasal spray 1 spray, 1 spray, Each Nostril, BID PRN, Dick Walsh PA-C    famotidine (PEPCID) tablet 20 mg, 20 mg, Oral, Daily, Vicky Srivastava PA-C    insulin lispro (1 Unit Dial) 0-6 Units, 0-6 Units, Subcutaneous, TID WC, Vicky Srivastava PA-C    insulin lispro (1 Unit Dial) 0-3 Units, 0-3 Units, Subcutaneous, Nightly, Dick Walsh PA-C, 1 Units at 08/24/21 0039    glucose (GLUTOSE) 40 % oral gel 15 g, 15 g, Oral, PRN, Vicky Srivastava PA-C    dextrose 50 % IV solution, 12.5 g, Intravenous, PRN, Vicky Srivastava PA-C    glucagon (rDNA) injection 1 mg, 1 mg, Intramuscular, PRN, Vicky Srivastava PA-C    dextrose 5 % solution, 100 mL/hr, Intravenous, PRN, Dick Walsh PA-C    0.9 % sodium chloride infusion, , Intravenous, Continuous, Dick Walsh PA-C, Last Rate: 100 mL/hr at 08/24/21 0042, New Bag at 08/24/21 0042    sodium chloride flush 0.9 % injection 10 mL, 10 mL, Intravenous, 2 times per day, Vicky Srivastava PA-C    sodium chloride flush 0.9 % injection 10 mL, 10 mL, Intravenous, PRN, Dick Walsh PA-C    0.9 % sodium chloride infusion, 25 mL, Intravenous, PRN, Dick Walsh PA-C    magnesium hydroxide (MILK OF MAGNESIA) 400 MG/5ML suspension 30 mL, 30 mL, Oral, Daily PRN, Vicky Srivastava PA-C    acetaminophen (TYLENOL) tablet 650 mg, 650 mg, Oral, Q6H PRN, 650 mg at 08/24/21 0700 **OR** acetaminophen (TYLENOL) suppository 650 mg, 650 mg, Rectal, Q6H PRN, Dick Walsh PA-C    ondansetron (ZOFRAN) injection 4 mg, 4 mg, Intravenous, Q6H PRN, Dick Walsh PA-C    morphine (PF) injection 2 mg, 2 mg, Intravenous, Q3H PRN **OR** morphine injection 4 mg, 4 mg, Intravenous, Q3H PRN, Dick Walsh PA-C, 4 mg at 08/24/21 0753    heparin (porcine) injection 5,000 Units, 5,000 Units, Subcutaneous, 3 times per day, Sujey Arnett MD    Current Outpatient Medications:     potassium citrate (UROCIT-K) 5 MEQ (540 MG) extended release tablet, Take 5 mEq by mouth 3 times daily (with meals), Disp: , Rfl:     metFORMIN (GLUCOPHAGE-XR) 500 MG extended release tablet, TAKE 1 TABLET BY MOUTH EVERY DAY, Disp: 90 tablet, Rfl: 1    glimepiride (AMARYL) 2 MG tablet, TAKE 1 TABLET BY MOUTH EVERY DAY IN THE MORNING, Disp: 90 tablet, Rfl: 0    losartan (COZAAR) 50 MG tablet, TAKE 1 TABLET BY MOUTH EVERY DAY, Disp: 90 tablet, Rfl: 0    aspirin 81 MG EC tablet, Take 81 mg by mouth daily, Disp: , Rfl:     famotidine (PEPCID) 20 MG tablet, Take 20 mg by mouth daily. , Disp: , Rfl:     azelastine (ASTELIN) 0.1 % nasal spray, 1 spray by Nasal route 2 times daily Use in each nostril as directed, Disp: 1 Bottle, Rfl: 5    hyoscyamine (LEVSIN) 125 MCG tablet, Take 1 tablet by mouth every 4 hours as needed for Cramping, Disp: 50 tablet, Rfl: 3    albuterol sulfate HFA (PROAIR HFA) 108 (90 Base) MCG/ACT inhaler, Inhale 2 puffs into the lungs 4 times daily as needed for Wheezing, Disp: 3 Inhaler, Rfl: 2    naproxen (NAPROSYN) 500 MG tablet, Take 1 tablet by mouth 2 times daily (with meals) (Patient taking differently: Take 500 mg by mouth 2 times daily as needed ), Disp: 60 tablet, Rfl: 5    glucose blood VI test strips (ONE TOUCH ULTRA TEST) strip, Tests once daily, Disp: 100 each, Rfl: 3    Blood Glucose Monitoring Suppl (ONE TOUCH ULTRA), by Does not apply route.  , Disp: , Rfl:       Allergies reviewed by me: Ace inhibitors and Pollen extract    REVIEW OF SYSTEMS:  As mentioned in chief complaint and HPI , Subjective   All other 10-point review of systems: negative. PHYSICAL EXAM:  Recent vital signs and recent I/Os reviewed by me.      Wt Readings from Last 3 Encounters:   08/23/21 254 lb 3.2 oz (115.3 kg)   06/30/21 257 lb (116.6 kg)   10/02/20 248 lb (112.5 kg)     BP Readings from Last 3 Encounters: 08/24/21 (!) 145/96   06/30/21 136/82   10/02/20 132/74     Patient Vitals for the past 24 hrs:   BP Temp Temp src Pulse Resp SpO2 Height Weight   08/24/21 0752 (!) 145/96 98.7 °F (37.1 °C) -- 85 14 92 % -- --   08/24/21 0701 (!) 154/93 98 °F (36.7 °C) Oral 90 18 97 % -- --   08/24/21 0046 (!) 149/93 98.7 °F (37.1 °C) Oral 82 16 97 % -- --   08/23/21 2046 (!) 151/94 98.9 °F (37.2 °C) Oral 74 18 100 % -- --   08/23/21 1746 (!) 161/90 -- -- 76 18 98 % -- --   08/23/21 1351 (!) 169/97 98.3 °F (36.8 °C) Oral 75 20 99 % 5' 9\" (1.753 m) 254 lb 3.2 oz (115.3 kg)     No intake or output data in the 24 hours ending 08/24/21 0815    Physical Exam  Vitals reviewed. Constitutional:       General: He is not in acute distress. Comments: Obese body habitus   HENT:      Head: Normocephalic and atraumatic. Right Ear: External ear normal.      Left Ear: External ear normal.      Mouth/Throat:      Mouth: Mucous membranes are not dry. Eyes:      General: No scleral icterus. Conjunctiva/sclera: Conjunctivae normal.   Neck:      Thyroid: No thyroid mass. Vascular: No JVD. Trachea: Trachea normal.   Cardiovascular:      Rate and Rhythm: Normal rate and regular rhythm. Pulmonary:      Effort: Pulmonary effort is normal.      Breath sounds: Normal breath sounds. Abdominal:      General: Bowel sounds are normal.      Palpations: Abdomen is soft. Musculoskeletal:         General: No deformity. Cervical back: Neck supple. Skin:     General: Skin is warm and dry. Neurological:      Mental Status: He is alert and oriented to person, place, and time. Psychiatric:         Behavior: Behavior normal.              DATA:  Diagnostic tests reviewed by me for today's visit:   (AS NEEDED FOR MY EVALUATION AND MANAGEMENT).        Recent Labs     08/23/21  1438 08/24/21  0556   WBC 12.4* 11.7*   HCT 49.8 43.5    187     Iron Saturation:  No components found for: PERCENTFE  FERRITIN:  No results found for: FERRITIN  IRON:  No results found for: IRON  TIBC:  No results found for: TIBC    Recent Labs     08/23/21  1438 08/24/21  0556    138   K 5.0 4.3   CL 99 100   CO2 25 27   BUN 41* 39*   CREATININE 2.2* 2.4*     Recent Labs     08/23/21  1438 08/24/21  0556   CALCIUM 9.9 8.7   PHOS  --  4.4     No results for input(s): PH, PCO2, PO2 in the last 72 hours.     Invalid input(s): [place]    ABG:  No results found for: PH, PCO2, PO2, HCO3, BE, THGB, TCO2, O2SAT  VBG:  No results found for: PHVEN, RET4VYO, BEVEN, T7THDRSK    LDH:  No results found for: LDH  Uric Acid:    Lab Results   Component Value Date    LABURIC 9.1 08/24/2021       PT/INR:  No results found for: PROTIME, INR  Warfarin PT/INR:  No components found for: PTPATWAR, PTINRWAR  PTT:  No results found for: APTT, PTT[APTT}  Last 3 Troponin:    Lab Results   Component Value Date    TROPONINI <0.01 08/23/2021       U/A:    Lab Results   Component Value Date    COLORU YELLOW 08/23/2021    PROTEINU 30 08/23/2021    PHUR 5.0 08/23/2021    WBCUA 2 08/23/2021    RBCUA 3 08/23/2021    YEAST neg 09/25/2020    BACTERIA neg 09/25/2020    CLARITYU CLOUDY 08/23/2021    SPECGRAV 1.022 08/23/2021    LEUKOCYTESUR Negative 08/23/2021    UROBILINOGEN 0.2 08/23/2021    BILIRUBINUR Negative 08/23/2021    BLOODU LARGE 08/23/2021    GLUCOSEU Negative 08/23/2021     Microalbumen/Creatinine ratio:  No components found for: RUCREAT  24 Hour Urine for Protein:  No components found for: RAWUPRO, UHRS3, CHWB03BG, UTV3  24 Hour Urine for Creatinine Clearance:  No components found for: CREAT4, UHRS10, UTV10  Urine Toxicology:  No components found for: IAMMENTA, IBARBIT, IBENZO, ICOCAINE, IMARTHC, IOPIATES, IPHENCYC    HgBA1c:    Lab Results   Component Value Date    LABA1C 6.1 06/08/2021     RPR:  No results found for: RPR  HIV:  No results found for: HIV  TATA:  No results found for: ANATITER, TATA  RF:  No results found for: RF  DSDNA:  No components found for: DNA  AMYLASE:  No results found for: AMYLASE  LIPASE:    Lab Results   Component Value Date    LIPASE 53.0 08/23/2021     Fibrinogen Level:  No components found for: FIB       BELOW MENTIONED RADIOLOGY STUDY RESULTS BY ME (AS NEEDED FOR MY EVALUATION AND MANAGEMENT). CT ABDOMEN PELVIS WO CONTRAST Additional Contrast? None    Result Date: 8/23/2021  EXAMINATION: CT OF THE ABDOMEN AND PELVIS WITHOUT CONTRAST 8/23/2021 3:42 pm TECHNIQUE: CT of the abdomen and pelvis was performed without the administration of intravenous contrast. Multiplanar reformatted images are provided for review. Dose modulation, iterative reconstruction, and/or weight based adjustment of the mA/kV was utilized to reduce the radiation dose to as low as reasonably achievable. COMPARISON: None. HISTORY: ORDERING SYSTEM PROVIDED HISTORY: Left abdominal pain, acute renal insufficiency/ONOFRE TECHNOLOGIST PROVIDED HISTORY: Reason for exam:->Left abdominal pain, acute renal insufficiency/ONOFRE Additional Contrast?->None Decision Support Exception - unselect if not a suspected or confirmed emergency medical condition->Emergency Medical Condition (MA) Reason for Exam: Left abdominal pain, acute renal insufficiency/ONOFRE Acuity: Unknown Type of Exam: Unknown FINDINGS: Lower Chest: The lung bases are clear. The visualized heart is unremarkable. Genitourinary: The kidneys are symmetrical in size. In the inferior pole of the right kidney there is a nonobstructing 3 mm calculus. In the inferior pole of the left kidney, there are calculi measuring 8 mm in aggregate. In the superior pole of the left kidney there is a 3.2 cm cyst with stable peripheral calcification. Mild to moderate left hydroureteronephrosis has developed, secondary to a obstructing 7 x 5 mm stone at the mid ureter (level of L3-4). Urinary bladder and prostate gland are unremarkable. Organs: There is diffuse fatty infiltration of the liver. No gallbladder stones are seen.   The pancreas, spleen and adrenal glands are unremarkable. GI/Bowel: The stomach and small bowel are unremarkable. Appendix is normal in caliber. There is no focal mural thickening of the colon. Pelvis: There is no free fluid or mass. Peritoneum/Retroperitoneum: There is no adenopathy or aneurysm. Bones/Soft Tissues: Severe multilevel spondylosis. At L2-3 there is moderate narrowing of the central canal with posterior disc osteophyte complex. At L5-S1 there is severe right foraminal narrowing due to osseous hypertrophy and disc bulge. Abdominal wall is unremarkable. Left obstructive uropathy. There is a 7 mm stone in the mid left ureter with mild to moderate proximal obstruction. Bilateral nephrolithiasis is again noted. A 3.2 cm left renal cyst with peripheral calcification is stable. Hepatic steatosis. XR CHEST PORTABLE    Result Date: 8/23/2021  EXAMINATION: ONE XRAY VIEW OF THE CHEST 8/23/2021 2:48 pm COMPARISON: 03/01/2012 HISTORY: ORDERING SYSTEM PROVIDED HISTORY: Abdominal pain TECHNOLOGIST PROVIDED HISTORY: Reason for exam:->Abdominal pain Reason for Exam: Emesis (arrived per self d/t vomiting since yesterday morning at 0600) Acuity: Unknown Type of Exam: Unknown FINDINGS: Heart size and pulmonary vasculature within normal limits. Lungs clear.  Costophrenic angles sharp     No active cardiopulmonary disease

## 2021-08-24 NOTE — PROGRESS NOTES
Patient transferred from OR to PACU, to yet responding to physical stimuli, VSS, oral airway in place, will monitor.

## 2021-08-24 NOTE — ED NOTES
Bladder scan completed. 200 cc in bladder.  Spoke with nephrorologist and urologist.  Surgery at . [facility], [address] [facility]  08/24/21 5650

## 2021-08-24 NOTE — ED NOTES
Bed: 05  Expected date:   Expected time:   Means of arrival:   Comments:  fulmer Judeen Klinefelter, RN  08/23/21 2025

## 2021-08-25 LAB
ALBUMIN SERPL-MCNC: 3.7 G/DL (ref 3.4–5)
ANION GAP SERPL CALCULATED.3IONS-SCNC: 11 MMOL/L (ref 3–16)
BUN BLDV-MCNC: 39 MG/DL (ref 7–20)
CALCIUM SERPL-MCNC: 8.5 MG/DL (ref 8.3–10.6)
CHLORIDE BLD-SCNC: 104 MMOL/L (ref 99–110)
CO2: 22 MMOL/L (ref 21–32)
CREAT SERPL-MCNC: 1.7 MG/DL (ref 0.9–1.3)
EKG ATRIAL RATE: 78 BPM
EKG DIAGNOSIS: NORMAL
EKG P AXIS: 70 DEGREES
EKG P-R INTERVAL: 164 MS
EKG Q-T INTERVAL: 368 MS
EKG QRS DURATION: 94 MS
EKG QTC CALCULATION (BAZETT): 419 MS
EKG R AXIS: -1 DEGREES
EKG T AXIS: 5 DEGREES
EKG VENTRICULAR RATE: 78 BPM
GFR AFRICAN AMERICAN: 51
GFR NON-AFRICAN AMERICAN: 42
GLUCOSE BLD-MCNC: 187 MG/DL (ref 70–99)
GLUCOSE BLD-MCNC: 191 MG/DL (ref 70–99)
GLUCOSE BLD-MCNC: 193 MG/DL (ref 70–99)
GLUCOSE BLD-MCNC: 242 MG/DL (ref 70–99)
GLUCOSE BLD-MCNC: 260 MG/DL (ref 70–99)
PERFORMED ON: ABNORMAL
PHOSPHORUS: 3.5 MG/DL (ref 2.5–4.9)
POTASSIUM SERPL-SCNC: 4.6 MMOL/L (ref 3.5–5.1)
PROSTATE SPECIFIC ANTIGEN: 0.76 NG/ML (ref 0–4)
SODIUM BLD-SCNC: 137 MMOL/L (ref 136–145)

## 2021-08-25 PROCEDURE — 1200000000 HC SEMI PRIVATE

## 2021-08-25 PROCEDURE — 93010 ELECTROCARDIOGRAM REPORT: CPT | Performed by: INTERNAL MEDICINE

## 2021-08-25 PROCEDURE — 6360000002 HC RX W HCPCS: Performed by: UROLOGY

## 2021-08-25 PROCEDURE — 6370000000 HC RX 637 (ALT 250 FOR IP): Performed by: UROLOGY

## 2021-08-25 PROCEDURE — 80069 RENAL FUNCTION PANEL: CPT

## 2021-08-25 PROCEDURE — 84153 ASSAY OF PSA TOTAL: CPT

## 2021-08-25 PROCEDURE — 2580000003 HC RX 258: Performed by: UROLOGY

## 2021-08-25 RX ORDER — OXYCODONE HYDROCHLORIDE AND ACETAMINOPHEN 5; 325 MG/1; MG/1
1 TABLET ORAL EVERY 8 HOURS PRN
Qty: 15 TABLET | Refills: 0 | Status: SHIPPED | OUTPATIENT
Start: 2021-08-25 | End: 2021-09-01

## 2021-08-25 RX ADMIN — INSULIN LISPRO 2 UNITS: 100 INJECTION, SOLUTION INTRAVENOUS; SUBCUTANEOUS at 16:41

## 2021-08-25 RX ADMIN — SODIUM CHLORIDE: 9 INJECTION, SOLUTION INTRAVENOUS at 16:44

## 2021-08-25 RX ADMIN — FAMOTIDINE 20 MG: 20 TABLET ORAL at 09:25

## 2021-08-25 RX ADMIN — HEPARIN SODIUM 5000 UNITS: 5000 INJECTION INTRAVENOUS; SUBCUTANEOUS at 13:52

## 2021-08-25 RX ADMIN — ACETAMINOPHEN 650 MG: 325 TABLET ORAL at 06:07

## 2021-08-25 RX ADMIN — INSULIN LISPRO 2 UNITS: 100 INJECTION, SOLUTION INTRAVENOUS; SUBCUTANEOUS at 20:37

## 2021-08-25 RX ADMIN — INSULIN LISPRO 1 UNITS: 100 INJECTION, SOLUTION INTRAVENOUS; SUBCUTANEOUS at 12:00

## 2021-08-25 RX ADMIN — HEPARIN SODIUM 5000 UNITS: 5000 INJECTION INTRAVENOUS; SUBCUTANEOUS at 20:37

## 2021-08-25 RX ADMIN — INSULIN LISPRO 1 UNITS: 100 INJECTION, SOLUTION INTRAVENOUS; SUBCUTANEOUS at 09:28

## 2021-08-25 RX ADMIN — ASPIRIN 81 MG: 81 TABLET, CHEWABLE ORAL at 09:25

## 2021-08-25 RX ADMIN — MORPHINE SULFATE 4 MG: 4 INJECTION INTRAVENOUS at 13:52

## 2021-08-25 RX ADMIN — SODIUM CHLORIDE: 9 INJECTION, SOLUTION INTRAVENOUS at 06:04

## 2021-08-25 RX ADMIN — HEPARIN SODIUM 5000 UNITS: 5000 INJECTION INTRAVENOUS; SUBCUTANEOUS at 06:05

## 2021-08-25 ASSESSMENT — PAIN DESCRIPTION - PAIN TYPE: TYPE: ACUTE PAIN

## 2021-08-25 ASSESSMENT — PAIN SCALES - GENERAL
PAINLEVEL_OUTOF10: 8
PAINLEVEL_OUTOF10: 4
PAINLEVEL_OUTOF10: 4

## 2021-08-25 ASSESSMENT — PAIN DESCRIPTION - LOCATION: LOCATION: PENIS

## 2021-08-25 NOTE — PLAN OF CARE
Problem: Urinary Elimination:  Goal: Ability to achieve a balanced intake and output will improve  Description: Ability to achieve a balanced intake and output will improve  Outcome: Ongoing

## 2021-08-25 NOTE — PROGRESS NOTES
PM assessment completed. Pt resting in chair, denies pain at this time. Pt up independently in room and quan. Tolerating activity well. Pt reports urine red and cloudy after stent placement. No needs voiced at this time. Fall precautions in place, hourly rounding, call light and belongings in reach, bed in lowest position, wheels locked in place, side rails up x 2, walkways free of clutter.

## 2021-08-25 NOTE — PROGRESS NOTES
Jhonny Alma, MD Audrea Bolognese, MD Leona Opitz, MD               Office: [phone]                      Fax: [phone]             [facility]                   NEPHROLOGY INPATIENT PROGRESS NOTE:     PATIENT NAME: Olga Grider  : 1965  MRN: [id number]       RECOMMENDATIONS:   - checkd Sherrill w/ large blood, RBC 3 only slightly elevated   - f/u trend  -keep NS at 100 cc/hr  - hold home: K, Losartan, Naproxen, Metformin (use ISS for DM)  - Urology f/u -     D/C plan from renal stand point: likely tomorrow  : f/u w/ me at [address] in 1 week after d/c. (I will arrange.)    D/W pt, hospitalist- Dr LUCI Meek        IMPRESSION:       Admitted for:  Kidney stone [N20.0]  Nephrolithiasis [N20.0]  ONOFRE (acute kidney injury) ([address] [N17.9]  Left obstructive uropathy. 7 mm stone in the mid left ureter with mild to moderate proximal obstruction. Bilateral nephrolithiasis is again noted. 3.2 cm left renal cyst with peripheral calcification is stable. - S/P LEFT ureteral stent       ONOFRE (on CKD: 2-3A): tory  - BL Scr- lowest recentlly 1.2-1.3 ---> 2.2 on admission -> worsened to 2.4 at peak  -: Etiology of ONOFRE - presumed ATN due to hypovolemia + obstruction   - UA and CT scan reviewed     Associated problems:   - Volume status: euvolemic  : BP:HTN - no need for tight control  - lytes and a/b: stable     Other major problems: Management per primary and other consulting teams. Hospital Problems         Last Modified POA    Nephrolithiasis 2021 Yes        : other supportive care :   - Check daily renal function panel with electrolytes-phosphorus  - Strict monitoring of I/Os, daily weight  - Renal feeds/diet  - Current medications reviewed. - Nephrotoxic medications have been discontinued. - Dose adjusted and appropriate.      - Dose meds for eGFR <15 mL/min/1.73m2 during ONOFRE    - Avoid heavy opioids due to renal failure - may use very low dose dilaudid / fentanyl with close monitoring of CNS and respiratory depression. Please refer to the orders. High Complexity. Multiple complex problems. Discussed with patient and treatment team-    Time spent > 30 (~35) minutes. Thank you for allowing me to participate in this patient's care. Please do not hesitate to contact me anytime. We will follow along with you. Bharat Manzano MD,  [facility] [address] [place] [address] [place]: [phone] or Via [employer]  Fax: [phone]        ========================================================   ========================================================   Subjective:  Patient was seen comfortably sitting up in the chair,   Reported no active complaints,   Renal function better. Past medical, Surgical, Social, Family medical history reviewed by me  MEDICATIONS: reviewed by me. Medications Prior to Admission:  No current facility-administered medications on file prior to encounter. Current Outpatient Medications on File Prior to Encounter   Medication Sig Dispense Refill    potassium citrate (UROCIT-K) 5 MEQ (540 MG) extended release tablet Take 5 mEq by mouth 3 times daily (with meals)      metFORMIN (GLUCOPHAGE-XR) 500 MG extended release tablet TAKE 1 TABLET BY MOUTH EVERY DAY 90 tablet 1    glimepiride (AMARYL) 2 MG tablet TAKE 1 TABLET BY MOUTH EVERY DAY IN THE MORNING 90 tablet 0    losartan (COZAAR) 50 MG tablet TAKE 1 TABLET BY MOUTH EVERY DAY 90 tablet 0    aspirin 81 MG EC tablet Take 81 mg by mouth daily      famotidine (PEPCID) 20 MG tablet Take 20 mg by mouth daily.         azelastine (ASTELIN) 0.1 % nasal spray 1 spray by Nasal route 2 times daily Use in each nostril as directed 1 Bottle 5    hyoscyamine (LEVSIN) 125 MCG tablet Take 1 tablet by mouth every 4 hours as needed for Cramping 50 tablet 3    albuterol sulfate HFA (PROAIR HFA) 108 (90 Base) MCG/ACT inhaler Inhale 2 puffs into the lungs 4 times daily as needed for Wheezing 3 Inhaler 2    glucose blood VI test strips (ONE TOUCH ULTRA TEST) strip Tests once daily 100 each 3    Blood Glucose Monitoring Suppl (ONE TOUCH ULTRA) by Does not apply route.              Current Facility-Administered Medications:     albuterol sulfate  (90 Base) MCG/ACT inhaler 2 puff, 2 puff, Inhalation, 4x Daily PRN, Clive Silverman MD    aspirin chewable tablet 81 mg, 81 mg, Oral, Daily, Clive Silverman MD, 81 mg at 08/25/21 0925    azelastine (ASTELIN) 0.1 % nasal spray 1 spray, 1 spray, Each Nostril, BID PRN, Clive Silverman MD    famotidine (PEPCID) tablet 20 mg, 20 mg, Oral, Daily, Clive Silverman MD, 20 mg at 08/25/21 6817    insulin lispro (1 Unit Dial) 0-6 Units, 0-6 Units, Subcutaneous, TID WC, Clive Silverman MD, 1 Units at 08/25/21 2328    insulin lispro (1 Unit Dial) 0-3 Units, 0-3 Units, Subcutaneous, Nightly, Clive Silverman MD, 2 Units at 08/24/21 2102    glucose (GLUTOSE) 40 % oral gel 15 g, 15 g, Oral, PRN, Clive Silverman MD    dextrose 50 % IV solution, 12.5 g, IntraVENous, PRN, Clive Silverman MD    glucagon (rDNA) injection 1 mg, 1 mg, Intramuscular, PRN, Clive Silverman MD    dextrose 5 % solution, 100 mL/hr, IntraVENous, PRN, Clive Silverman MD    0.9 % sodium chloride infusion, , IntraVENous, Continuous, Clive Silverman MD, Last Rate: 100 mL/hr at 08/25/21 0604, New Bag at 08/25/21 0604    sodium chloride flush 0.9 % injection 10 mL, 10 mL, IntraVENous, 2 times per day, Clive Silverman MD    sodium chloride flush 0.9 % injection 10 mL, 10 mL, IntraVENous, PRN, Clive Silverman MD    0.9 % sodium chloride infusion, 25 mL, IntraVENous, PRN, Clive Silverman MD    magnesium hydroxide (MILK OF MAGNESIA) 400 MG/5ML suspension 30 mL, 30 mL, Oral, Daily PRN, Clive Silverman MD    acetaminophen (TYLENOL) tablet 650 mg, 650 mg, Oral, Q6H PRN, 650 mg at 08/25/21 0607 **OR** acetaminophen (TYLENOL) suppository 650 mg, 650 mg, Rectal, Q6H PRN, Deborah Kay MD    ondansetron [facility]) injection 4 mg, 4 mg, IntraVENous, Q6H PRN, Deborah Kay MD    morphine (PF) injection 2 mg, 2 mg, IntraVENous, Q3H PRN **OR** morphine injection 4 mg, 4 mg, IntraVENous, Q3H PRN, Deborah Kay MD, 4 mg at 08/24/21 0753    heparin (porcine) injection 5,000 Units, 5,000 Units, Subcutaneous, 3 times per day, Deborah Kay MD, 5,000 Units at 08/25/21 0168        REVIEW OF SYSTEMS:  As mentioned in chief complaint and HPI , Subjective          PHYSICAL EXAM:  Recent vital signs and recent I/Os reviewed by me.      Wt Readings from Last 3 Encounters:   08/25/21 256 lb (116.1 kg)   06/30/21 257 lb (116.6 kg)   10/02/20 248 lb (112.5 kg)     BP Readings from Last 3 Encounters:   08/25/21 137/85   08/24/21 (!) 100/58   06/30/21 136/82     Patient Vitals for the past 24 hrs:   BP Temp Temp src Pulse Resp SpO2 Height Weight   08/25/21 0922 -- -- -- -- -- -- 5' 9\" (1.753 m) 256 lb (116.1 kg)   08/25/21 0918 137/85 97.6 °F (36.4 °C) Oral 84 16 96 % -- --   08/25/21 0607 137/84 97.9 °F (36.6 °C) Oral 89 16 95 % -- --   08/24/21 2349 (!) 149/85 98.1 °F (36.7 °C) Oral 96 16 91 % -- --   08/24/21 2100 (!) 148/86 98.5 °F (36.9 °C) Oral 90 16 94 % -- --   08/24/21 1915 (!) 142/86 -- -- 95 16 97 % -- --   08/24/21 1845 (!) 145/85 -- -- 105 16 93 % -- --   08/24/21 1815 (!) 156/67 98.3 °F (36.8 °C) Oral 97 16 96 % -- --   08/24/21 1600 136/80 -- -- 86 23 97 % -- --   08/24/21 1545 113/77 -- -- 90 16 100 % -- --   08/24/21 1530 117/78 98 °F (36.7 °C) Temporal 85 12 99 % -- --   08/24/21 1525 108/73 -- -- 83 12 99 % -- --   08/24/21 1520 113/69 -- -- 86 12 99 % -- --   08/24/21 1515 103/67 98.2 °F (36.8 °C) Temporal 85 20 98 % -- --   08/24/21 1331 (!) 155/91 98 °F (36.7 °C) Temporal 88 16 97 % -- --   08/24/21 1313 (!) 155/95 -- -- 95 16 97 % -- --       Intake/Output Summary (Last 24 hours) at 8/25/2021 0945  Last data filed at 8/25/2021 0609  Gross per 24 hour   Intake 1000 ml   Output 1350 ml   Net -350 ml       Physical Exam  Vitals reviewed. Constitutional:       General: He is not in acute distress. Comments: Obese body habitus   HENT:      Head: Normocephalic and atraumatic. Right Ear: External ear normal.      Left Ear: External ear normal.      Mouth/Throat:      Mouth: Mucous membranes are not dry. Eyes:      General: No scleral icterus. Conjunctiva/sclera: Conjunctivae normal.   Neck:      Thyroid: No thyroid mass. Vascular: No JVD. Trachea: Trachea normal.   Cardiovascular:      Rate and Rhythm: Normal rate and regular rhythm. Pulmonary:      Effort: Pulmonary effort is normal.      Breath sounds: Normal breath sounds. Abdominal:      General: Bowel sounds are normal.      Palpations: Abdomen is soft. Musculoskeletal:         General: No deformity. Cervical back: Neck supple. Skin:     General: Skin is warm and dry. Neurological:      Mental Status: He is alert and oriented to person, place, and time. Psychiatric:         Behavior: Behavior normal.              DATA:  Diagnostic tests reviewed by me for today's visit:   (AS NEEDED FOR MY EVALUATION AND MANAGEMENT). Recent Labs     08/23/21  1438 08/24/21  0556   WBC 12.4* 11.7*   HCT 49.8 43.5    187     Iron Saturation:  No components found for: PERCENTFE  FERRITIN:  No results found for: FERRITIN  IRON:  No results found for: IRON  TIBC:  No results found for: TIBC    Recent Labs     08/23/21  1438 08/24/21  0556 08/25/21  0636    138 137   K 5.0 4.3 4.6   CL 99 100 104   CO2 25 27 22   BUN 41* 39* 39*   CREATININE 2.2* 2.4* 1.7*     Recent Labs     08/23/21  1438 08/24/21  0556 08/25/21  0636   CALCIUM 9.9 8.7 8.5   PHOS  --  4.4 3.5     No results for input(s): PH, PCO2, PO2 in the last 72 hours.     Invalid input(s): [id number], INSPIREDO2    ABG:  No results found for: PH, PCO2, PO2, HCO3, BE, THGB, TCO2, O2SAT  VBG:  No results found for: PHVEN, FWT2ERX, BEVEN, B2GHKYRJ    LDH:  No results found for: LDH  Uric Acid:    Lab Results   Component Value Date    LABURIC 9.1 08/24/2021       PT/INR:  No results found for: PROTIME, INR  Warfarin PT/INR:  No components found for: PTPATWAR, PTINRWAR  PTT:  No results found for: APTT, PTT[APTT}  Last 3 Troponin:    Lab Results   Component Value Date    TROPONINI <0.01 08/23/2021       U/A:    Lab Results   Component Value Date    COLORU YELLOW 08/23/2021    PROTEINU 30 08/23/2021    PHUR 5.0 08/23/2021    WBCUA 2 08/23/2021    RBCUA 3 08/23/2021    YEAST neg 09/25/2020    BACTERIA neg 09/25/2020    CLARITYU CLOUDY 08/23/2021    SPECGRAV 1.022 08/23/2021    LEUKOCYTESUR Negative 08/23/2021    UROBILINOGEN 0.2 08/23/2021    BILIRUBINUR Negative 08/23/2021    BLOODU LARGE 08/23/2021    GLUCOSEU Negative 08/23/2021     Microalbumen/Creatinine ratio:  No components found for: RUCREAT  24 Hour Urine for Protein:  No components found for: RAWUPRO, UHRS3, QCQE42NM, UTV3  24 Hour Urine for Creatinine Clearance:  No components found for: CREAT4, UHRS10, UTV10  Urine Toxicology:  No components found for: IAMMENTA, IBARBIT, IBENZO, ICOCAINE, IMARTHC, IOPIATES, IPHENCYC    HgBA1c:    Lab Results   Component Value Date    LABA1C 6.1 06/08/2021     RPR:  No results found for: RPR  HIV:  No results found for: HIV  TATA:  No results found for: ANATITER, TATA  RF:  No results found for: RF  DSDNA:  No components found for: DNA  AMYLASE:  No results found for: AMYLASE  LIPASE:    Lab Results   Component Value Date    LIPASE 53.0 08/23/2021     Fibrinogen Level:  No components found for: FIB       BELOW MENTIONED RADIOLOGY STUDY RESULTS BY ME (AS NEEDED FOR MY EVALUATION AND MANAGEMENT).      CT ABDOMEN PELVIS WO CONTRAST Additional Contrast? None    Result Date: 8/23/2021  EXAMINATION: CT OF THE ABDOMEN AND PELVIS WITHOUT CONTRAST 8/23/2021 3:42 pm TECHNIQUE: CT of the abdomen and pelvis was performed without the administration of intravenous contrast. Multiplanar reformatted images are provided for review. Dose modulation, iterative reconstruction, and/or weight based adjustment of the mA/kV was utilized to reduce the radiation dose to as low as reasonably achievable. COMPARISON: None. HISTORY: ORDERING SYSTEM PROVIDED HISTORY: Left abdominal pain, acute renal insufficiency/ONOFRE TECHNOLOGIST PROVIDED HISTORY: Reason for exam:->Left abdominal pain, acute renal insufficiency/ONOFRE Additional Contrast?->None Decision Support Exception - unselect if not a suspected or confirmed emergency medical condition->Emergency Medical Condition (MA) Reason for Exam: Left abdominal pain, acute renal insufficiency/ONOFRE Acuity: Unknown Type of Exam: Unknown FINDINGS: Lower Chest: The lung bases are clear. The visualized heart is unremarkable. Genitourinary: The kidneys are symmetrical in size. In the inferior pole of the right kidney there is a nonobstructing 3 mm calculus. In the inferior pole of the left kidney, there are calculi measuring 8 mm in aggregate. In the superior pole of the left kidney there is a 3.2 cm cyst with stable peripheral calcification. Mild to moderate left hydroureteronephrosis has developed, secondary to a obstructing 7 x 5 mm stone at the mid ureter (level of L3-4). Urinary bladder and prostate gland are unremarkable. Organs: There is diffuse fatty infiltration of the liver. No gallbladder stones are seen. The pancreas, spleen and adrenal glands are unremarkable. GI/Bowel: The stomach and small bowel are unremarkable. Appendix is normal in caliber. There is no focal mural thickening of the colon. Pelvis: There is no free fluid or mass. Peritoneum/Retroperitoneum: There is no adenopathy or aneurysm. Bones/Soft Tissues: Severe multilevel spondylosis. At L2-3 there is moderate narrowing of the central canal with posterior disc osteophyte complex.   At L5-S1 there is severe right foraminal narrowing due to osseous hypertrophy and disc bulge. Abdominal wall is unremarkable. Left obstructive uropathy. There is a 7 mm stone in the mid left ureter with mild to moderate proximal obstruction. Bilateral nephrolithiasis is again noted. A 3.2 cm left renal cyst with peripheral calcification is stable. Hepatic steatosis. XR CHEST PORTABLE    Result Date: 8/23/2021  EXAMINATION: ONE XRAY VIEW OF THE CHEST 8/23/2021 2:48 pm COMPARISON: 03/01/2012 HISTORY: ORDERING SYSTEM PROVIDED HISTORY: Abdominal pain TECHNOLOGIST PROVIDED HISTORY: Reason for exam:->Abdominal pain Reason for Exam: Emesis (arrived per self d/t vomiting since yesterday morning at 0600) Acuity: Unknown Type of Exam: Unknown FINDINGS: Heart size and pulmonary vasculature within normal limits. Lungs clear.  Costophrenic angles sharp     No active cardiopulmonary disease

## 2021-08-25 NOTE — PROGRESS NOTES
Urology Progress Note  [facility]    Provider: QUINCY Gandhi CNP  Patient ID:  Admission Date: 2021 Name: Mariajose Middleton Date: 2021 MRN: [id number]   Patient Location: [id number] : 1965  Attending: Adam Phillips MD Date of Service: 2021  PCP: Jane Moody MD     Diagnoses:  1. Kidney stone    2. ONOFRE (acute kidney injury) ([address]    3. Obstructing LEFT mid ureteral stone w hydronephrosis  4. Prostate nodule      Assessment/Plan:  S/p nt cystoscopy with LEFT sided ureteral stent yesterday  Cr is improved to 1.7   Expect future ESWL vs ureteroscopy. PSA currently pending, he will need prostate bx in the future for prostate nodule  Follow up requested, call with any questions    The patient had a chance to ask questions which were answered. he understands the above plan. Subjective:   Silvia Arthur is a 49 y.o. male. He was seen and examined this morning. Today we discussed follow up in office for definitve stone procedure. Objective:   Vitals:  Vitals:    21 0918   BP: 137/85   Pulse: 84   Resp: 16   Temp: 97.6 °F (36.4 °C)   SpO2: 96%       Intake/Output Summary (Last 24 hours) at 2021 1126  Last data filed at 2021 2199  Gross per 24 hour   Intake 1000 ml   Output 1350 ml   Net -350 ml     Physical Exam:  Gen: Alert and oriented x3, no acute distress  CV: Regular rate   Resp: unlabored respirations  Abd: Soft, non-distended, non-tender, no masses  Ext: no peripheral edema noted, moves upper and lower extremities spontaneously  Skin: warmand well perfused, no rashes noted on the face, or arms.      Labs:  Lab Results   Component Value Date    WBC 11.7 (H) 2021    HGB 14.8 2021    HCT 43.5 2021    MCV 98.8 2021     2021     Lab Results   Component Value Date    CREATININE 1.7 (H) 2021    BUN 39 (H) 2021     2021    K 4.6 2021     2021    CO2 22 2021 Samantha Jackson, QUINCY - CNP   8/25/2021

## 2021-08-25 NOTE — PROGRESS NOTES
[facility]    8/25/2021 12:57 PM        Name: Ryan Yousif . Admitted: 8/23/2021  Primary Care Provider: Jasmyn Bryson MD (Tel: [phone])                        Subjective:  . No acute events overnight. Resting well. Pain control. Diet ok. Labs reviewed  Denies any chest pain sob.      Reviewed interval ancillary notes    Current Medications  albuterol sulfate  (90 Base) MCG/ACT inhaler 2 puff, 4x Daily PRN  aspirin chewable tablet 81 mg, Daily  azelastine (ASTELIN) 0.1 % nasal spray 1 spray, BID PRN  famotidine (PEPCID) tablet 20 mg, Daily  insulin lispro (1 Unit Dial) 0-6 Units, TID WC  insulin lispro (1 Unit Dial) 0-3 Units, Nightly  glucose (GLUTOSE) 40 % oral gel 15 g, PRN  dextrose 50 % IV solution, PRN  glucagon (rDNA) injection 1 mg, PRN  dextrose 5 % solution, PRN  0.9 % sodium chloride infusion, Continuous  sodium chloride flush 0.9 % injection 10 mL, 2 times per day  sodium chloride flush 0.9 % injection 10 mL, PRN  0.9 % sodium chloride infusion, PRN  magnesium hydroxide (MILK OF MAGNESIA) 400 MG/5ML suspension 30 mL, Daily PRN  acetaminophen (TYLENOL) tablet 650 mg, Q6H PRN   Or  acetaminophen (TYLENOL) suppository 650 mg, Q6H PRN  ondansetron (ZOFRAN) injection 4 mg, Q6H PRN  morphine (PF) injection 2 mg, Q3H PRN   Or  morphine injection 4 mg, Q3H PRN  heparin (porcine) injection 5,000 Units, 3 times per day        Objective:  BP (!) 143/76   Pulse 87   Temp 98 °F (36.7 °C) (Oral)   Resp 16   Ht 5' 9\" (1.753 m)   Wt 256 lb (116.1 kg)   SpO2 96%   BMI 37.80 kg/m²     Intake/Output Summary (Last 24 hours) at 8/25/2021 1257  Last data filed at 8/25/2021 4683  Gross per 24 hour   Intake 1000 ml   Output 1350 ml   Net -350 ml      Wt Readings from Last 3 Encounters:   08/25/21 256 lb (116.1 kg)   06/30/21 257 lb (116.6 kg) 10/02/20 248 lb (112.5 kg)       General appearance:  Appears comfortable  Eyes: Sclera clear. Pupils equal.  ENT: Moist oral mucosa. Trachea midline, no adenopathy. Cardiovascular: Regular rhythm, normal S1, S2. No murmur. No edema in lower extremities  Respiratory: Not using accessory muscles. Good inspiratory effort. Clear to auscultation bilaterally, no wheeze or crackles. GI: Abdomen soft, no tenderness, not distended, normal bowel sounds  Musculoskeletal: No cyanosis in digits, neck supple  Neurology: CN 2-12 grossly intact. No speech or motor deficits  Psych: Normal affect. Alert and oriented in time, place and person  Skin: Warm, dry, normal turgor    Labs and Tests:  CBC:   Recent Labs     08/23/21  1438 08/24/21  0556   WBC 12.4* 11.7*   HGB 16.9 14.8    187     BMP:    Recent Labs     08/23/21  1438 08/24/21  0556 08/25/21  0636    138 137   K 5.0 4.3 4.6   CL 99 100 104   CO2 25 27 22   BUN 41* 39* 39*   CREATININE 2.2* 2.4* 1.7*   GLUCOSE 220* 149* 193*     Hepatic:   Recent Labs     08/23/21  1438   AST 27   ALT 33   BILITOT 0.8   ALKPHOS 54       Discussed care with family and patient             Spent 30  minutes with patient and family at bedside and on unit reviewing medical records and labs, spent greater than 50% time counseling patient and family on diagnosis and plan   Problem List  Active Problems:    Nephrolithiasis  Resolved Problems:    * No resolved hospital problems. *       Assessment & Plan:   1. Obstructing renal stone  -Patient is status post cystoscopy with right ureteral stent  -Still with significant pain but improving  -Appreciate urology recommendation continue Flomax    Acute kidney injury  -Patient creatinine is improved to 1.7 but still not at baseline  -Continue IV fluid continue to hold losartan  -Plan is to discharge home tomorrow        Diet: ADULT DIET;  Regular  Code:Full Code  DVT PPX lovenox       Sudhir Martínez MD   8/25/2021 12:57 PM

## 2021-08-26 VITALS
OXYGEN SATURATION: 98 % | RESPIRATION RATE: 16 BRPM | BODY MASS INDEX: 37.92 KG/M2 | TEMPERATURE: 97.8 F | WEIGHT: 256 LBS | SYSTOLIC BLOOD PRESSURE: 151 MMHG | DIASTOLIC BLOOD PRESSURE: 86 MMHG | HEART RATE: 84 BPM | HEIGHT: 69 IN

## 2021-08-26 LAB
ALBUMIN SERPL-MCNC: 3.7 G/DL (ref 3.4–5)
ANION GAP SERPL CALCULATED.3IONS-SCNC: 11 MMOL/L (ref 3–16)
BUN BLDV-MCNC: 39 MG/DL (ref 7–20)
CALCIUM SERPL-MCNC: 8.6 MG/DL (ref 8.3–10.6)
CHLORIDE BLD-SCNC: 107 MMOL/L (ref 99–110)
CO2: 23 MMOL/L (ref 21–32)
CREAT SERPL-MCNC: 1.5 MG/DL (ref 0.9–1.3)
GFR AFRICAN AMERICAN: 59
GFR NON-AFRICAN AMERICAN: 49
GLUCOSE BLD-MCNC: 136 MG/DL (ref 70–99)
GLUCOSE BLD-MCNC: 138 MG/DL (ref 70–99)
GLUCOSE BLD-MCNC: 142 MG/DL (ref 70–99)
PERFORMED ON: ABNORMAL
PERFORMED ON: ABNORMAL
PHOSPHORUS: 3.2 MG/DL (ref 2.5–4.9)
POTASSIUM SERPL-SCNC: 4.4 MMOL/L (ref 3.5–5.1)
SODIUM BLD-SCNC: 141 MMOL/L (ref 136–145)
TOTAL CK: 298 U/L (ref 39–308)

## 2021-08-26 PROCEDURE — 2580000003 HC RX 258: Performed by: UROLOGY

## 2021-08-26 PROCEDURE — 80069 RENAL FUNCTION PANEL: CPT

## 2021-08-26 PROCEDURE — 6370000000 HC RX 637 (ALT 250 FOR IP): Performed by: UROLOGY

## 2021-08-26 PROCEDURE — 82550 ASSAY OF CK (CPK): CPT

## 2021-08-26 PROCEDURE — 6360000002 HC RX W HCPCS: Performed by: UROLOGY

## 2021-08-26 PROCEDURE — 36415 COLL VENOUS BLD VENIPUNCTURE: CPT

## 2021-08-26 RX ORDER — AMLODIPINE BESYLATE 5 MG/1
5 TABLET ORAL DAILY
Qty: 30 TABLET | Refills: 0 | Status: SHIPPED | OUTPATIENT
Start: 2021-08-26 | End: 2021-09-22 | Stop reason: SDUPTHER

## 2021-08-26 RX ADMIN — ASPIRIN 81 MG: 81 TABLET, CHEWABLE ORAL at 08:29

## 2021-08-26 RX ADMIN — FAMOTIDINE 20 MG: 20 TABLET ORAL at 08:29

## 2021-08-26 RX ADMIN — HEPARIN SODIUM 5000 UNITS: 5000 INJECTION INTRAVENOUS; SUBCUTANEOUS at 13:09

## 2021-08-26 RX ADMIN — HEPARIN SODIUM 5000 UNITS: 5000 INJECTION INTRAVENOUS; SUBCUTANEOUS at 06:32

## 2021-08-26 RX ADMIN — SODIUM CHLORIDE: 9 INJECTION, SOLUTION INTRAVENOUS at 05:25

## 2021-08-26 ASSESSMENT — PAIN SCALES - GENERAL
PAINLEVEL_OUTOF10: 0
PAINLEVEL_OUTOF10: 0

## 2021-08-26 NOTE — DISCHARGE SUMMARY
[address] [place] [address] DISCHARGE SUMMARY    Patient Demographics    Patient. Olga Grider  Date of Birth. 1965  MRN. [id number]     Primary care provider. Lubna Cedeno MD  (Tel: [phone])    Admit date: 8/23/2021    Discharge date (blank if same as Note Date): 8/26/2021  Note Date: 8/26/2021     Reason for Hospitalization. Chief Complaint   Patient presents with    Emesis     arrived per self d/t vomiting since yesterday morning at [facility]. Obstructing renal stone with left hydronephrosis  -Patient underwent cystoscopy with left-sided ureteral stent placement  -Along with acute renal failure  -Patient postoperatively well renal function improved. -Discharge medically stable    Acute kidney injury  -Creatinine of 2.5. Down to 1.5 on discharge with normal creatinine at baseline  -Nephrology was consulted losartan held on discharge  -Norvasc added for blood pressure  Patient follow-up for further evaluation    Consults. IP CONSULT TO UROLOGY  IP CONSULT TO NEPHROLOGY  IP CONSULT TO SOCIAL WORK    Physical examination on discharge day. BP (!) 151/86   Pulse 84   Temp 97.8 °F (36.6 °C) (Oral)   Resp 16   Ht 5' 9\" (1.753 m)   Wt 256 lb (116.1 kg)   SpO2 98%   BMI 37.80 kg/m²   General appearance. Alert. Looks comfortable. HEENT. Sclera clear. Moist mucus membranes. Cardiovascular. Regular rate and rhythm, normal S1, S2. No murmur. Respiratory. Not using accessory muscles. Clear to auscultation bilaterally, no wheeze. Gastrointestinal. Abdomen soft, non-tender, not distended, normal bowel sounds  Neurology. Facial symmetry. No speech deficits. Moving all extremities equally. Extremities. No edema in lower extremities. Skin. Warm, dry, normal turgor    Condition at time of discharge stable     Medication instructions provided to patient at discharge. Medication List      START taking these medications    amLODIPine 5 MG tablet  Commonly known as: NORVASC  Take 1 tablet by mouth daily     oxyCODONE-acetaminophen 5-325 MG per tablet  Commonly known as: Percocet  Take 1 tablet by mouth every 8 hours as needed for Pain for up to 7 days. Intended supply: 3 days. Take lowest dose possible to manage pain        CONTINUE taking these medications    albuterol sulfate  (90 Base) MCG/ACT inhaler  Commonly known as: ProAir HFA  Inhale 2 puffs into the lungs 4 times daily as needed for Wheezing     aspirin 81 MG EC tablet     azelastine 0.1 % nasal spray  Commonly known as: ASTELIN  1 spray by Nasal route 2 times daily Use in each nostril as directed     blood glucose test strips strip  Commonly known as: ONE TOUCH ULTRA TEST  Tests once daily     famotidine 20 MG tablet  Commonly known as: PEPCID     glimepiride 2 MG tablet  Commonly known as: AMARYL  TAKE 1 TABLET BY MOUTH EVERY DAY IN THE MORNING     hyoscyamine 125 MCG tablet  Commonly known as: Levsin  Take 1 tablet by mouth every 4 hours as needed for Cramping     ONE TOUCH ULTRA     potassium citrate 5 MEQ (540 MG) extended release tablet  Commonly known as: UROCIT-K        STOP taking these medications    losartan 50 MG tablet  Commonly known as: COZAAR     metFORMIN 500 MG extended release tablet  Commonly known as: GLUCOPHAGE-XR     naproxen 500 MG tablet  Commonly known as: Naprosyn     VITAMIN C CR 1500 MG Tbcr           Where to Get Your Medications      These medications were sent to [address], [place], [address] [address]    Phone: [phone]   · amLODIPine 5 MG tablet     You can get these medications from any pharmacy    Bring a paper prescription for each of these medications  · oxyCODONE-acetaminophen 5-325 MG per tablet         Discharge recommendations given to patient.   Follow Up. pcp in 1 week Disposition. home  Activity. activity as tolerated  Diet: No diet orders on file      Spent 45  minutes in discharge process.     Signed:  Queen Kellie MD     8/26/2021 5:02 PM

## 2021-08-26 NOTE — PROGRESS NOTES
PM assessment complete. Pt resting in chair with no c/o pain or discomfort made. Pt reports urine has become yellow, still cloudy. Pt remains independent in room.  Fall precautions in place, hourly rounding, call light and belongings in reach, bed in lowest position, wheels locked in place, side rails up x 2, walkways free of clutter

## 2021-08-26 NOTE — PLAN OF CARE
Problem: Urinary Elimination:  Goal: Ability to achieve a balanced intake and output will improve  Description: Ability to achieve a balanced intake and output will improve  8/26/2021 0458 by Peyman Salazar RN  Outcome: Ongoing  8/26/2021 0120 by Lang Taylor RN  Outcome: Ongoing     Problem: Pain:  Goal: Pain level will decrease  Description: Pain level will decrease  8/26/2021 0458 by Peyman Salazar RN  Outcome: Ongoing  8/26/2021 0120 by Lang Taylor RN  Outcome: Ongoing  Goal: Control of acute pain  Description: Control of acute pain  8/26/2021 0458 by Peyman Salazar RN  Outcome: Ongoing  8/26/2021 0120 by Lang Taylor RN  Outcome: Ongoing  Goal: Control of chronic pain  Description: Control of chronic pain  8/26/2021 0458 by Peyman Salazar RN  Outcome: Ongoing  8/26/2021 0120 by Lang Taylor RN  Outcome: Ongoing

## 2021-08-26 NOTE — PROGRESS NOTES
Gave d/c instructions with list of active meds and when they are next due. Reviewed discharge instructions for ONOFRE, kidney stone and uteral stent post op at bedside and provided printed copy of same. Pt verbalized understanding of all instructions. Denied additional questions. To home as passenger in private vehicle.

## 2021-08-26 NOTE — PROGRESS NOTES
MD Tor Smith MD Ronalee Han, MD               Office: [phone]                      Fax: [phone]             [facility]                   NEPHROLOGY INPATIENT PROGRESS NOTE:     PATIENT NAME: Julianne Canada  : 1965  MRN: [id number]       RECOMMENDATIONS:   - checked CK w/ large blood, RBC 3 only slightly elevated   - f/u trend as was higher, add on CK in am labs' today     -stop IVF, allow PO hydration - on d/c too   - hold home: K, Losartan, Naproxen, Metformin (use ISS for DM) - reviewed d/c med rec w/ Dr Davis Live too ,    - add norvasc for BP conrol   - Urology f/u as outpt     D/C plan from renal stand point: likely tomorrow  : f/u w/ me at [address] in 1 week after d/c. (I will arrange.)    D/W pt, hospitalist- Dr LUCI Meek        IMPRESSION:       Admitted for:  Kidney stone [N20.0]  Nephrolithiasis [N20.0]  ONOFRE (acute kidney injury) ([facility] [address] [N17.9]  Left obstructive uropathy. 7 mm stone in the mid left ureter with mild to moderate proximal obstruction. Bilateral nephrolithiasis is again noted. 3.2 cm left renal cyst with peripheral calcification is stable. - S/P LEFT ureteral stent (2021- by Dr Yvan Heck)   - would need outpt prostate bx for prostate nodule       ONOFRE (on CKD: 2-3A): tory  - BL Scr- lowest recentlly 1.2-1.3 ---> 2.2 on admission -> worsened to 2.4 at peak  -: Etiology of ONOFRE - presumed ATN due to hypovolemia + obstruction   - UA and CT scan reviewed     Associated problems:   - Volume status: euvolemic  : BP:HTN - no need for tight control  - lytes and a/b: stable     Other major problems: Management per primary and other consulting teams. Hospital Problems         Last Modified POA    Nephrolithiasis 2021 Yes           Please refer to the orders. High Complexity. Multiple complex problems. Discussed with patient and treatment team-    Time spent > 30 (~35) minutes.    Thank you for allowing me to participate in this patient's care. Please do not hesitate to contact me anytime. We will follow along with you. Uma Hsieh MD,  [facility] [address] [place] [address] [place]: [phone] or Via [employer]  Fax: [phone]        ========================================================   ========================================================   Subjective:  Patient was seen comfortably sitting up in the bed,   Reported no active complaints,   Renal function better . BP slightly higher     Past medical, Surgical, Social, Family medical history reviewed by me  MEDICATIONS: reviewed by me. Medications Prior to Admission:  No current facility-administered medications on file prior to encounter. Current Outpatient Medications on File Prior to Encounter   Medication Sig Dispense Refill    potassium citrate (UROCIT-K) 5 MEQ (540 MG) extended release tablet Take 5 mEq by mouth 3 times daily (with meals)      metFORMIN (GLUCOPHAGE-XR) 500 MG extended release tablet TAKE 1 TABLET BY MOUTH EVERY DAY 90 tablet 1    glimepiride (AMARYL) 2 MG tablet TAKE 1 TABLET BY MOUTH EVERY DAY IN THE MORNING 90 tablet 0    losartan (COZAAR) 50 MG tablet TAKE 1 TABLET BY MOUTH EVERY DAY 90 tablet 0    aspirin 81 MG EC tablet Take 81 mg by mouth daily      famotidine (PEPCID) 20 MG tablet Take 20 mg by mouth daily.  azelastine (ASTELIN) 0.1 % nasal spray 1 spray by Nasal route 2 times daily Use in each nostril as directed 1 Bottle 5    hyoscyamine (LEVSIN) 125 MCG tablet Take 1 tablet by mouth every 4 hours as needed for Cramping 50 tablet 3    albuterol sulfate HFA (PROAIR HFA) 108 (90 Base) MCG/ACT inhaler Inhale 2 puffs into the lungs 4 times daily as needed for Wheezing 3 Inhaler 2    glucose blood VI test strips (ONE TOUCH ULTRA TEST) strip Tests once daily 100 each 3    Blood Glucose Monitoring Suppl (ONE TOUCH ULTRA) by Does not apply route.              Current Facility-Administered Medications:     albuterol sulfate  (90 Base) MCG/ACT inhaler 2 puff, 2 puff, Inhalation, 4x Daily PRN, Clive Silverman MD    aspirin chewable tablet 81 mg, 81 mg, Oral, Daily, Clive Silverman MD, 81 mg at 08/26/21 0829    azelastine (ASTELIN) 0.1 % nasal spray 1 spray, 1 spray, Each Nostril, BID PRN, Clive Silverman MD    famotidine (PEPCID) tablet 20 mg, 20 mg, Oral, Daily, Clive Silverman MD, 20 mg at 08/26/21 1683    insulin lispro (1 Unit Dial) 0-6 Units, 0-6 Units, Subcutaneous, TID WC, Clive Silverman MD, 2 Units at 08/25/21 1641    insulin lispro (1 Unit Dial) 0-3 Units, 0-3 Units, Subcutaneous, Nightly, Clive Silverman MD, 2 Units at 08/25/21 2037    glucose (GLUTOSE) 40 % oral gel 15 g, 15 g, Oral, PRN, Clive Silverman MD    dextrose 50 % IV solution, 12.5 g, IntraVENous, PRN, Clive Silverman MD    glucagon (rDNA) injection 1 mg, 1 mg, IntraMUSCular, PRN, Clive Silverman MD    dextrose 5 % solution, 100 mL/hr, IntraVENous, PRN, Clive Silverman MD    0.9 % sodium chloride infusion, , IntraVENous, Continuous, Clive Silverman MD, Last Rate: 100 mL/hr at 08/26/21 0525, New Bag at 08/26/21 0525    sodium chloride flush 0.9 % injection 10 mL, 10 mL, IntraVENous, 2 times per day, Clive Silverman MD    sodium chloride flush 0.9 % injection 10 mL, 10 mL, IntraVENous, PRN, Clive Silverman MD    0.9 % sodium chloride infusion, 25 mL, IntraVENous, PRN, Clive Silverman MD    magnesium hydroxide (MILK OF MAGNESIA) 400 MG/5ML suspension 30 mL, 30 mL, Oral, Daily PRN, Clive Silverman MD    acetaminophen (TYLENOL) tablet 650 mg, 650 mg, Oral, Q6H PRN, 650 mg at 08/25/21 0607 **OR** acetaminophen (TYLENOL) suppository 650 mg, 650 mg, Rectal, Q6H PRN, Clive Silverman MD    ondansetron [facility]) injection 4 mg, 4 mg, IntraVENous, Q6H PRN, Clive Silverman MD    morphine (PF) injection 2 mg, 2 mg, IntraVENous, Q3H PRN **OR** morphine injection 4 mg, 4 mg, IntraVENous, Q3H PRN, Marianna Justice MD, 4 mg at 08/25/21 1352    heparin (porcine) injection 5,000 Units, 5,000 Units, Subcutaneous, 3 times per day, Marianna Justice MD, 5,000 Units at 08/26/21 8268        REVIEW OF SYSTEMS:  As mentioned in chief complaint and HPI , Subjective          PHYSICAL EXAM:  Recent vital signs and recent I/Os reviewed by me. Wt Readings from Last 3 Encounters:   08/25/21 256 lb (116.1 kg)   06/30/21 257 lb (116.6 kg)   10/02/20 248 lb (112.5 kg)     BP Readings from Last 3 Encounters:   08/26/21 135/78   08/24/21 (!) 100/58   06/30/21 136/82     Patient Vitals for the past 24 hrs:   BP Temp Temp src Pulse Resp SpO2   08/26/21 0400 135/78 98.5 °F (36.9 °C) Oral 75 16 99 %   08/26/21 0000 130/76 98.5 °F (36.9 °C) Oral 78 18 96 %   08/25/21 2030 (!) 153/83 97.7 °F (36.5 °C) Oral 98 16 98 %   08/25/21 1208 (!) 143/76 98 °F (36.7 °C) Oral 87 16 --       Intake/Output Summary (Last 24 hours) at 8/26/2021 0925  Last data filed at 8/25/2021 2042  Gross per 24 hour   Intake 360 ml   Output 700 ml   Net -340 ml       Physical Exam  Vitals reviewed. Constitutional:       General: He is not in acute distress. Comments: Obese body habitus   HENT:      Head: Normocephalic and atraumatic. Right Ear: External ear normal.      Left Ear: External ear normal.      Mouth/Throat:      Mouth: Mucous membranes are not dry. Eyes:      General: No scleral icterus. Conjunctiva/sclera: Conjunctivae normal.   Neck:      Thyroid: No thyroid mass. Vascular: No JVD. Trachea: Trachea normal.   Cardiovascular:      Rate and Rhythm: Normal rate and regular rhythm. Pulmonary:      Effort: Pulmonary effort is normal.      Breath sounds: Normal breath sounds. Abdominal:      General: Bowel sounds are normal.      Palpations: Abdomen is soft. Musculoskeletal:         General: No deformity.       Cervical back: Neck supple. Skin:     General: Skin is warm and dry. Neurological:      Mental Status: He is alert and oriented to person, place, and time. Psychiatric:         Behavior: Behavior normal.              DATA:  Diagnostic tests reviewed by me for today's visit:   (AS NEEDED FOR MY EVALUATION AND MANAGEMENT). Recent Labs     08/23/21  1438 08/24/21  0556   WBC 12.4* 11.7*   HCT 49.8 43.5    187     Iron Saturation:  No components found for: PERCENTFE  FERRITIN:  No results found for: FERRITIN  IRON:  No results found for: IRON  TIBC:  No results found for: TIBC    Recent Labs     08/23/21  1438 08/24/21  0556 08/25/21  0636 08/26/21  0525    138 137 141   K 5.0 4.3 4.6 4.4   CL 99 100 104 107   CO2 25 27 22 23   BUN 41* 39* 39* 39*   CREATININE 2.2* 2.4* 1.7* 1.5*     Recent Labs     08/23/21  1438 08/24/21  0556 08/25/21  0636 08/26/21  0525   CALCIUM 9.9 8.7 8.5 8.6   PHOS  --  4.4 3.5 3.2     No results for input(s): PH, PCO2, PO2 in the last 72 hours.     Invalid input(s): Sean Lebron    ABG:  No results found for: PH, PCO2, PO2, HCO3, BE, THGB, TCO2, O2SAT  VBG:  No results found for: PHVEN, UWJ1OTQ, BEVEN, T1UEWMFF    LDH:  No results found for: LDH  Uric Acid:    Lab Results   Component Value Date    LABURIC 9.1 08/24/2021       PT/INR:  No results found for: PROTIME, INR  Warfarin PT/INR:  No components found for: PTPATWAR, PTINRWAR  PTT:  No results found for: APTT, PTT[APTT}  Last 3 Troponin:    Lab Results   Component Value Date    TROPONINI <0.01 08/23/2021       U/A:    Lab Results   Component Value Date    COLORU YELLOW 08/23/2021    PROTEINU 30 08/23/2021    PHUR 5.0 08/23/2021    WBCUA 2 08/23/2021    RBCUA 3 08/23/2021    YEAST neg 09/25/2020    BACTERIA neg 09/25/2020    CLARITYU CLOUDY 08/23/2021    SPECGRAV 1.022 08/23/2021    LEUKOCYTESUR Negative 08/23/2021    UROBILINOGEN 0.2 08/23/2021    BILIRUBINUR Negative 08/23/2021    BLOODU LARGE 08/23/2021    GLUCOSEU Negative 08/23/2021     Microalbumen/Creatinine ratio:  No components found for: RUCREAT  24 Hour Urine for Protein:  No components found for: RAWUPRO, UHRS3, XLJV15DO, UTV3  24 Hour Urine for Creatinine Clearance:  No components found for: CREAT4, UHRS10, UTV10  Urine Toxicology:  No components found for: Sherice Cornelius, IBENZO, ICOCAINE, IMARTHC, IOPIATES, IPHENCYC    HgBA1c:    Lab Results   Component Value Date    LABA1C 6.1 06/08/2021     RPR:  No results found for: RPR  HIV:  No results found for: HIV  TATA:  No results found for: ANATITER, TATA  RF:  No results found for: RF  DSDNA:  No components found for: DNA  AMYLASE:  No results found for: AMYLASE  LIPASE:    Lab Results   Component Value Date    LIPASE 53.0 08/23/2021     Fibrinogen Level:  No components found for: FIB       BELOW MENTIONED RADIOLOGY STUDY RESULTS BY ME (AS NEEDED FOR MY EVALUATION AND MANAGEMENT). CT ABDOMEN PELVIS WO CONTRAST Additional Contrast? None    Result Date: 8/23/2021  EXAMINATION: CT OF THE ABDOMEN AND PELVIS WITHOUT CONTRAST 8/23/2021 3:42 pm TECHNIQUE: CT of the abdomen and pelvis was performed without the administration of intravenous contrast. Multiplanar reformatted images are provided for review. Dose modulation, iterative reconstruction, and/or weight based adjustment of the mA/kV was utilized to reduce the radiation dose to as low as reasonably achievable. COMPARISON: None. HISTORY: ORDERING SYSTEM PROVIDED HISTORY: Left abdominal pain, acute renal insufficiency/ONOFRE TECHNOLOGIST PROVIDED HISTORY: Reason for exam:->Left abdominal pain, acute renal insufficiency/ONOFRE Additional Contrast?->None Decision Support Exception - unselect if not a suspected or confirmed emergency medical condition->Emergency Medical Condition ([place]) Reason for Exam: Left abdominal pain, acute renal insufficiency/ONOFRE Acuity: Unknown Type of Exam: Unknown FINDINGS: Lower Chest: The lung bases are clear. The visualized heart is unremarkable. Genitourinary: The kidneys are symmetrical in size. In the inferior pole of the right kidney there is a nonobstructing 3 mm calculus. In the inferior pole of the left kidney, there are calculi measuring 8 mm in aggregate. In the superior pole of the left kidney there is a 3.2 cm cyst with stable peripheral calcification. Mild to moderate left hydroureteronephrosis has developed, secondary to a obstructing 7 x 5 mm stone at the mid ureter (level of L3-4). Urinary bladder and prostate gland are unremarkable. Organs: There is diffuse fatty infiltration of the liver. No gallbladder stones are seen. The pancreas, spleen and adrenal glands are unremarkable. GI/Bowel: The stomach and small bowel are unremarkable. Appendix is normal in caliber. There is no focal mural thickening of the colon. Pelvis: There is no free fluid or mass. Peritoneum/Retroperitoneum: There is no adenopathy or aneurysm. Bones/Soft Tissues: Severe multilevel spondylosis. At L2-3 there is moderate narrowing of the central canal with posterior disc osteophyte complex. At L5-S1 there is severe right foraminal narrowing due to osseous hypertrophy and disc bulge. Abdominal wall is unremarkable. Left obstructive uropathy. There is a 7 mm stone in the mid left ureter with mild to moderate proximal obstruction. Bilateral nephrolithiasis is again noted. A 3.2 cm left renal cyst with peripheral calcification is stable. Hepatic steatosis. XR CHEST PORTABLE    Result Date: 8/23/2021  EXAMINATION: ONE XRAY VIEW OF THE CHEST 8/23/2021 2:48 pm COMPARISON: 03/01/2012 HISTORY: ORDERING SYSTEM PROVIDED HISTORY: Abdominal pain TECHNOLOGIST PROVIDED HISTORY: Reason for exam:->Abdominal pain Reason for Exam: Emesis (arrived per self d/t vomiting since yesterday morning at 0600) Acuity: Unknown Type of Exam: Unknown FINDINGS: Heart size and pulmonary vasculature within normal limits. Lungs clear.  Costophrenic angles sharp     No active cardiopulmonary disease

## 2021-08-26 NOTE — PROGRESS NOTES
Urology Progress Note  [facility]    Provider: QUINCY Dunn CNP  Patient ID:  Admission Date: 2021 Name: Hattie Ordonez Date: 2021 MRN: [id number]   Patient Location: [id number] : 1965  Attending: Ortega Arguello MD Date of Service: 2021  PCP: Ariel Cobos MD     Diagnoses:  1. Kidney stone    2. ONOFRE (acute kidney injury) ([address]    3. Obstructing LEFT mid ureteral stone w hydronephrosis  4. Prostate nodule      Assessment/Plan:  S/p nt cystoscopy with LEFT sided ureteral stent yesterday  Cr is improved to 1.5 from 1.7  Expect future ESWL vs ureteroscopy- skin to stone 18cm, 508HU  PSA currently pending, he will need prostate bx in the future for prostate nodule  Follow up requested, OK to d/c per  when medically clear, call with any questions, signing off    The patient had a chance to ask questions which were answered. he understands the above plan. Subjective:   Jomar Villarreal is a 49 y.o. male. He was seen and examined this morning. Today we discussed follow up in office for definitve stone procedure. Objective:   Vitals:  Vitals:    21 0829   BP: (!) 151/86   Pulse: 84   Resp: 16   Temp: 97.8 °F (36.6 °C)   SpO2: 98%       Intake/Output Summary (Last 24 hours) at 2021 1038  Last data filed at 2021  Gross per 24 hour   Intake 360 ml   Output 700 ml   Net -340 ml     Physical Exam:  Gen: Alert and oriented x3, no acute distress  CV: Regular rate   Resp: unlabored respirations  Abd: Soft, non-distended, non-tender, no masses  Ext: no peripheral edema noted, moves upper and lower extremities spontaneously  Skin: warmand well perfused, no rashes noted on the face, or arms.      Labs:  Lab Results   Component Value Date    WBC 11.7 (H) 2021    HGB 14.8 2021    HCT 43.5 2021    MCV 98.8 2021     2021     Lab Results   Component Value Date    CREATININE 1.5 (H) 2021    BUN 39 (H) 2021  08/26/2021    K 4.4 08/26/2021     08/26/2021    CO2 23 08/26/2021       Arielle Yi, APRN - CNP   8/26/2021

## 2021-08-26 NOTE — PLAN OF CARE
Problem: Urinary Elimination:  Goal: Ability to achieve a balanced intake and output will improve  Description: Ability to achieve a balanced intake and output will improve  Outcome: Ongoing     Problem: Pain:  Goal: Pain level will decrease  Description: Pain level will decrease  Outcome: Ongoing  Pt has PRN pain medication available upon request. Pt aware to let nursing know when pain medication is needed.     Goal: Control of acute pain  Description: Control of acute pain  8/26/2021 0120 by Gabriel Marquez RN  Outcome: Ongoing  8/25/2021 1230 by Quiana Clay RN  Outcome: Ongoing  Goal: Control of chronic pain  Description: Control of chronic pain  Outcome: Ongoing

## 2021-08-26 NOTE — PROGRESS NOTES
Pt resting in bed. States pain only when urinating. Denies need for pain medication. Pt is axox4 and able to answer questions and follow commands throughout assessment. The care plan and education has been reviewed and mutually agreed upon with the patient. Tolerating diet well. Will continue to monitor.

## 2021-08-27 ENCOUNTER — CARE COORDINATION (OUTPATIENT)
Dept: CASE MANAGEMENT | Age: 56
End: 2021-08-27

## 2021-08-27 DIAGNOSIS — N20.0 NEPHROLITHIASIS: Primary | ICD-10-CM

## 2021-08-27 NOTE — CARE COORDINATION
Umm [facility] Transitions Initial Follow Up Call    Call within 2 business days of discharge: Yes    Patient: Joel Hassan Patient : 1965   MRN: [id number]  Reason for Admission:   Discharge Date: 21 RARS: Readmission Risk Score: 14      Last Discharge [facility]       Complaint Diagnosis Description Type Department Provider    21 Emesis Kidney stone . .. ED to Hosp-Admission (Discharged) (ADMITTED) Tyrel Coombs MD; Willie Sanchez. .. Non-face-to-face services provided:  Obtained and reviewed discharge summary and/or continuity of care documents  [id number] completed     Transitions of Care Initial Call    Was this an external facility discharge? No Discharge Facility:     Challenges to be reviewed by the provider   Additional needs identified to be addressed with provider: No  none             Method of communication with provider : none      Advance Care Planning:   Does patient have an Advance Directive: not on file. Was this a readmission? No  Patient stated reason for admission: vomiting  Patients top risk factors for readmission: medical condition-kidney stone, s/p cystoscopy and stent placement    Care Transition Nurse (CTN) contacted the patient by telephone to perform post hospital discharge assessment. Verified name and  with patient as identifiers. Provided introduction to self, and explanation of the CTN role. CTN reviewed discharge instructions, medical action plan and red flags with patient who verbalized understanding. Patient given an opportunity to ask questions and does not have any further questions or concerns at this time. Were discharge instructions available to patient? Yes. Reviewed appropriate site of care based on symptoms and resources available to patient including: When to call 911. The patient agrees to contact the PCP office for questions related to their healthcare.      Medication reconciliation was performed with patient, who verbalizes understanding of administration of home medications. Advised obtaining a 90-day supply of all daily and as-needed medications. Reviewed and educated patient on any new and changed medications related to discharge diagnosis. Was patient discharged with a pulse oximeter? No Discussed and confirmed pulse oximeter discharge instructions and when to notify provider or seek emergency care. Pt states doing well, no issues or concerns. Having some soreness, taking percocet as directed. F/U with  [address] [place] [facility] is next Thursday, 9/2/21,  for stone removal. Agreed to more CTC f/u calls    CTN provided contact information. Plan for follow-up call in 5-7 days based on severity of symptoms and risk factors.   Plan for next call: self management-kidney stone, post procedure, f/u appt    Care Transitions 24 Hour Call    Do you have any ongoing symptoms?: Yes  Patient-reported symptoms: Pain (Comment: post procedure and stent)  Interventions for patient-reported symptoms: Other (Comment: expected)  Do you have a copy of your discharge instructions?: Yes  Do you have all of your prescriptions and are they filled?: Yes  Have you been contacted by a [address]?: No  Have you scheduled your follow up appointment?: Yes  How are you going to get to your appointment?: Car - family or friend to transport  Were you discharged with any Home Care or Post Acute Services: No  Do you feel like you have everything you need to keep you well at home?: Yes  Care Transitions Interventions  No Identified Needs         Follow Up  Future Appointments   Date Time Provider Elizabeth Rosenbaum   1/3/2022  9:00 AM MD Kiran Lanza RN

## 2021-08-30 NOTE — PROGRESS NOTES
Name_______________________________________Printed:____________________  Date and time of surgery__9/2/21__1615__[facility]__________________Arrival Time:___1445_____________   1. The instructions given regarding when and if a patient needs to stop oral intake prior to surgery varies. Follow the specific instructions you were given                  _X__Nothing to eat or to drink after Midnight the night before.                   ____Carbo loading or ERAS instructions will be given to select patients-if you have been given those instructions -please do the following                           The evening before your surgery after dinner before midnight drink 40 ounces of gatorade. If you are diabetic use sugar free. The morning of surgery drink 40 ounces of water. This needs to be finished 3 hours prior to your surgery start time. 2. Take the following pills with a small sip of water on the morning of surgery__AMLODIPINE, AND IF NEEDED INHALER, PEPCID, &NASAL SPRAY_____                  Do not take blood pressure medications ending in pril or sartan the robert prior to surgery or the morning of surgery_   3. Aspirin, Ibuprofen, Advil, Naproxen, Vitamin E and other Anti-inflammatory products and supplements should be stopped for 5 -7days before surgery or as directed by your physician. 4. Check with your Doctor regarding stopping Plavix, Coumadin,Eliquis, Lovenox,Effient,Pradaxa,Xarelto, Fragmin or other blood thinners and follow their instructions. 5. Do not smoke, and do not drink any alcoholic beverages 24 hours prior to surgery. This includes NA Beer. Refrain from the usage of any recreational drugs. 6. You may brush your teeth and gargle the morning of surgery. DO NOT SWALLOW WATER   7. You MUST make arrangements for a responsible adult to stay on site while you are here and take you home after your surgery. You will not be allowed to leave alone or drive yourself home.   It is strongly suggested someone stay with you the first 24 hrs. Your surgery will be cancelled if you do not have a ride home. 8. A parent/legal guardian must accompany a child scheduled for surgery and plan to stay at the hospital until the child is discharged. Please do not bring other children with you. 9. Please wear simple, loose fitting clothing to the hospital.  Lis Mcadams not bring valuables (money, credit cards, checkbooks, etc.) Do not wear any makeup (including no eye makeup) or nail polish on your fingers or toes. 10. DO NOT wear any jewelry or piercings on day of surgery. All body piercing jewelry must be removed. 11. If you have ___dentures, they will be removed before going to the OR; we will provide you a container. If you wear ___contact lenses or ___glasses, they will be removed; please bring a case for them. 12. Please see your family doctor/pediatrician for a history & physical and/or concerning medications. Bring any test results/reports from your physician's office. PCP__________________Phone___________H&P Appt. Date________             13 If you  have a Living Will and Durable Power of  for Healthcare, please bring in a copy. 15. Notify your Surgeon if you develop any illness between now and surgery  time, cough, cold, fever, sore throat, nausea, vomiting, etc.  Please notify your surgeon if you experience dizziness, shortness of breath or blurred vision between now & the time of your surgery             15. DO NOT shave your operative site 96 hours prior to surgery. For face & neck surgery, men may use an electric razor 48 hours prior to surgery. 16. Shower the night before or morning of surgery using an antibacterial soap or as you have been instructed. 17. To provide excellent care visitors will be limited to one in the room at any given time. 18.  Please bring picture ID and insurance card.              19.  Visit our web site for additional information:  [email or web address]              20.During flu season no children under the age of 15 are permitted in the hospital for the safety of all patients. 21. If you take a long acting insulin in the evening only  take half of your usual  dose the night  before your procedure              22. If you use a c-pap please bring DOS if staying overnight,             23.For your convenience [id number] [place] [address] has a pharmacy on site to fill your prescriptions. 24. If you use oxygen and have a portable tank please bring it  with you the DOS             25. Bring a complete list of all your medications with name and dose include any supplements. 26. Other__________________________________________   *Please call pre admission testing if you any further questions   [facility]   [address]    [phone]  [phone]   [facility] [address]   [phone]           COVID TESTING    _X__ Vaccinated-patient instructed to bring card    ___ Covid test to be done 3-5 days prior to scheduled surgery if not vaccinated-patient aware they are REQUIRED to bring a copy of the negative result DOS-if they receive a positive result to notify their surgeon         If known - indicate where patient is getting covid test done ___________________________________________________________    ___ Rapid - DOS    ___ Other___________________________________      L[employer]ell [employer] POLICY(subject to change)    There is a one visitor policy at [facility] for all surgeries and endoscopies. Whether the visitor can stay or will be asked to wait in the car will depend on the current policy and if social distancing can be maintained. The policy is subject to change at any time. Please make sure the visitor has a cell phone that is on,charged and able to accept calls, as this may be the way that the staff communicates with them. Pain management is NO VISITOR policyThe patients ride is expected to remain in the car with a cell phone for communication. If the ride is leaving the hospital grounds please make sure they are back in time for pickup. Have the patient inform the staff on arrival what their rides plans are while the patient is in the facility. At the MAIN there is one visitor allowed. Please note that the visitor policy is subject to change. All above information reviewed with patient in person or by phone. Patient verbalizes understanding. All questions and concerns addressed.                                                                                                  Patient/Rep____________________                                                                                                                                    PRE OP INSTRUCTIONS

## 2021-09-02 ENCOUNTER — ANESTHESIA EVENT (OUTPATIENT)
Dept: OPERATING ROOM | Age: 56
End: 2021-09-02
Payer: COMMERCIAL

## 2021-09-02 ENCOUNTER — HOSPITAL ENCOUNTER (OUTPATIENT)
Age: 56
Setting detail: OUTPATIENT SURGERY
Discharge: HOME | End: 2021-09-02
Attending: UROLOGY | Admitting: UROLOGY
Payer: COMMERCIAL

## 2021-09-02 ENCOUNTER — APPOINTMENT (OUTPATIENT)
Dept: GENERAL RADIOLOGY | Age: 56
End: 2021-09-02
Attending: UROLOGY
Payer: COMMERCIAL

## 2021-09-02 ENCOUNTER — ANESTHESIA (OUTPATIENT)
Dept: OPERATING ROOM | Age: 56
End: 2021-09-02
Payer: COMMERCIAL

## 2021-09-02 VITALS
DIASTOLIC BLOOD PRESSURE: 89 MMHG | SYSTOLIC BLOOD PRESSURE: 151 MMHG | HEIGHT: 69 IN | BODY MASS INDEX: 39.69 KG/M2 | RESPIRATION RATE: 14 BRPM | OXYGEN SATURATION: 97 % | TEMPERATURE: 97.3 F | WEIGHT: 268 LBS | HEART RATE: 90 BPM

## 2021-09-02 VITALS
RESPIRATION RATE: 10 BRPM | SYSTOLIC BLOOD PRESSURE: 113 MMHG | TEMPERATURE: 95.9 F | DIASTOLIC BLOOD PRESSURE: 73 MMHG | OXYGEN SATURATION: 91 %

## 2021-09-02 DIAGNOSIS — N20.0 NEPHROLITHIASIS: Primary | ICD-10-CM

## 2021-09-02 LAB
ANION GAP SERPL CALCULATED.3IONS-SCNC: 13 MMOL/L (ref 3–16)
BUN BLDV-MCNC: 30 MG/DL (ref 7–20)
CALCIUM SERPL-MCNC: 9.8 MG/DL (ref 8.3–10.6)
CHLORIDE BLD-SCNC: 105 MMOL/L (ref 99–110)
CO2: 21 MMOL/L (ref 21–32)
CREAT SERPL-MCNC: 1.3 MG/DL (ref 0.9–1.3)
GFR AFRICAN AMERICAN: >60
GFR NON-AFRICAN AMERICAN: 57
GLUCOSE BLD-MCNC: 120 MG/DL (ref 70–99)
GLUCOSE BLD-MCNC: 135 MG/DL (ref 70–99)
GLUCOSE BLD-MCNC: 94 MG/DL (ref 70–99)
HCT VFR BLD CALC: 51.6 % (ref 40.5–52.5)
HEMOGLOBIN: 17.7 G/DL (ref 13.5–17.5)
MCH RBC QN AUTO: 33.1 PG (ref 26–34)
MCHC RBC AUTO-ENTMCNC: 34.2 G/DL (ref 31–36)
MCV RBC AUTO: 96.8 FL (ref 80–100)
PDW BLD-RTO: 13.4 % (ref 12.4–15.4)
PERFORMED ON: ABNORMAL
PERFORMED ON: NORMAL
PLATELET # BLD: 282 K/UL (ref 135–450)
PMV BLD AUTO: 8.8 FL (ref 5–10.5)
POTASSIUM SERPL-SCNC: 4.8 MMOL/L (ref 3.5–5.1)
RBC # BLD: 5.33 M/UL (ref 4.2–5.9)
SODIUM BLD-SCNC: 139 MMOL/L (ref 136–145)
WBC # BLD: 10.2 K/UL (ref 4–11)

## 2021-09-02 PROCEDURE — 7100000011 HC PHASE II RECOVERY - ADDTL 15 MIN: Performed by: UROLOGY

## 2021-09-02 PROCEDURE — 2709999900 HC NON-CHARGEABLE SUPPLY: Performed by: UROLOGY

## 2021-09-02 PROCEDURE — 36415 COLL VENOUS BLD VENIPUNCTURE: CPT

## 2021-09-02 PROCEDURE — 3600000004 HC SURGERY LEVEL 4 BASE: Performed by: UROLOGY

## 2021-09-02 PROCEDURE — 6360000002 HC RX W HCPCS: Performed by: UROLOGY

## 2021-09-02 PROCEDURE — C1769 GUIDE WIRE: HCPCS | Performed by: UROLOGY

## 2021-09-02 PROCEDURE — 3600000014 HC SURGERY LEVEL 4 ADDTL 15MIN: Performed by: UROLOGY

## 2021-09-02 PROCEDURE — 7100000010 HC PHASE II RECOVERY - FIRST 15 MIN: Performed by: UROLOGY

## 2021-09-02 PROCEDURE — 3700000001 HC ADD 15 MINUTES (ANESTHESIA): Performed by: UROLOGY

## 2021-09-02 PROCEDURE — 6360000002 HC RX W HCPCS: Performed by: NURSE ANESTHETIST, CERTIFIED REGISTERED

## 2021-09-02 PROCEDURE — 80048 BASIC METABOLIC PNL TOTAL CA: CPT

## 2021-09-02 PROCEDURE — 2580000003 HC RX 258: Performed by: UROLOGY

## 2021-09-02 PROCEDURE — 2500000003 HC RX 250 WO HCPCS: Performed by: NURSE ANESTHETIST, CERTIFIED REGISTERED

## 2021-09-02 PROCEDURE — 2720000010 HC SURG SUPPLY STERILE: Performed by: UROLOGY

## 2021-09-02 PROCEDURE — 7100000001 HC PACU RECOVERY - ADDTL 15 MIN: Performed by: UROLOGY

## 2021-09-02 PROCEDURE — 2580000003 HC RX 258: Performed by: NURSE ANESTHETIST, CERTIFIED REGISTERED

## 2021-09-02 PROCEDURE — 3700000000 HC ANESTHESIA ATTENDED CARE: Performed by: UROLOGY

## 2021-09-02 PROCEDURE — 85027 COMPLETE CBC AUTOMATED: CPT

## 2021-09-02 PROCEDURE — 6360000002 HC RX W HCPCS: Performed by: ANESTHESIOLOGY

## 2021-09-02 PROCEDURE — 74420 UROGRAPHY RTRGR +-KUB: CPT

## 2021-09-02 PROCEDURE — 7100000000 HC PACU RECOVERY - FIRST 15 MIN: Performed by: UROLOGY

## 2021-09-02 RX ORDER — SODIUM CHLORIDE 9 MG/ML
INJECTION, SOLUTION INTRAVENOUS CONTINUOUS
Status: DISCONTINUED | OUTPATIENT
Start: 2021-09-02 | End: 2021-09-02 | Stop reason: HOSPADM

## 2021-09-02 RX ORDER — SODIUM CHLORIDE 9 MG/ML
INJECTION, SOLUTION INTRAVENOUS CONTINUOUS PRN
Status: DISCONTINUED | OUTPATIENT
Start: 2021-09-02 | End: 2021-09-02 | Stop reason: SDUPTHER

## 2021-09-02 RX ORDER — ONDANSETRON 2 MG/ML
INJECTION INTRAMUSCULAR; INTRAVENOUS PRN
Status: DISCONTINUED | OUTPATIENT
Start: 2021-09-02 | End: 2021-09-02 | Stop reason: SDUPTHER

## 2021-09-02 RX ORDER — LIDOCAINE HYDROCHLORIDE 10 MG/ML
0.5 INJECTION, SOLUTION EPIDURAL; INFILTRATION; INTRACAUDAL; PERINEURAL ONCE
Status: DISCONTINUED | OUTPATIENT
Start: 2021-09-02 | End: 2021-09-02 | Stop reason: HOSPADM

## 2021-09-02 RX ORDER — HYDROMORPHONE HCL 110MG/55ML
0.5 PATIENT CONTROLLED ANALGESIA SYRINGE INTRAVENOUS EVERY 5 MIN PRN
Status: DISCONTINUED | OUTPATIENT
Start: 2021-09-02 | End: 2021-09-02 | Stop reason: HOSPADM

## 2021-09-02 RX ORDER — MIDAZOLAM HYDROCHLORIDE 1 MG/ML
INJECTION INTRAMUSCULAR; INTRAVENOUS PRN
Status: DISCONTINUED | OUTPATIENT
Start: 2021-09-02 | End: 2021-09-02 | Stop reason: SDUPTHER

## 2021-09-02 RX ORDER — MEPERIDINE HYDROCHLORIDE 25 MG/ML
12.5 INJECTION INTRAMUSCULAR; INTRAVENOUS; SUBCUTANEOUS EVERY 5 MIN PRN
Status: DISCONTINUED | OUTPATIENT
Start: 2021-09-02 | End: 2021-09-02 | Stop reason: HOSPADM

## 2021-09-02 RX ORDER — OXYCODONE HYDROCHLORIDE AND ACETAMINOPHEN 5; 325 MG/1; MG/1
1 TABLET ORAL
Status: DISCONTINUED | OUTPATIENT
Start: 2021-09-02 | End: 2021-09-02 | Stop reason: HOSPADM

## 2021-09-02 RX ORDER — ONDANSETRON 2 MG/ML
4 INJECTION INTRAMUSCULAR; INTRAVENOUS
Status: DISCONTINUED | OUTPATIENT
Start: 2021-09-02 | End: 2021-09-02 | Stop reason: HOSPADM

## 2021-09-02 RX ORDER — PHENYLEPHRINE HCL IN 0.9% NACL 1 MG/10 ML
SYRINGE (ML) INTRAVENOUS PRN
Status: DISCONTINUED | OUTPATIENT
Start: 2021-09-02 | End: 2021-09-02 | Stop reason: SDUPTHER

## 2021-09-02 RX ORDER — HYDRALAZINE HYDROCHLORIDE 20 MG/ML
5 INJECTION INTRAMUSCULAR; INTRAVENOUS EVERY 10 MIN PRN
Status: DISCONTINUED | OUTPATIENT
Start: 2021-09-02 | End: 2021-09-02 | Stop reason: HOSPADM

## 2021-09-02 RX ORDER — ROCURONIUM BROMIDE 10 MG/ML
INJECTION, SOLUTION INTRAVENOUS PRN
Status: DISCONTINUED | OUTPATIENT
Start: 2021-09-02 | End: 2021-09-02 | Stop reason: SDUPTHER

## 2021-09-02 RX ORDER — FENTANYL CITRATE 50 UG/ML
INJECTION, SOLUTION INTRAMUSCULAR; INTRAVENOUS PRN
Status: DISCONTINUED | OUTPATIENT
Start: 2021-09-02 | End: 2021-09-02 | Stop reason: SDUPTHER

## 2021-09-02 RX ORDER — MAGNESIUM HYDROXIDE 1200 MG/15ML
LIQUID ORAL
Status: COMPLETED | OUTPATIENT
Start: 2021-09-02 | End: 2021-09-02

## 2021-09-02 RX ORDER — LIDOCAINE HYDROCHLORIDE 20 MG/ML
INJECTION, SOLUTION EPIDURAL; INFILTRATION; INTRACAUDAL; PERINEURAL PRN
Status: DISCONTINUED | OUTPATIENT
Start: 2021-09-02 | End: 2021-09-02 | Stop reason: SDUPTHER

## 2021-09-02 RX ORDER — HYDROCODONE BITARTRATE AND ACETAMINOPHEN 5; 325 MG/1; MG/1
1 TABLET ORAL EVERY 6 HOURS PRN
Qty: 12 TABLET | Refills: 0 | Status: SHIPPED | OUTPATIENT
Start: 2021-09-02 | End: 2021-09-05

## 2021-09-02 RX ORDER — PROPOFOL 10 MG/ML
INJECTION, EMULSION INTRAVENOUS PRN
Status: DISCONTINUED | OUTPATIENT
Start: 2021-09-02 | End: 2021-09-02 | Stop reason: SDUPTHER

## 2021-09-02 RX ORDER — SUCCINYLCHOLINE/SOD CL,ISO/PF 200MG/10ML
SYRINGE (ML) INTRAVENOUS PRN
Status: DISCONTINUED | OUTPATIENT
Start: 2021-09-02 | End: 2021-09-02 | Stop reason: SDUPTHER

## 2021-09-02 RX ORDER — DEXAMETHASONE SODIUM PHOSPHATE 4 MG/ML
INJECTION, SOLUTION INTRA-ARTICULAR; INTRALESIONAL; INTRAMUSCULAR; INTRAVENOUS; SOFT TISSUE PRN
Status: DISCONTINUED | OUTPATIENT
Start: 2021-09-02 | End: 2021-09-02 | Stop reason: SDUPTHER

## 2021-09-02 RX ORDER — LABETALOL HYDROCHLORIDE 5 MG/ML
5 INJECTION, SOLUTION INTRAVENOUS EVERY 10 MIN PRN
Status: DISCONTINUED | OUTPATIENT
Start: 2021-09-02 | End: 2021-09-02 | Stop reason: HOSPADM

## 2021-09-02 RX ADMIN — ONDANSETRON 4 MG: 2 INJECTION INTRAMUSCULAR; INTRAVENOUS at 14:30

## 2021-09-02 RX ADMIN — MIDAZOLAM 2 MG: 1 INJECTION INTRAMUSCULAR; INTRAVENOUS at 13:57

## 2021-09-02 RX ADMIN — PROPOFOL 200 MG: 10 INJECTION, EMULSION INTRAVENOUS at 13:57

## 2021-09-02 RX ADMIN — Medication 140 MG: at 13:57

## 2021-09-02 RX ADMIN — CEFAZOLIN 2000 MG: 10 INJECTION, POWDER, FOR SOLUTION INTRAVENOUS at 13:45

## 2021-09-02 RX ADMIN — DEXAMETHASONE SODIUM PHOSPHATE 4 MG: 4 INJECTION, SOLUTION INTRAMUSCULAR; INTRAVENOUS at 14:03

## 2021-09-02 RX ADMIN — SODIUM CHLORIDE: 9 INJECTION, SOLUTION INTRAVENOUS at 14:39

## 2021-09-02 RX ADMIN — FENTANYL CITRATE 100 MCG: 50 INJECTION, SOLUTION INTRAMUSCULAR; INTRAVENOUS at 13:57

## 2021-09-02 RX ADMIN — Medication 100 MCG: at 14:14

## 2021-09-02 RX ADMIN — ROCURONIUM BROMIDE 30 MG: 10 INJECTION, SOLUTION INTRAVENOUS at 14:03

## 2021-09-02 RX ADMIN — Medication 200 MCG: at 14:21

## 2021-09-02 RX ADMIN — SODIUM CHLORIDE: 9 INJECTION, SOLUTION INTRAVENOUS at 13:48

## 2021-09-02 RX ADMIN — LIDOCAINE HYDROCHLORIDE 100 MG: 20 INJECTION, SOLUTION EPIDURAL; INFILTRATION; INTRACAUDAL; PERINEURAL at 13:57

## 2021-09-02 RX ADMIN — HYDROMORPHONE HYDROCHLORIDE 0.5 MG: 2 INJECTION, SOLUTION INTRAMUSCULAR; INTRAVENOUS; SUBCUTANEOUS at 15:08

## 2021-09-02 RX ADMIN — Medication 100 MCG: at 14:12

## 2021-09-02 RX ADMIN — Medication 200 MCG: at 14:08

## 2021-09-02 RX ADMIN — SUGAMMADEX 200 MG: 100 INJECTION, SOLUTION INTRAVENOUS at 14:30

## 2021-09-02 RX ADMIN — Medication 100 MCG: at 14:05

## 2021-09-02 ASSESSMENT — PULMONARY FUNCTION TESTS
PIF_VALUE: 22
PIF_VALUE: 22
PIF_VALUE: 17
PIF_VALUE: 1
PIF_VALUE: 23
PIF_VALUE: 22
PIF_VALUE: 22
PIF_VALUE: 23
PIF_VALUE: 21
PIF_VALUE: 24
PIF_VALUE: 23
PIF_VALUE: 0
PIF_VALUE: 19
PIF_VALUE: 6
PIF_VALUE: 23
PIF_VALUE: 1
PIF_VALUE: 22
PIF_VALUE: 19
PIF_VALUE: 4
PIF_VALUE: 22
PIF_VALUE: 23
PIF_VALUE: 23
PIF_VALUE: 25
PIF_VALUE: 1
PIF_VALUE: 23
PIF_VALUE: 22
PIF_VALUE: 23
PIF_VALUE: 0
PIF_VALUE: 22
PIF_VALUE: 3
PIF_VALUE: 22
PIF_VALUE: 22
PIF_VALUE: 2
PIF_VALUE: 23
PIF_VALUE: 0
PIF_VALUE: 22
PIF_VALUE: 22
PIF_VALUE: 23
PIF_VALUE: 22
PIF_VALUE: 20
PIF_VALUE: 22
PIF_VALUE: 0
PIF_VALUE: 23
PIF_VALUE: 3
PIF_VALUE: 22
PIF_VALUE: 0
PIF_VALUE: 23
PIF_VALUE: 20
PIF_VALUE: 26
PIF_VALUE: 23
PIF_VALUE: 0
PIF_VALUE: 22
PIF_VALUE: 23

## 2021-09-02 ASSESSMENT — PAIN SCALES - GENERAL
PAINLEVEL_OUTOF10: 3
PAINLEVEL_OUTOF10: 7

## 2021-09-02 NOTE — OP NOTE
Urology Operative Report  [facility]    Provider: Maryse Tadeo MD MD Patient ID:  Admission Date: 2021 Name: Papa Dunham Date: 2021  MRN: [id number]   Patient Location: OR/NONE : 1965  Attending: Maryse Tadeo MD Date of Service: 2021  PCP: Yosi Zhu MD     Date of Operation: 2021    Preoperative Diagnosis: LEFT side upper ureteral stone    Postoperative Diagnosis: Left side renal calculi    Procedure:    1. Cystoscopy  2. Left side ureteral stent removal  3. Left ureteroscopy  4. Laser lithotripsy  5. Fluoroscopy <1hr MD time    Surgeon:   Maryse Tadeo MD, MD    Anesthesia: General LMA anesthesia    Indications: Leona Metcalf is a 49 y.o. male who presents for the above named surgery. Informed consent was obtained and the risks, benefits, and details of the procedure were explained to the patient who elected to proceed. Details of Procedure: The patient was brought to the operating room and placed in the supine position on the operating room table. SCDs were placed on the lower extremities. Following induction of anesthesia the patient was positioned in a lithotomy position, all pressure points were padded, and the genitals were prepped and draped in the usual sterile fashion. A routine timeout was performed, confirming the patient, procedure, site, risk of fire, patient allergies and confirming that preoperative antibiotics had been administered prior to beginning. A 21 fr rigid cystoscope was advance via a normal appearing urethra into the bladder. The bladder was inspected and there were no suspicious lesions, stones or diverticula seen. Attention was turned to the left ureteral orifice and the previously placed ureteral stent was gasped and removed to the urethral meatus. There was noted to be encrustation on the distal end of the stent despite the stent only being in for a little over 1 week.   A 0.035 sensor wire was advanced via the lumen of the stent and positioned within the collecting system under control of fluoroscopy. A flexible ureteroscope was advanced alongside the safety wire. There was no stone seen in the ureteral lumen. The scope was advanced all the way up in the renal collecting system. All calyces were thoroughly inspected. There were 2 stones identified in the lower pole. I suspect one stone had been there on his preoperative CT scan, the second stone was the obstructing ureteral stone which had been pressed backwards by the stent. A 200nm laser fiber was used to perform lithotripsy until all stone burden was fragmented. The stone was noted to fragment very nicely, and we felt that all stone fragments were passable. The renal collecting system was inspected systematically and no significant residual stone burden was seen. At this point is decided that no replacement stent was required. The ureter was inspected on removal of the scope and safety wire. The cystoscope was used to irrigate the bladder of stone debris. The bladder was emptied and the scope removed. At the end of the procedure all counts were correct. The patient tolerated the procedure well and was transported to the PACU in stable condition. Findings: Successful laser lithotripsy of left side renal collecting system stones. No stent was replaced. Estimated Blood Loss: minimal                  Drains: None          Specimens: None    Complications: none apparent           Disposition:  PACU - hemodynamically stable.             Janiya Greene MD, MD  9/2/2021

## 2021-09-02 NOTE — PROGRESS NOTES
CLINICAL PHARMACY NOTE: MEDS TO BEDS    Total # of Prescriptions Filled: 1   The following medications were delivered to the patient:  · Norco 5-325 mg    Additional Documentation:  Delivered to Patient-signed  Eldon Villarreal CPhT

## 2021-09-02 NOTE — ANESTHESIA PRE PROCEDURE
Department of Anesthesiology  Preprocedure Note       Name:  Jomar Villarreal   Age:  49 y.o.  :  1965                                          MRN:  [id number]         Date:  2021      Surgeon: Salvador Lion):  Bradford Gardner MD    Procedure: Procedure(s):  CYSTOSCOPY WITH LEFT URETEROSCOPY WITH HOLMIUM LASER LITHOTRIPSY, STONE MANIPULATION, STONE BASKET AND POSSIBLE LEFT STENT PLACEMENT    Medications prior to admission:   Prior to Admission medications    Medication Sig Start Date End Date Taking? Authorizing Provider   amLODIPine (NORVASC) 5 MG tablet Take 1 tablet by mouth daily 21   Ortega Arguello MD   Potassium Citrate ER 15 MEQ (1620 MG) TBCR Take 15 mEq by mouth 3 times daily (with meals)     Historical Provider, MD   azelastine (ASTELIN) 0.1 % nasal spray 1 spray by Nasal route 2 times daily Use in each nostril as directed 21   Ariel Cobos MD   glimepiride (AMARYL) 2 MG tablet TAKE 1 TABLET BY MOUTH EVERY DAY IN THE MORNING 21   Ariel Cobos MD   aspirin 81 MG EC tablet Take 81 mg by mouth daily    Historical Provider, MD   hyoscyamine (LEVSIN) 125 MCG tablet Take 1 tablet by mouth every 4 hours as needed for Cramping 19   Miya Thrasher MD   albuterol sulfate HFA (PROAIR HFA) 108 (90 Base) MCG/ACT inhaler Inhale 2 puffs into the lungs 4 times daily as needed for Wheezing 18   Ariel Cobos MD   glucose blood VI test strips (ONE TOUCH ULTRA TEST) strip Tests once daily 14   Ariel Cobos MD   Blood Glucose Monitoring Suppl (ONE TOUCH ULTRA) by Does not apply route. Historical Provider, MD   famotidine (PEPCID) 20 MG tablet Take 20 mg by mouth daily. Historical Provider, MD       Current medications:    No current facility-administered medications for this visit. No current outpatient medications on file.      Facility-Administered Medications Ordered in Other Visits   Medication Dose Route Frequency Provider Last Rate Last Admin    lidocaine PF 1 % injection 0.5 mL  0.5 mL IntraDERmal Once Sammie Gaines MD        ceFAZolin (ANCEF) 2000 mg in dextrose 5 % 50 mL IVPB  2,000 mg IntraVENous On Call to [facility] [address], MD        0.9 % sodium chloride infusion   IntraVENous Continuous Sammie Gaines MD           Allergies: Allergies   Allergen Reactions    Ace Inhibitors      COUGH      Pollen Extract        Problem List:    Patient Active Problem List   Diagnosis Code    Essential hypertension I10    Obesity E66.9    Sleep apnea G47.30    Type 2 diabetes mellitus without complication, without long-term current use of insulin (HCC) E11.9    Mild intermittent asthma without complication A97.19    Carpal tunnel syndrome of right wrist G56.01    Nephrolithiasis N20.0       Past Medical History:        Diagnosis Date    Diabetes mellitus ([address]     Hypertension     Obesity, unspecified     MARIA A on CPAP     Other abnormal blood chemistry     Seasonal allergies     Unspecified sleep apnea        Past Surgical History:        Procedure Laterality Date    CATARACT REMOVAL WITH IMPLANT  2009, 2010    B eyes    CYSTOSCOPY Left 8/24/2021    CYSTOSCOPY, LEFT RETROGRADE PYELOGRAM, PLACEMENT OF LEFT URETERAL STENT performed by Sammie Gaines MD at [facility]  08/2018       Social History:    Social History     Tobacco Use    Smoking status: Never Smoker    Smokeless tobacco: Never Used   Substance Use Topics    Alcohol use: Yes     Comment: socially                                Counseling given: Not Answered      Vital Signs (Current): There were no vitals filed for this visit.                                            BP Readings from Last 3 Encounters:   09/02/21 (!) 153/100   08/26/21 (!) 151/86   08/24/21 (!) 100/58       NPO Status:                                                                                 BMI:   Wt Readings from Last 3 Encounters:   09/02/21 268 lb (121.6 Vascular: Other Findings:               Anesthesia Plan      general     ASA 3       Induction: intravenous. MIPS: Postoperative opioids intended, Prophylactic antiemetics administered and Postoperative trial extubation. Anesthetic plan and risks discussed with patient. Plan discussed with CRNA.                   Bryon De Luna MD   9/2/2021

## 2021-09-02 NOTE — H&P
Urology History and Physical  Inpatient Setting - [facility]    Provider: Clive Silverman MD MD Patient ID:  Admission Date: 2021 Name: Stefany Almanzar Date: n/a MRN: [id number]   Patient Location: OR/NONE : 1965  Attending: Clive Silverman MD Date of Service: 2021  PCP: Tram Choudhury MD     Diagnoses:  LEFT ureteral stones    Assessment/Plan:  Continue to the OR as planned for LEFT sided ureteroscopy. All the patients questions were answered in detail. He understands the plan as listed above.                                                                                                                                               _____________________________________________________________    CC: Pre-op    HPI: Joel Hassan is a 49 y.o. male. The history and physical exam was reviewed. The patient was seen and examined in pre-op. He had a chance to ask questions which were answered. There has been no interval changes. Plan to continue to the OR    Past Medical History:   He has a past medical history of Diabetes mellitus ([facility] [address] Hypertension, Obesity, unspecified, MARIA A on CPAP, Other abnormal blood chemistry, Seasonal allergies, and Unspecified sleep apnea. Past Surgical History:  He has a past surgical history that includes Cataract removal with implant (, ); Tooth Extraction (2018); and Cystoscopy (Left, 2021). Allergies: Allergies   Allergen Reactions    Ace Inhibitors      COUGH      Pollen Extract        Social History:  He reports that he has never smoked. He has never used smokeless tobacco. He reports current alcohol use. He reports that he does not use drugs. Family History:  family history includes Arthritis in his mother; High Blood Pressure in his father; Kidney stones in his mother; Other in his father and mother.     Medications:   Scheduled Meds:   lidocaine PF  0.5 mL IntraDERmal Once    ceFAZolin  2,000 mg IntraVENous On Call to OR     Continuous Infusions:   sodium chloride       PRN Meds:    Review of Systems:  Constitutional: Negative for fever    Genitourinary: see HPI  Eyes: negative for sudden change in vision  EENT: no complaints  Cardiovascular: Negative for chest pain  Respiratory: Negative for shortness of breath  Gastrointestinal: Negative for nausea  Musculoskeletal: Negative for back pain   Neurological: Negative for weakness  Psychiatric: Negative for anxiety  Integumentary: Negative for rashes or adenopathy     Physical Exam:  Vitals:    09/02/21 1223   BP: (!) 153/100   Pulse: 112   Resp: 16   Temp: 97.4 °F (36.3 °C)   SpO2: 97%     Constitutional: NAD, well-developed, well-nourished. Cardiovascular: Regular rate. No peripheral edema  Respiratory: Respirations are even and non-labored. No audible breath sounds. Psychiatric: A + O x 3, normal affect. Insight appears intact.      Pritesh De Leon MD, MD  9/2/2021

## 2021-09-02 NOTE — ANESTHESIA POSTPROCEDURE EVALUATION
Department of Anesthesiology  Postprocedure Note    Patient: Ryan Yousif  MRN: [id number]  YOB: 1965  Date of evaluation: 9/2/2021  Time:  4:25 PM     Procedure Summary     Date: 09/02/21 Room / Location: [address] [place] [address]    Anesthesia Start: 5709 Anesthesia Stop: 2320    Procedure: CYSTOSCOPY WITH LEFT URETEROSCOPY WITH HOLMIUM LASER LITHOTRIPSY, STONE MANIPULATION AND LEFT STENT REMOVAL (Left ) Diagnosis: (N20.0 CALCULUS RENAL)    Surgeons: Wyatt Amezquita MD Responsible Provider: Josie Escamilla MD    Anesthesia Type: general ASA Status: 3          Anesthesia Type: general    Anibal Phase I: Anibal Score: 10    Anibal Phase II: Anibal Score: 10    Last vitals: Reviewed and per EMR flowsheets.        Anesthesia Post Evaluation    Patient location during evaluation: PACU  Patient participation: complete - patient participated  Level of consciousness: awake  Airway patency: patent  Nausea & Vomiting: no vomiting  Complications: no  Cardiovascular status: hemodynamically stable  Respiratory status: acceptable  Hydration status: euvolemic

## 2021-09-02 NOTE — PROGRESS NOTES
Discharge note:Discharge order obtained, and discharge instructions reviewed. Patient educated, using the teach back method, about follow up instructions and discharge instructions. A completed copy of the AVS instructions given to patient and all questions answered. IV catheter removed without complaints, catheter intact, site WNL. Discharged to lobby via wheel chair per RN.

## 2021-09-02 NOTE — FLOWSHEET NOTE
Phase 1 completely, pt seen by anesthesiologist. VSS. Pt able to urinate in small amount, light pink in color. Pt eating and drinking well. Waiting on prescriptions, wife at bedside.

## 2021-09-03 ENCOUNTER — CARE COORDINATION (OUTPATIENT)
Dept: CASE MANAGEMENT | Age: 56
End: 2021-09-03

## 2021-09-03 NOTE — CARE COORDINATION
[facility] Transitions Follow Up Call    9/3/2021    Patient: Olga Grider  Patient : 1965   MRN: [id number]  Reason for Admission:   Discharge Date: 21 RARS: Readmission Risk Score: 14       Follow up call attempted, left contact info on vm    Follow Up  Future Appointments   Date Time Provider Elizabeth Rosenbaum     2:41 PM QUINCY Arnold - CNP [facility]   1/3/2022  9:00 AM MD Fantasma [id number] - DYD       Vivien Marc RN

## 2021-09-08 ENCOUNTER — CARE COORDINATION (OUTPATIENT)
Dept: CASE MANAGEMENT | Age: 56
End: 2021-09-08

## 2021-09-08 NOTE — CARE COORDINATION
[facility] Transitions Follow Up Call    2021    Patient: Raul Sanchez  Patient : 1965   MRN: [id number]  Reason for Admission:   Discharge Date: 21 RARS: Readmission Risk Score: 15         Spoke with: [facility] Transitions Subsequent and Final Call    Subsequent and Final Calls  Do you have any ongoing symptoms?: No  Have your medications changed?: No  Do you have any questions related to your medications?: No  Do you currently have any active services?: No  Do you have any needs or concerns that I can assist you with?: No  Identified Barriers: None  Care Transitions Interventions  No Identified Needs  Other Interventions:         Pt states doing well, no issues or concerns. Had stone broken up last week per surgeon, painful when pieces pass, using OTC pain medication. F/U appts listed below.  Agreed to more CTC f/u calls      Follow Up  Future Appointments   Date Time Provider Elizabeth Rosenbaum   3913  0:13 PM QUINCY Estrada - CNP [facility]   1/3/2022  9:00 AM Malgorzata Carranza MD [address]       Danay Mckenzie RN

## 2021-09-10 ENCOUNTER — HOSPITAL ENCOUNTER (OUTPATIENT)
Age: 56
Discharge: HOME | End: 2021-09-10
Payer: COMMERCIAL

## 2021-09-10 DIAGNOSIS — D63.1 ANEMIA IN CHRONIC KIDNEY DISEASE, UNSPECIFIED CKD STAGE: ICD-10-CM

## 2021-09-10 DIAGNOSIS — N18.9 ANEMIA IN CHRONIC KIDNEY DISEASE, UNSPECIFIED CKD STAGE: ICD-10-CM

## 2021-09-10 LAB
ALBUMIN SERPL-MCNC: 4.2 G/DL (ref 3.4–5)
ANION GAP SERPL CALCULATED.3IONS-SCNC: 11 MMOL/L (ref 3–16)
BUN BLDV-MCNC: 34 MG/DL (ref 7–20)
CALCIUM SERPL-MCNC: 10.2 MG/DL (ref 8.3–10.6)
CHLORIDE BLD-SCNC: 103 MMOL/L (ref 99–110)
CO2: 25 MMOL/L (ref 21–32)
CREAT SERPL-MCNC: 1.4 MG/DL (ref 0.9–1.3)
CREATININE URINE: 179.2 MG/DL (ref 39–259)
GFR AFRICAN AMERICAN: >60
GFR NON-AFRICAN AMERICAN: 53
GLUCOSE BLD-MCNC: 115 MG/DL (ref 70–99)
HCT VFR BLD CALC: 45.6 % (ref 40.5–52.5)
HEMOGLOBIN: 15.7 G/DL (ref 13.5–17.5)
MCH RBC QN AUTO: 33.2 PG (ref 26–34)
MCHC RBC AUTO-ENTMCNC: 34.4 G/DL (ref 31–36)
MCV RBC AUTO: 96.4 FL (ref 80–100)
PDW BLD-RTO: 13.1 % (ref 12.4–15.4)
PHOSPHORUS: 4.3 MG/DL (ref 2.5–4.9)
PLATELET # BLD: 227 K/UL (ref 135–450)
PMV BLD AUTO: 9.1 FL (ref 5–10.5)
POTASSIUM SERPL-SCNC: 4.6 MMOL/L (ref 3.5–5.1)
PROTEIN PROTEIN: 9 MG/DL
RBC # BLD: 4.73 M/UL (ref 4.2–5.9)
SODIUM BLD-SCNC: 139 MMOL/L (ref 136–145)
WBC # BLD: 8.1 K/UL (ref 4–11)

## 2021-09-10 PROCEDURE — 84156 ASSAY OF PROTEIN URINE: CPT

## 2021-09-10 PROCEDURE — 85027 COMPLETE CBC AUTOMATED: CPT

## 2021-09-10 PROCEDURE — 80069 RENAL FUNCTION PANEL: CPT

## 2021-09-10 PROCEDURE — 82570 ASSAY OF URINE CREATININE: CPT

## 2021-09-10 PROCEDURE — 36415 COLL VENOUS BLD VENIPUNCTURE: CPT

## 2021-09-15 ENCOUNTER — CARE COORDINATION (OUTPATIENT)
Dept: CASE MANAGEMENT | Age: 56
End: 2021-09-15

## 2021-09-15 NOTE — CARE COORDINATION
[facility] Transitions Follow Up Call    9/15/2021    Patient: Ryan Youisf  Patient : 1965   MRN: [id number]  Reason for Admission:   Discharge Date: 21 RARS: Readmission Risk Score: 14         Follow up call attempted, left contact info on vm      Follow Up  Future Appointments   Date Time Provider Elizabeth Rosenbaum     2:16 PM QUINCY Kimble - CNP [facility]   1/3/2022  9:00 AM MD Fantasma [facility]aud[facility] [id number] - DYD       Maria T Andrews RN

## 2021-09-21 ENCOUNTER — PATIENT MESSAGE (OUTPATIENT)
Dept: FAMILY MEDICINE CLINIC | Age: 56
End: 2021-09-21

## 2021-09-21 ENCOUNTER — CARE COORDINATION (OUTPATIENT)
Dept: CASE MANAGEMENT | Age: 56
End: 2021-09-21

## 2021-09-21 NOTE — TELEPHONE ENCOUNTER
From: Julianne Canada  To: Bessie Del Valle MD  Sent: 9/21/2021 3:39 PM EDT  Subject: Prescription Question    Good Morning. 2 weeks ago now I had to have surgery to remove two large kidney stones which were blocking up the left kidney. While in the hospital I was put on Amlodipine, but taken off: Losartin, Metformin, naproxen, & Vitamin C CR. I assumed I was to stay on the Amlodipine, but they only gave me 1 prescription with no refills. My questions are: do I stay on the Amlodipine? do I go back to the losartan? Do I keep doing this course of treatment? The kidney doctor told me to stop taking the vitamin c cr, because my calcium levels were good. What do you recommend. Thanks    Jasmin Grewal  1965

## 2021-09-21 NOTE — CARE COORDINATION
[facility] Transitions Follow Up Call    2021    Patient: Everardo Newby  Patient : 1965   MRN: [id number]  Reason for Admission:   Discharge Date: 21 RARS: Readmission Risk Score: 15         Spoke with: 1705 [facility] Transitions Subsequent and Final Call    Subsequent and Final Calls  Do you have any ongoing symptoms?: No  Have your medications changed?: No  Do you have any questions related to your medications?: No  Do you currently have any active services?: No  Do you have any needs or concerns that I can assist you with?: No  Identified Barriers: None  Care Transitions Interventions  No Identified Needs  Other Interventions:         Pt states doing well, no issues or concerns except needs a refill on his norvasc that the hospitalist prescribed, this nurse directed him to his PCP, verbalized understanding.  No need for further f/u CTC calls    Follow Up  Future Appointments   Date Time Provider Elizabeth Rosenbaum     8:16 AM QUINCY Clark - CNP [facility]   1/3/2022  9:00 AM MD Mahesh Downs RN

## 2021-09-22 RX ORDER — AMLODIPINE BESYLATE 5 MG/1
5 TABLET ORAL DAILY
Qty: 30 TABLET | Refills: 5 | Status: SHIPPED | OUTPATIENT
Start: 2021-09-22 | End: 2022-03-28

## 2021-10-04 RX ORDER — GLIMEPIRIDE 2 MG/1
TABLET ORAL
Qty: 90 TABLET | Refills: 1 | Status: SHIPPED | OUTPATIENT
Start: 2021-10-04 | End: 2022-06-20

## 2021-10-08 ENCOUNTER — TELEPHONE (OUTPATIENT)
Dept: FAMILY MEDICINE CLINIC | Age: 56
End: 2021-10-08

## 2021-10-08 NOTE — TELEPHONE ENCOUNTER
----- Message from Dana Jeffry sent at 10/7/2021 10:40 AM EDT -----  Subject: Message to Provider    QUESTIONS  Information for Provider? Patient would like records faxed to [facility] in [place] please fax records to  Id number is   [id number]   ---------------------------------------------------------------------------  --------------  CALL BACK INFO  What is the best way for the office to contact you? OK to leave message on   voicemail  Preferred Call Back Phone Number? [phone]  ---------------------------------------------------------------------------  --------------  SCRIPT ANSWERS  Relationship to Patient?  Self

## 2021-11-10 ENCOUNTER — HOSPITAL ENCOUNTER (OUTPATIENT)
Age: 56
Discharge: HOME | End: 2021-11-10
Payer: COMMERCIAL

## 2021-11-10 DIAGNOSIS — N18.31 STAGE 3A CHRONIC KIDNEY DISEASE (HCC): ICD-10-CM

## 2021-11-10 DIAGNOSIS — I12.9 TYPE 2 DIABETES MELLITUS WITH STAGE 3 CHRONIC KIDNEY DISEASE AND HYPERTENSION (HCC): ICD-10-CM

## 2021-11-10 DIAGNOSIS — N18.30 TYPE 2 DIABETES MELLITUS WITH STAGE 3 CHRONIC KIDNEY DISEASE AND HYPERTENSION (HCC): ICD-10-CM

## 2021-11-10 DIAGNOSIS — Z09 HOSPITAL DISCHARGE FOLLOW-UP: ICD-10-CM

## 2021-11-10 DIAGNOSIS — N17.9 AKI (ACUTE KIDNEY INJURY) (HCC): ICD-10-CM

## 2021-11-10 DIAGNOSIS — E11.22 TYPE 2 DIABETES MELLITUS WITH STAGE 3 CHRONIC KIDNEY DISEASE AND HYPERTENSION (HCC): ICD-10-CM

## 2021-11-10 LAB
ALBUMIN SERPL-MCNC: 4.3 G/DL (ref 3.4–5)
ANION GAP SERPL CALCULATED.3IONS-SCNC: 13 MMOL/L (ref 3–16)
BUN BLDV-MCNC: 22 MG/DL (ref 7–20)
CALCIUM SERPL-MCNC: 9.5 MG/DL (ref 8.3–10.6)
CHLORIDE BLD-SCNC: 103 MMOL/L (ref 99–110)
CO2: 26 MMOL/L (ref 21–32)
CREAT SERPL-MCNC: 1.1 MG/DL (ref 0.9–1.3)
CREATININE URINE: 110.2 MG/DL (ref 39–259)
GFR AFRICAN AMERICAN: >60
GFR NON-AFRICAN AMERICAN: >60
GLUCOSE BLD-MCNC: 137 MG/DL (ref 70–99)
HCT VFR BLD CALC: 47.5 % (ref 40.5–52.5)
HEMOGLOBIN: 16.4 G/DL (ref 13.5–17.5)
MCH RBC QN AUTO: 33.4 PG (ref 26–34)
MCHC RBC AUTO-ENTMCNC: 34.5 G/DL (ref 31–36)
MCV RBC AUTO: 96.8 FL (ref 80–100)
PDW BLD-RTO: 14.5 % (ref 12.4–15.4)
PHOSPHORUS: 3.6 MG/DL (ref 2.5–4.9)
PLATELET # BLD: 211 K/UL (ref 135–450)
PMV BLD AUTO: 9.4 FL (ref 5–10.5)
POTASSIUM SERPL-SCNC: 4.1 MMOL/L (ref 3.5–5.1)
PROTEIN PROTEIN: 11 MG/DL
RBC # BLD: 4.91 M/UL (ref 4.2–5.9)
SODIUM BLD-SCNC: 142 MMOL/L (ref 136–145)
TOTAL CK: 294 U/L (ref 39–308)
WBC # BLD: 7.6 K/UL (ref 4–11)

## 2021-11-10 PROCEDURE — 82570 ASSAY OF URINE CREATININE: CPT

## 2021-11-10 PROCEDURE — 84156 ASSAY OF PROTEIN URINE: CPT

## 2021-11-10 PROCEDURE — 85027 COMPLETE CBC AUTOMATED: CPT

## 2021-11-10 PROCEDURE — 80069 RENAL FUNCTION PANEL: CPT

## 2021-11-10 PROCEDURE — 36415 COLL VENOUS BLD VENIPUNCTURE: CPT

## 2021-11-10 PROCEDURE — 83036 HEMOGLOBIN GLYCOSYLATED A1C: CPT

## 2021-11-10 PROCEDURE — 82550 ASSAY OF CK (CPK): CPT

## 2021-11-11 LAB
ESTIMATED AVERAGE GLUCOSE: 122.6 MG/DL
HBA1C MFR BLD: 5.9 %

## 2022-01-14 ENCOUNTER — TELEPHONE (OUTPATIENT)
Dept: FAMILY MEDICINE CLINIC | Age: 57
End: 2022-01-14

## 2022-01-14 ENCOUNTER — OFFICE VISIT (OUTPATIENT)
Dept: FAMILY MEDICINE CLINIC | Age: 57
End: 2022-01-14
Payer: COMMERCIAL

## 2022-01-14 VITALS — OXYGEN SATURATION: 96 % | TEMPERATURE: 100.2 F | HEART RATE: 117 BPM

## 2022-01-14 DIAGNOSIS — Z20.822 SUSPECTED COVID-19 VIRUS INFECTION: ICD-10-CM

## 2022-01-14 DIAGNOSIS — R50.9 FEVER, UNSPECIFIED FEVER CAUSE: Primary | ICD-10-CM

## 2022-01-14 LAB
INFLUENZA A ANTIGEN, POC: NORMAL
INFLUENZA B ANTIGEN, POC: NORMAL

## 2022-01-14 PROCEDURE — 87804 INFLUENZA ASSAY W/OPTIC: CPT | Performed by: FAMILY MEDICINE

## 2022-01-14 PROCEDURE — 99213 OFFICE O/P EST LOW 20 MIN: CPT | Performed by: FAMILY MEDICINE

## 2022-01-14 RX ORDER — HYDROCODONE POLISTIREX AND CHLORPHENIRAMINE POLISTIREX 10; 8 MG/5ML; MG/5ML
5 SUSPENSION, EXTENDED RELEASE ORAL EVERY 12 HOURS PRN
Qty: 115 ML | Refills: 0 | Status: SHIPPED | OUTPATIENT
Start: 2022-01-14 | End: 2022-01-14

## 2022-01-14 RX ORDER — GUAIFENESIN AND CODEINE PHOSPHATE 100; 10 MG/5ML; MG/5ML
5 SOLUTION ORAL 4 TIMES DAILY PRN
Qty: 180 ML | Refills: 0 | Status: SHIPPED | OUTPATIENT
Start: 2022-01-14 | End: 2022-01-23

## 2022-01-14 NOTE — TELEPHONE ENCOUNTER
----- Message from Jhonny Lind sent at 1/14/2022  5:15 PM EST -----  Subject: Message to Provider    QUESTIONS  Information for Provider? Pt had a script that was prescribe by Bo Nagel the pharmacy that it was sent to does not carry the medication pt   needs pcp to find another pharmacy to send the script to as soon as   possible   ---------------------------------------------------------------------------  --------------  [address]  What is the best way for the office to contact you? OK to leave message on   voicemail  Preferred Call Back Phone Number? [phone]  ---------------------------------------------------------------------------  --------------  SCRIPT ANSWERS  Relationship to Patient?  Self

## 2022-01-14 NOTE — PROGRESS NOTES
Chief Complaint   Patient presents with    Concern For COVID-19       Symptoms started on: 1/12    CAME HOME FEELING SICK  UP ALL NIGHT COUGHING AND HACKING UP PHLEGM  BODY ACHES  FEVER 102  HA  NASAL CONGESTION  SOB WITH COUGH BUT OK O/W      + WIFE AND GRANDSON - TESTED FOR COVID BUT NO RESULTS YET    + VAX X 2 AUGUST          Vitals:    01/14/22 1641   Pulse: 117   Temp: 100.2 °F (37.9 °C)   SpO2: 96%     Wt Readings from Last 3 Encounters:   11/15/21 253 lb (114.8 kg)   09/17/21 248 lb 9.6 oz (112.8 kg)   09/02/21 268 lb (121.6 kg)     There is no height or weight on file to calculate BMI. Alert and oriented x 4 NAD, affect appropriate and obese, well hydrated, well developed. Left TM NL, canal NL and pinna nl  Right TM NL, canal NL and pinna nl  OP mild erythema, no exudate, no swelling  Lung clear with good air movement and effort  CV RRR no M      ASSESSMENT AND PLAN:       Jemal Harding was seen today for concern for covid-19. Diagnoses and all orders for this visit:    Fever, unspecified fever cause  -     POCT Influenza A/B Antigen - NEGATIVE    Suspected COVID-19 virus infection  -     COVID-19  -     POCT Influenza A/B Antigen  -     guaiFENesin-codeine (TUSSI-ORGANIDIN NR) 100-10 MG/5ML syrup; Take 5 mLs by mouth 4 times daily as needed for Cough for up to 9 days. Likely viral infection causing symptoms. Continue symptomatic treatment. Call or return to office if worsening or not starting to improve after 7-10 days of symptoms. Discharged with instructions on management of symptoms and self - quarantine for possible Covid infection.    Work/school note given: no

## 2022-01-15 LAB — SARS-COV-2: DETECTED

## 2022-03-28 RX ORDER — AMLODIPINE BESYLATE 5 MG/1
TABLET ORAL
Qty: 90 TABLET | Refills: 1 | Status: SHIPPED | OUTPATIENT
Start: 2022-03-28 | End: 2022-10-04 | Stop reason: SDUPTHER

## 2022-04-09 ENCOUNTER — HOSPITAL ENCOUNTER (INPATIENT)
Age: 57
LOS: 1 days | Discharge: HOME | DRG: 660 | End: 2022-04-10
Attending: EMERGENCY MEDICINE | Admitting: HOSPITALIST
Payer: COMMERCIAL

## 2022-04-09 ENCOUNTER — APPOINTMENT (OUTPATIENT)
Dept: CT IMAGING | Age: 57
DRG: 660 | End: 2022-04-09
Payer: COMMERCIAL

## 2022-04-09 ENCOUNTER — APPOINTMENT (OUTPATIENT)
Dept: GENERAL RADIOLOGY | Age: 57
DRG: 660 | End: 2022-04-09
Payer: COMMERCIAL

## 2022-04-09 ENCOUNTER — ANESTHESIA (OUTPATIENT)
Dept: OPERATING ROOM | Age: 57
DRG: 660 | End: 2022-04-09
Payer: COMMERCIAL

## 2022-04-09 ENCOUNTER — ANESTHESIA EVENT (OUTPATIENT)
Dept: OPERATING ROOM | Age: 57
DRG: 660 | End: 2022-04-09
Payer: COMMERCIAL

## 2022-04-09 ENCOUNTER — NURSE TRIAGE (OUTPATIENT)
Dept: OTHER | Facility: CLINIC | Age: 57
End: 2022-04-09

## 2022-04-09 VITALS
DIASTOLIC BLOOD PRESSURE: 52 MMHG | OXYGEN SATURATION: 99 % | RESPIRATION RATE: 7 BRPM | SYSTOLIC BLOOD PRESSURE: 97 MMHG

## 2022-04-09 DIAGNOSIS — N17.9 AKI (ACUTE KIDNEY INJURY) (HCC): ICD-10-CM

## 2022-04-09 DIAGNOSIS — N20.0 KIDNEY STONE: Primary | ICD-10-CM

## 2022-04-09 LAB
A/G RATIO: 1.4 (ref 1.1–2.2)
ALBUMIN SERPL-MCNC: 4 G/DL (ref 3.4–5)
ALP BLD-CCNC: 57 U/L (ref 40–129)
ALT SERPL-CCNC: 34 U/L (ref 10–40)
ANION GAP SERPL CALCULATED.3IONS-SCNC: 11 MMOL/L (ref 3–16)
AST SERPL-CCNC: 36 U/L (ref 15–37)
BASOPHILS ABSOLUTE: 0.1 K/UL (ref 0–0.2)
BASOPHILS RELATIVE PERCENT: 0.5 %
BILIRUB SERPL-MCNC: 0.8 MG/DL (ref 0–1)
BILIRUBIN URINE: NEGATIVE
BLOOD, URINE: ABNORMAL
BUN BLDV-MCNC: 24 MG/DL (ref 7–20)
CALCIUM SERPL-MCNC: 8.9 MG/DL (ref 8.3–10.6)
CHLORIDE BLD-SCNC: 101 MMOL/L (ref 99–110)
CLARITY: CLEAR
CO2: 25 MMOL/L (ref 21–32)
COLOR: YELLOW
CREAT SERPL-MCNC: 1.8 MG/DL (ref 0.9–1.3)
CRYSTALS, UA: ABNORMAL /HPF
EOSINOPHILS ABSOLUTE: 0.1 K/UL (ref 0–0.6)
EOSINOPHILS RELATIVE PERCENT: 1.2 %
EPITHELIAL CELLS, UA: 0 /HPF (ref 0–5)
GFR AFRICAN AMERICAN: 48
GFR NON-AFRICAN AMERICAN: 39
GLUCOSE BLD-MCNC: 111 MG/DL (ref 70–99)
GLUCOSE BLD-MCNC: 165 MG/DL (ref 70–99)
GLUCOSE BLD-MCNC: 170 MG/DL (ref 70–99)
GLUCOSE BLD-MCNC: 172 MG/DL (ref 70–99)
GLUCOSE BLD-MCNC: 173 MG/DL (ref 70–99)
GLUCOSE URINE: NEGATIVE MG/DL
HCT VFR BLD CALC: 46 % (ref 40.5–52.5)
HEMOGLOBIN: 15.6 G/DL (ref 13.5–17.5)
HYALINE CASTS: 0 /LPF (ref 0–8)
KETONES, URINE: NEGATIVE MG/DL
LACTIC ACID, SEPSIS: 0.8 MMOL/L (ref 0.4–1.9)
LEUKOCYTE ESTERASE, URINE: NEGATIVE
LIPASE: 56 U/L (ref 13–60)
LYMPHOCYTES ABSOLUTE: 1.6 K/UL (ref 1–5.1)
LYMPHOCYTES RELATIVE PERCENT: 15.2 %
MCH RBC QN AUTO: 32.8 PG (ref 26–34)
MCHC RBC AUTO-ENTMCNC: 34 G/DL (ref 31–36)
MCV RBC AUTO: 96.5 FL (ref 80–100)
MICROSCOPIC EXAMINATION: YES
MONOCYTES ABSOLUTE: 1 K/UL (ref 0–1.3)
MONOCYTES RELATIVE PERCENT: 9.4 %
NEUTROPHILS ABSOLUTE: 7.8 K/UL (ref 1.7–7.7)
NEUTROPHILS RELATIVE PERCENT: 73.7 %
NITRITE, URINE: NEGATIVE
PDW BLD-RTO: 14.5 % (ref 12.4–15.4)
PERFORMED ON: ABNORMAL
PH UA: 5 (ref 5–8)
PLATELET # BLD: 205 K/UL (ref 135–450)
PMV BLD AUTO: 8.5 FL (ref 5–10.5)
POTASSIUM REFLEX MAGNESIUM: 4.2 MMOL/L (ref 3.5–5.1)
PROTEIN UA: ABNORMAL MG/DL
RBC # BLD: 4.76 M/UL (ref 4.2–5.9)
RBC UA: 5 /HPF (ref 0–4)
SODIUM BLD-SCNC: 137 MMOL/L (ref 136–145)
SPECIFIC GRAVITY UA: 1.02 (ref 1–1.03)
TOTAL PROTEIN: 6.9 G/DL (ref 6.4–8.2)
TROPONIN: <0.01 NG/ML
URINE REFLEX TO CULTURE: ABNORMAL
URINE TYPE: ABNORMAL
UROBILINOGEN, URINE: 0.2 E.U./DL
WBC # BLD: 10.6 K/UL (ref 4–11)
WBC UA: 6 /HPF (ref 0–5)

## 2022-04-09 PROCEDURE — 6360000002 HC RX W HCPCS: Performed by: PHYSICIAN ASSISTANT

## 2022-04-09 PROCEDURE — 99284 EMERGENCY DEPT VISIT MOD MDM: CPT

## 2022-04-09 PROCEDURE — 3700000000 HC ANESTHESIA ATTENDED CARE: Performed by: UROLOGY

## 2022-04-09 PROCEDURE — 2580000003 HC RX 258: Performed by: UROLOGY

## 2022-04-09 PROCEDURE — 6370000000 HC RX 637 (ALT 250 FOR IP): Performed by: PHYSICIAN ASSISTANT

## 2022-04-09 PROCEDURE — 2580000003 HC RX 258: Performed by: ANESTHESIOLOGY

## 2022-04-09 PROCEDURE — BT1F1ZZ FLUOROSCOPY OF LEFT KIDNEY, URETER AND BLADDER USING LOW OSMOLAR CONTRAST: ICD-10-PCS | Performed by: UROLOGY

## 2022-04-09 PROCEDURE — 93005 ELECTROCARDIOGRAM TRACING: CPT | Performed by: PHYSICIAN ASSISTANT

## 2022-04-09 PROCEDURE — 6360000002 HC RX W HCPCS: Performed by: HOSPITALIST

## 2022-04-09 PROCEDURE — 2060000000 HC ICU INTERMEDIATE R&B

## 2022-04-09 PROCEDURE — C2617 STENT, NON-COR, TEM W/O DEL: HCPCS | Performed by: UROLOGY

## 2022-04-09 PROCEDURE — 6370000000 HC RX 637 (ALT 250 FOR IP): Performed by: HOSPITALIST

## 2022-04-09 PROCEDURE — 94760 N-INVAS EAR/PLS OXIMETRY 1: CPT

## 2022-04-09 PROCEDURE — 36415 COLL VENOUS BLD VENIPUNCTURE: CPT

## 2022-04-09 PROCEDURE — 6360000002 HC RX W HCPCS: Performed by: ANESTHESIOLOGY

## 2022-04-09 PROCEDURE — 6360000004 HC RX CONTRAST MEDICATION: Performed by: UROLOGY

## 2022-04-09 PROCEDURE — 6360000002 HC RX W HCPCS: Performed by: UROLOGY

## 2022-04-09 PROCEDURE — 83690 ASSAY OF LIPASE: CPT

## 2022-04-09 PROCEDURE — 2580000003 HC RX 258: Performed by: HOSPITALIST

## 2022-04-09 PROCEDURE — 83605 ASSAY OF LACTIC ACID: CPT

## 2022-04-09 PROCEDURE — 96375 TX/PRO/DX INJ NEW DRUG ADDON: CPT

## 2022-04-09 PROCEDURE — 0T778DZ DILATION OF LEFT URETER WITH INTRALUMINAL DEVICE, VIA NATURAL OR ARTIFICIAL OPENING ENDOSCOPIC: ICD-10-PCS | Performed by: UROLOGY

## 2022-04-09 PROCEDURE — 2709999900 HC NON-CHARGEABLE SUPPLY: Performed by: UROLOGY

## 2022-04-09 PROCEDURE — 3700000001 HC ADD 15 MINUTES (ANESTHESIA): Performed by: UROLOGY

## 2022-04-09 PROCEDURE — 84484 ASSAY OF TROPONIN QUANT: CPT

## 2022-04-09 PROCEDURE — C1769 GUIDE WIRE: HCPCS | Performed by: UROLOGY

## 2022-04-09 PROCEDURE — 96361 HYDRATE IV INFUSION ADD-ON: CPT

## 2022-04-09 PROCEDURE — 83036 HEMOGLOBIN GLYCOSYLATED A1C: CPT

## 2022-04-09 PROCEDURE — 3600000014 HC SURGERY LEVEL 4 ADDTL 15MIN: Performed by: UROLOGY

## 2022-04-09 PROCEDURE — 81001 URINALYSIS AUTO W/SCOPE: CPT

## 2022-04-09 PROCEDURE — 2580000003 HC RX 258: Performed by: PHYSICIAN ASSISTANT

## 2022-04-09 PROCEDURE — 74420 UROGRAPHY RTRGR +-KUB: CPT

## 2022-04-09 PROCEDURE — 87040 BLOOD CULTURE FOR BACTERIA: CPT

## 2022-04-09 PROCEDURE — 2500000003 HC RX 250 WO HCPCS: Performed by: ANESTHESIOLOGY

## 2022-04-09 PROCEDURE — 96374 THER/PROPH/DIAG INJ IV PUSH: CPT

## 2022-04-09 PROCEDURE — 6370000000 HC RX 637 (ALT 250 FOR IP): Performed by: UROLOGY

## 2022-04-09 PROCEDURE — 80053 COMPREHEN METABOLIC PANEL: CPT

## 2022-04-09 PROCEDURE — 7100000000 HC PACU RECOVERY - FIRST 15 MIN: Performed by: UROLOGY

## 2022-04-09 PROCEDURE — 3600000004 HC SURGERY LEVEL 4 BASE: Performed by: UROLOGY

## 2022-04-09 PROCEDURE — 2700000000 HC OXYGEN THERAPY PER DAY

## 2022-04-09 PROCEDURE — 7100000001 HC PACU RECOVERY - ADDTL 15 MIN: Performed by: UROLOGY

## 2022-04-09 PROCEDURE — 74176 CT ABD & PELVIS W/O CONTRAST: CPT

## 2022-04-09 PROCEDURE — 85025 COMPLETE CBC W/AUTO DIFF WBC: CPT

## 2022-04-09 DEVICE — URETERAL STENT
Type: IMPLANTABLE DEVICE | Site: URETER | Status: FUNCTIONAL
Brand: PERCUFLEX™ PLUS

## 2022-04-09 RX ORDER — PROPOFOL 10 MG/ML
INJECTION, EMULSION INTRAVENOUS PRN
Status: DISCONTINUED | OUTPATIENT
Start: 2022-04-09 | End: 2022-04-09 | Stop reason: SDUPTHER

## 2022-04-09 RX ORDER — MIDAZOLAM HYDROCHLORIDE 1 MG/ML
INJECTION INTRAMUSCULAR; INTRAVENOUS PRN
Status: DISCONTINUED | OUTPATIENT
Start: 2022-04-09 | End: 2022-04-09 | Stop reason: SDUPTHER

## 2022-04-09 RX ORDER — ONDANSETRON 2 MG/ML
4 INJECTION INTRAMUSCULAR; INTRAVENOUS ONCE
Status: COMPLETED | OUTPATIENT
Start: 2022-04-09 | End: 2022-04-09

## 2022-04-09 RX ORDER — 0.9 % SODIUM CHLORIDE 0.9 %
1000 INTRAVENOUS SOLUTION INTRAVENOUS ONCE
Status: COMPLETED | OUTPATIENT
Start: 2022-04-09 | End: 2022-04-09

## 2022-04-09 RX ORDER — ONDANSETRON 2 MG/ML
INJECTION INTRAMUSCULAR; INTRAVENOUS PRN
Status: DISCONTINUED | OUTPATIENT
Start: 2022-04-09 | End: 2022-04-09 | Stop reason: SDUPTHER

## 2022-04-09 RX ORDER — HYDROMORPHONE HCL 110MG/55ML
0.5 PATIENT CONTROLLED ANALGESIA SYRINGE INTRAVENOUS EVERY 5 MIN PRN
Status: DISCONTINUED | OUTPATIENT
Start: 2022-04-09 | End: 2022-04-09 | Stop reason: HOSPADM

## 2022-04-09 RX ORDER — SODIUM CHLORIDE 9 MG/ML
INJECTION, SOLUTION INTRAVENOUS CONTINUOUS PRN
Status: DISCONTINUED | OUTPATIENT
Start: 2022-04-09 | End: 2022-04-09 | Stop reason: SDUPTHER

## 2022-04-09 RX ORDER — FAMOTIDINE 20 MG/1
20 TABLET, FILM COATED ORAL DAILY PRN
Status: DISCONTINUED | OUTPATIENT
Start: 2022-04-09 | End: 2022-04-10 | Stop reason: HOSPADM

## 2022-04-09 RX ORDER — MAGNESIUM HYDROXIDE 1200 MG/15ML
LIQUID ORAL
Status: COMPLETED | OUTPATIENT
Start: 2022-04-09 | End: 2022-04-09

## 2022-04-09 RX ORDER — ONDANSETRON 4 MG/1
4 TABLET, ORALLY DISINTEGRATING ORAL EVERY 8 HOURS PRN
Status: DISCONTINUED | OUTPATIENT
Start: 2022-04-09 | End: 2022-04-10 | Stop reason: HOSPADM

## 2022-04-09 RX ORDER — DEXTROSE MONOHYDRATE 50 MG/ML
100 INJECTION, SOLUTION INTRAVENOUS PRN
Status: DISCONTINUED | OUTPATIENT
Start: 2022-04-09 | End: 2022-04-10 | Stop reason: HOSPADM

## 2022-04-09 RX ORDER — ACETAMINOPHEN 650 MG/1
650 SUPPOSITORY RECTAL EVERY 6 HOURS PRN
Status: DISCONTINUED | OUTPATIENT
Start: 2022-04-09 | End: 2022-04-10 | Stop reason: HOSPADM

## 2022-04-09 RX ORDER — FENTANYL CITRATE 50 UG/ML
INJECTION, SOLUTION INTRAMUSCULAR; INTRAVENOUS PRN
Status: DISCONTINUED | OUTPATIENT
Start: 2022-04-09 | End: 2022-04-09 | Stop reason: SDUPTHER

## 2022-04-09 RX ORDER — TAMSULOSIN HYDROCHLORIDE 0.4 MG/1
0.4 CAPSULE ORAL DAILY
Status: DISCONTINUED | OUTPATIENT
Start: 2022-04-09 | End: 2022-04-10 | Stop reason: HOSPADM

## 2022-04-09 RX ORDER — ONDANSETRON 2 MG/ML
4 INJECTION INTRAMUSCULAR; INTRAVENOUS
Status: DISCONTINUED | OUTPATIENT
Start: 2022-04-09 | End: 2022-04-09 | Stop reason: HOSPADM

## 2022-04-09 RX ORDER — SODIUM CHLORIDE 0.9 % (FLUSH) 0.9 %
5-40 SYRINGE (ML) INJECTION EVERY 12 HOURS SCHEDULED
Status: DISCONTINUED | OUTPATIENT
Start: 2022-04-09 | End: 2022-04-10 | Stop reason: HOSPADM

## 2022-04-09 RX ORDER — LIDOCAINE HYDROCHLORIDE 10 MG/ML
INJECTION, SOLUTION INFILTRATION; PERINEURAL PRN
Status: DISCONTINUED | OUTPATIENT
Start: 2022-04-09 | End: 2022-04-09 | Stop reason: SDUPTHER

## 2022-04-09 RX ORDER — POTASSIUM CHLORIDE 20 MEQ/1
40 TABLET, EXTENDED RELEASE ORAL PRN
Status: DISCONTINUED | OUTPATIENT
Start: 2022-04-09 | End: 2022-04-10 | Stop reason: HOSPADM

## 2022-04-09 RX ORDER — OXYCODONE HYDROCHLORIDE 5 MG/1
5 TABLET ORAL
Status: DISCONTINUED | OUTPATIENT
Start: 2022-04-09 | End: 2022-04-09 | Stop reason: HOSPADM

## 2022-04-09 RX ORDER — ALBUTEROL SULFATE 90 UG/1
2 AEROSOL, METERED RESPIRATORY (INHALATION) EVERY 6 HOURS PRN
Status: DISCONTINUED | OUTPATIENT
Start: 2022-04-09 | End: 2022-04-10 | Stop reason: HOSPADM

## 2022-04-09 RX ORDER — ONDANSETRON 2 MG/ML
4 INJECTION INTRAMUSCULAR; INTRAVENOUS EVERY 6 HOURS PRN
Status: DISCONTINUED | OUTPATIENT
Start: 2022-04-09 | End: 2022-04-10 | Stop reason: HOSPADM

## 2022-04-09 RX ORDER — MEPERIDINE HYDROCHLORIDE 25 MG/ML
12.5 INJECTION INTRAMUSCULAR; INTRAVENOUS; SUBCUTANEOUS EVERY 5 MIN PRN
Status: DISCONTINUED | OUTPATIENT
Start: 2022-04-09 | End: 2022-04-09 | Stop reason: HOSPADM

## 2022-04-09 RX ORDER — HYDROMORPHONE HYDROCHLORIDE 2 MG/1
1 TABLET ORAL EVERY 4 HOURS PRN
Status: DISCONTINUED | OUTPATIENT
Start: 2022-04-09 | End: 2022-04-10 | Stop reason: HOSPADM

## 2022-04-09 RX ORDER — AMLODIPINE BESYLATE 5 MG/1
5 TABLET ORAL DAILY
Status: DISCONTINUED | OUTPATIENT
Start: 2022-04-09 | End: 2022-04-10 | Stop reason: HOSPADM

## 2022-04-09 RX ORDER — MORPHINE SULFATE 4 MG/ML
4 INJECTION, SOLUTION INTRAMUSCULAR; INTRAVENOUS ONCE
Status: COMPLETED | OUTPATIENT
Start: 2022-04-09 | End: 2022-04-09

## 2022-04-09 RX ORDER — NICOTINE POLACRILEX 4 MG
15 LOZENGE BUCCAL PRN
Status: DISCONTINUED | OUTPATIENT
Start: 2022-04-09 | End: 2022-04-10 | Stop reason: HOSPADM

## 2022-04-09 RX ORDER — ACETAMINOPHEN 325 MG/1
650 TABLET ORAL EVERY 6 HOURS PRN
Status: DISCONTINUED | OUTPATIENT
Start: 2022-04-09 | End: 2022-04-10 | Stop reason: HOSPADM

## 2022-04-09 RX ORDER — LABETALOL HYDROCHLORIDE 5 MG/ML
10 INJECTION, SOLUTION INTRAVENOUS
Status: DISCONTINUED | OUTPATIENT
Start: 2022-04-09 | End: 2022-04-09 | Stop reason: HOSPADM

## 2022-04-09 RX ORDER — POLYETHYLENE GLYCOL 3350 17 G/17G
17 POWDER, FOR SOLUTION ORAL DAILY PRN
Status: DISCONTINUED | OUTPATIENT
Start: 2022-04-09 | End: 2022-04-10 | Stop reason: HOSPADM

## 2022-04-09 RX ORDER — SODIUM CHLORIDE 9 MG/ML
INJECTION, SOLUTION INTRAVENOUS PRN
Status: DISCONTINUED | OUTPATIENT
Start: 2022-04-09 | End: 2022-04-10 | Stop reason: HOSPADM

## 2022-04-09 RX ORDER — DEXTROSE MONOHYDRATE 100 MG/ML
12.5 INJECTION, SOLUTION INTRAVENOUS PRN
Status: DISCONTINUED | OUTPATIENT
Start: 2022-04-09 | End: 2022-04-10 | Stop reason: HOSPADM

## 2022-04-09 RX ORDER — SODIUM CHLORIDE 9 MG/ML
INJECTION, SOLUTION INTRAVENOUS CONTINUOUS
Status: DISCONTINUED | OUTPATIENT
Start: 2022-04-09 | End: 2022-04-10 | Stop reason: HOSPADM

## 2022-04-09 RX ORDER — SODIUM CHLORIDE 0.9 % (FLUSH) 0.9 %
5-40 SYRINGE (ML) INJECTION PRN
Status: DISCONTINUED | OUTPATIENT
Start: 2022-04-09 | End: 2022-04-10 | Stop reason: HOSPADM

## 2022-04-09 RX ORDER — METOCLOPRAMIDE HYDROCHLORIDE 5 MG/ML
INJECTION INTRAMUSCULAR; INTRAVENOUS PRN
Status: DISCONTINUED | OUTPATIENT
Start: 2022-04-09 | End: 2022-04-09 | Stop reason: SDUPTHER

## 2022-04-09 RX ORDER — POTASSIUM CHLORIDE 7.45 MG/ML
10 INJECTION INTRAVENOUS PRN
Status: DISCONTINUED | OUTPATIENT
Start: 2022-04-09 | End: 2022-04-10 | Stop reason: HOSPADM

## 2022-04-09 RX ORDER — HYDRALAZINE HYDROCHLORIDE 20 MG/ML
10 INJECTION INTRAMUSCULAR; INTRAVENOUS
Status: DISCONTINUED | OUTPATIENT
Start: 2022-04-09 | End: 2022-04-09 | Stop reason: HOSPADM

## 2022-04-09 RX ORDER — ASPIRIN 81 MG/1
81 TABLET ORAL DAILY
Status: DISCONTINUED | OUTPATIENT
Start: 2022-04-09 | End: 2022-04-10 | Stop reason: HOSPADM

## 2022-04-09 RX ADMIN — TAMSULOSIN HYDROCHLORIDE 0.4 MG: 0.4 CAPSULE ORAL at 18:28

## 2022-04-09 RX ADMIN — AMLODIPINE BESYLATE 5 MG: 5 TABLET ORAL at 18:28

## 2022-04-09 RX ADMIN — SODIUM CHLORIDE 1000 ML: 9 INJECTION, SOLUTION INTRAVENOUS at 11:05

## 2022-04-09 RX ADMIN — MIDAZOLAM 2 MG: 1 INJECTION INTRAMUSCULAR; INTRAVENOUS at 15:19

## 2022-04-09 RX ADMIN — MORPHINE SULFATE 4 MG: 4 INJECTION INTRAVENOUS at 11:38

## 2022-04-09 RX ADMIN — SODIUM CHLORIDE: 9 INJECTION, SOLUTION INTRAVENOUS at 16:46

## 2022-04-09 RX ADMIN — Medication 1000 MG: at 18:28

## 2022-04-09 RX ADMIN — METOCLOPRAMIDE 10 MG: 5 INJECTION, SOLUTION INTRAMUSCULAR; INTRAVENOUS at 15:19

## 2022-04-09 RX ADMIN — LIDOCAINE HYDROCHLORIDE: 20 SOLUTION ORAL; TOPICAL at 10:54

## 2022-04-09 RX ADMIN — CEFAZOLIN SODIUM 2000 MG: 10 INJECTION, POWDER, FOR SOLUTION INTRAVENOUS at 15:11

## 2022-04-09 RX ADMIN — INSULIN LISPRO 1 UNITS: 100 INJECTION, SOLUTION INTRAVENOUS; SUBCUTANEOUS at 18:28

## 2022-04-09 RX ADMIN — SODIUM CHLORIDE: 9 INJECTION, SOLUTION INTRAVENOUS at 15:15

## 2022-04-09 RX ADMIN — ONDANSETRON 4 MG: 2 INJECTION INTRAMUSCULAR; INTRAVENOUS at 15:19

## 2022-04-09 RX ADMIN — ENOXAPARIN SODIUM 40 MG: 40 INJECTION SUBCUTANEOUS at 18:28

## 2022-04-09 RX ADMIN — LIDOCAINE HYDROCHLORIDE 50 MG: 10 INJECTION, SOLUTION INFILTRATION; PERINEURAL at 15:19

## 2022-04-09 RX ADMIN — ASPIRIN 81 MG: 81 TABLET, COATED ORAL at 18:28

## 2022-04-09 RX ADMIN — ONDANSETRON 4 MG: 2 INJECTION INTRAMUSCULAR; INTRAVENOUS at 10:41

## 2022-04-09 RX ADMIN — PROPOFOL 300 MG: 10 INJECTION, EMULSION INTRAVENOUS at 15:19

## 2022-04-09 RX ADMIN — SODIUM CHLORIDE: 9 INJECTION, SOLUTION INTRAVENOUS at 23:17

## 2022-04-09 RX ADMIN — FENTANYL CITRATE 100 MCG: 50 INJECTION, SOLUTION INTRAMUSCULAR; INTRAVENOUS at 15:19

## 2022-04-09 ASSESSMENT — PAIN SCALES - GENERAL
PAINLEVEL_OUTOF10: 3
PAINLEVEL_OUTOF10: 1
PAINLEVEL_OUTOF10: 0
PAINLEVEL_OUTOF10: 0
PAINLEVEL_OUTOF10: 2

## 2022-04-09 ASSESSMENT — LIFESTYLE VARIABLES: SMOKING_STATUS: 0

## 2022-04-09 ASSESSMENT — PULMONARY FUNCTION TESTS
PIF_VALUE: 15
PIF_VALUE: 1
PIF_VALUE: 1
PIF_VALUE: 10
PIF_VALUE: 25
PIF_VALUE: 3
PIF_VALUE: 4
PIF_VALUE: 9
PIF_VALUE: 0
PIF_VALUE: 16
PIF_VALUE: 5
PIF_VALUE: 3
PIF_VALUE: 1
PIF_VALUE: 1
PIF_VALUE: 23
PIF_VALUE: 22
PIF_VALUE: 1
PIF_VALUE: 16

## 2022-04-09 ASSESSMENT — PAIN DESCRIPTION - PAIN TYPE: TYPE: SURGICAL PAIN

## 2022-04-09 ASSESSMENT — PAIN DESCRIPTION - LOCATION: LOCATION: OTHER (COMMENT)

## 2022-04-09 ASSESSMENT — PAIN - FUNCTIONAL ASSESSMENT
PAIN_FUNCTIONAL_ASSESSMENT: 0-10
PAIN_FUNCTIONAL_ASSESSMENT: ACTIVITIES ARE NOT PREVENTED
PAIN_FUNCTIONAL_ASSESSMENT: 0-10

## 2022-04-09 ASSESSMENT — PAIN DESCRIPTION - DESCRIPTORS: DESCRIPTORS: BURNING

## 2022-04-09 ASSESSMENT — PAIN DESCRIPTION - FREQUENCY: FREQUENCY: INTERMITTENT

## 2022-04-09 ASSESSMENT — PAIN DESCRIPTION - ONSET: ONSET: GRADUAL

## 2022-04-09 NOTE — H&P
Hospital Medicine History & Physical      PCP: Kori Helm MD    Date of Admission: 4/9/2022    Date of Service: Pt seen/examined on 4/9/2022 and Admitted to Inpatient with expected LOS greater than two midnights due to medical therapy. Chief Complaint: Nausea vomiting and abdominal pain      History Of Present Illness:      49 y.o. male who presented to [facility] emergency room with chief complaint of nausea vomiting, bilateral abdominal cramping pain radiating to back it started on Thursday afternoon patient denies any fever complains of chills complains of nausea vomiting 3-4 times per day with a decreased p.o. intake denies any chest pain no shortness of breath no cough no sputum no diarrhea no melena no hematochezia no hematuria no dysuria no recent weight loss weight gain no change in appetite he does not smoke he drinks alcohol occasionally. In the emergency room patient was noted to have acute kidney injury, CT of the abdomen and pelvis-  Left obstructive uropathy related to an 8 mm stone in the left mid ureter.         Urology was consulted plan for cystoscopy and stent today. Past medical history of hypertension, diabetes mellitus type 2.       Past Medical History:          Diagnosis Date    Diabetes mellitus ([address]     Hypertension     Obesity, unspecified     MARIA A on CPAP     Other abnormal blood chemistry     Seasonal allergies     Unspecified sleep apnea        Past Surgical History:          Procedure Laterality Date    CATARACT REMOVAL WITH IMPLANT  2009, 2010    B eyes    CYSTOSCOPY Left 8/24/2021    CYSTOSCOPY, LEFT RETROGRADE PYELOGRAM, PLACEMENT OF LEFT URETERAL STENT performed by Charlotte Cerda MD at [facility] 9/2/2021    CYSTOSCOPY WITH LEFT URETEROSCOPY WITH HOLMIUM LASER LITHOTRIPSY, STONE MANIPULATION AND LEFT STENT REMOVAL performed by Charlotte Cerda MD at [facility]  08/2018       Medications Prior to Admission:      Prior to Admission medications    Medication Sig Start Date End Date Taking? Authorizing Provider   amLODIPine (NORVASC) 5 MG tablet TAKE 1 TABLET BY MOUTH EVERY DAY 3/28/22   Mark Kamara MD   glimepiride (AMARYL) 2 MG tablet TAKE 1 TABLET BY MOUTH EVERY DAY IN THE MORNING 10/4/21   Mark Kmaara MD   azelastine (ASTELIN) 0.1 % nasal spray 1 spray by Nasal route 2 times daily Use in each nostril as directed 6/30/21   Mark Kamara MD   aspirin 81 MG EC tablet Take 81 mg by mouth daily    Historical Provider, MD   hyoscyamine (LEVSIN) 125 MCG tablet Take 1 tablet by mouth every 4 hours as needed for Cramping 4/2/19   Rajiv Barr MD   albuterol sulfate HFA (PROAIR HFA) 108 (90 Base) MCG/ACT inhaler Inhale 2 puffs into the lungs 4 times daily as needed for Wheezing 5/7/18   Mark Kamara MD   glucose blood VI test strips (ONE TOUCH ULTRA TEST) strip Tests once daily 8/12/14   Mark Kamara MD   Blood Glucose Monitoring Suppl (ONE TOUCH ULTRA) by Does not apply route. Historical Provider, MD   famotidine (PEPCID) 20 MG tablet Take 20 mg by mouth daily. Historical Provider, MD       Allergies:  Ace inhibitors and Pollen extract    Social History:      The patient currently lives with his wife. TOBACCO:   reports that he has never smoked. He has never used smokeless tobacco.  ETOH:   reports current alcohol use. E-Cigarettes/Vaping Use     Questions Responses    E-Cigarette/Vaping Use Never User    Start Date     Passive Exposure     Quit Date     Counseling Given     Comments             Family History:       Reviewed in detail and negative for DM, CAD, Cancer, CVA. Positive as follows:        Problem Relation Age of Onset    High Blood Pressure Father     Other Father     Arthritis Mother     Other Mother     Kidney stones Mother        REVIEW OF SYSTEMS COMPLETED:   Pertinent positives as noted in the HPI.  All other systems reviewed and negative. PHYSICAL EXAM PERFORMED:    BP (!) 152/93   Pulse 92   Temp 99 °F (37.2 °C) (Oral)   Resp 24   Ht 5' 8.5\" (1.74 m)   Wt 250 lb (113.4 kg)   SpO2 97%   BMI 37.46 kg/m²     General appearance: Ill-appearing and mild to moderate distress, appears stated age and cooperative. HEENT:  Normal cephalic, atraumatic without obvious deformity. Pupils equal, round, and reactive to light. Extra ocular muscles intact. Conjunctivae/corneas clear. Neck: Supple, with full range of motion. No jugular venous distention. Trachea midline. Respiratory:  Normal respiratory effort. Clear to auscultation, bilaterally without Rales/Wheezes/Rhonchi. Cardiovascular:  Regular rate and rhythm with normal S1/S2 without murmurs, rubs or gallops. Abdomen: Soft, non-tender, non-distended with normal bowel sounds. Musculoskeletal:  No clubbing, cyanosis or edema bilaterally. Full range of motion without deformity. Skin: Skin color, texture, turgor normal.  No rashes or lesions. Neurologic:  Neurovascularly intact without any focal sensory/motor deficits. Cranial nerves: II-XII intact, grossly non-focal.  Psychiatric:  Alert and oriented, thought content appropriate, normal insight  Capillary Refill: Brisk,3 seconds, normal  Peripheral Pulses: +2 palpable, equal bilaterally       Labs:     Recent Labs     04/09/22  1039   WBC 10.6   HGB 15.6   HCT 46.0        Recent Labs     04/09/22  1039      K 4.2      CO2 25   BUN 24*   CREATININE 1.8*   CALCIUM 8.9     Recent Labs     04/09/22  1039   AST 36   ALT 34   BILITOT 0.8   ALKPHOS 57     No results for input(s): INR in the last 72 hours.   Recent Labs     04/09/22  1039   TROPONINI <0.01       Urinalysis:      Lab Results   Component Value Date    NITRU Negative 04/09/2022    WBCUA 6 04/09/2022    BACTERIA neg 09/25/2020    RBCUA 5 04/09/2022    BLOODU LARGE 04/09/2022    SPECGRAV 1.020 04/09/2022    GLUCOSEU Negative 04/09/2022       Radiology:     CXR: I have reviewed the CXR with the following interpretation:   EKG:  I have reviewed the EKG with the following interpretation:     CT ABDOMEN PELVIS WO CONTRAST Additional Contrast? None   Final Result   Left obstructive uropathy related to an 8 mm stone in the left mid ureter. ASSESSMENT:    Active Hospital Problems    Diagnosis Date Noted    Nephrolithiasis [N20.0] 08/23/2021   Acute kidney injury  Hypertension  Diabetes mellitus type 2      PLAN:    1. This is a 29-year-old male admitted with the nausea vomiting abdominal pain diagnosis of left obstructive uropathy related to an 8 mm stone in the left mid ureter, will admit patient keep him n.p.o. continue with IV hydration. Add Rocephin for prophylaxis blood culture collected in the emergency room. Urology consulted by ER physician for cystoscopy and stent today. Dilaudid as needed IV for pain management. 2.  Acute kidney injury creatinine of 1.8 start him on IV fluid check daily BMP. 3.  Hypertension continue with home medication monitor blood pressure closely hold antihypertensive medication for systolic blood pressure less than 120.  4.  Diabetes mellitus type 2 hold p.o. medication Accu-Chek and cover with a low sliding scale check hemoglobin A1c. DVT Prophylaxis: Lovenox subcu  Diet: N.p.o.  Code Status: Full code    PT/OT Eval Status: Not indicated    Eloisa Lazar MD    Thank you Narda Mobley MD for the opportunity to be involved in this patient's care. If you have any questions or concerns please feel free to contact me at [phone].

## 2022-04-09 NOTE — PROGRESS NOTES
Wife and pt state that pt's belongings are in a PACU locker, this RN and security looked for the belongings, locked lockers were found, no key to access, clinical notified if anyone were to be down there with access to those lockers to let [employer] know

## 2022-04-09 NOTE — OP NOTE
Urology Operative Report  [facility]    Provider: Parvin Navas MD MD Patient ID:  Admission Date: 2022 Name: Kenneth Bailey Date: 2022  MRN: [id number]   Patient Location: OR/NONE : 1965  Attending: Corey Glaindo MD Date of Service: 2022  PCP: Malgorzata Carranza MD     Date of Operation: 2022    Preoperative Diagnosis: LEFT side upper ureteral stone    Postoperative Diagnosis: same    Procedure:    1. Cystoscopy  2. Left side ureteral stent placement  3. Fluoroscopic imaging < 1 hr physician time  4. LEFT side retrograde ureteropyelogram    Surgeon:   Parvin Navas MD, MD    Anesthesia: General endotracheal anesthesia and General LMA anesthesia    Indications: Raul Sanchez is a 49 y.o. male who presents for the above named surgery. Informed consent was obtained and the risks, benefits, and details of the procedure were explained to the patient who elected to proceed. Details of Procedure: The patient was brought to the operating room and placed in the supine position on the operating room table. SCDs were placed on the lower extremities. Following induction of anesthesia the patient was positioned in a lithotomy position, all pressure points were padded, and the genitals were prepped and draped in the usual sterile fashion. A routine timeout was performed, confirming the patient, procedure, site, risk of fire, patient allergies and confirming that preoperative antibiotics had been administered prior to beginning. A 21 fr rigid cystoscope was advance via a normal appearing urethra into the bladder. The bladder was inspected and there were no suspicious lesions, stones or diverticula seen. Attention was turned to the left ureteral orifice. A 0.035 sensor wire was advance into the left UO and up to the renal pelvis under control of fluoroscopy. There was slight resistance felt at the expected level of the stone based on the CT scan.  Over the wire a 5 fr [place] catheter was positioned proximal to the level of the stone. The wire was removed and a RUPG was done. The wire was replaced proximal to the stone, and the Ashlee Been was removed. Over the wire a stent was advanced under control of fluoroscopy with good curl in the renal pelvis and the urinary bladder. The bladder was emptied and the scope removed    At the end of the procedure all counts were correct. The patient tolerated the procedure well and was transported to the PACU in stable condition. Findings: successful LEFT sided ureteral stent placement for an impacted left ureteral stone. Continue with plans for admission. He will need a future LEFT sided ureteroscopy for stone and stent management. Estimated Blood Loss: minimal                  Drains: 4.8 fr x 26cm left ureteral stent, good length          Specimens: none    Complications: none apparent           Disposition:  PACU - hemodynamically stable.             Jose Alberto Fields MD, MD  4/9/2022

## 2022-04-09 NOTE — ED PROVIDER NOTES
[facility]        Pt Name: Aaron Lynch  MRN: [id number]  [place] 1965  Date of evaluation: 4/9/2022  Provider: CHADD De Jesus  PCP: Pelon Welch MD  Note Started: 10:32 AM EDT        I have seen and evaluated this patient with my supervising physician Kevin Cross MD.    [address] [place] [address]       Chief Complaint   Patient presents with    Abdominal Pain     n/v and abd cramping x \"few days\"       HISTORY OF PRESENT ILLNESS   (Location, Timing/Onset, Context/Setting, Quality, Duration, Modifying Factors, Severity, Associated Signs and Symptoms)  Note limiting factors. Chief Complaint: epigastric abdominal pain, nausea, vomiting     Aaron Lynch is a 49 y.o. male who presents left-sided abdominal pain for the past 3 days. Patient states that he has had a history of kidney stones and states that this pain is similar presentation. Patient has any fevers, chills, nausea or vomiting. Patient states that the pain is more on the left lower abdomen and up into the left upper abdomen at times. Patient is concerned that he has an active kidney stone. Nursing Notes were all reviewed and agreed with or any disagreements were addressed in the HPI. REVIEW OF SYSTEMS    (2-9 systems for level 4, 10 or more for level 5)     Review of Systems    Positives and Pertinent negatives as per HPI. Except as noted above in the ROS, all other systems were reviewed and negative.        PAST MEDICAL HISTORY     Past Medical History:   Diagnosis Date    Diabetes mellitus ([address]     Hypertension     Obesity, unspecified     MARIA A on CPAP     Other abnormal blood chemistry     Seasonal allergies     Unspecified sleep apnea          SURGICAL HISTORY     Past Surgical History:   Procedure Laterality Date    CATARACT REMOVAL WITH IMPLANT  2009, 2010    B eyes    CYSTOSCOPY Left 8/24/2021    CYSTOSCOPY, LEFT RETROGRADE PYELOGRAM, PLACEMENT OF LEFT URETERAL STENT performed by Alejandro Cates MD at [facility] 2021    CYSTOSCOPY WITH LEFT URETEROSCOPY WITH HOLMIUM LASER LITHOTRIPSY, STONE MANIPULATION AND LEFT STENT REMOVAL performed by Alejandro Cates MD at [facility]  2018         [address]       Current Discharge Medication List      CONTINUE these medications which have NOT CHANGED    Details   amLODIPine (NORVASC) 5 MG tablet TAKE 1 TABLET BY MOUTH EVERY DAY  Qty: 90 tablet, Refills: 1      glimepiride (AMARYL) 2 MG tablet TAKE 1 TABLET BY MOUTH EVERY DAY IN THE MORNING  Qty: 90 tablet, Refills: 1      azelastine (ASTELIN) 0.1 % nasal spray 1 spray by Nasal route 2 times daily Use in each nostril as directed  Qty: 1 Bottle, Refills: 5      aspirin 81 MG EC tablet Take 81 mg by mouth daily      hyoscyamine (LEVSIN) 125 MCG tablet Take 1 tablet by mouth every 4 hours as needed for Cramping  Qty: 50 tablet, Refills: 3    Associated Diagnoses: Non-intractable vomiting with nausea, unspecified vomiting type; Abdominal cramping      albuterol sulfate HFA (PROAIR HFA) 108 (90 Base) MCG/ACT inhaler Inhale 2 puffs into the lungs 4 times daily as needed for Wheezing  Qty: 3 Inhaler, Refills: 2      glucose blood VI test strips (ONE TOUCH ULTRA TEST) strip Tests once daily  Qty: 100 each, Refills: 3      Blood Glucose Monitoring Suppl (ONE TOUCH ULTRA) by Does not apply route. famotidine (PEPCID) 20 MG tablet Take 20 mg by mouth daily.                  ALLERGIES     Ace inhibitors and Pollen extract    FAMILYHISTORY       Family History   Problem Relation Age of Onset    High Blood Pressure Father     Other Father     Arthritis Mother     Other Mother     Kidney stones Mother           SOCIAL HISTORY       Social History     Tobacco Use    Smoking status: Never Smoker    Smokeless tobacco: Never Used   Vaping Use    Vaping Use: Never used   Substance Use Topics    Alcohol use: Yes     Comment: socially    Drug use: No       SCREENINGS    Delfin Coma Scale  Eye Opening: Spontaneous  Best Verbal Response: Oriented  Best Motor Response: Obeys commands  Delfin Coma Scale Score: 15        PHYSICAL EXAM    (up to 7 for level 4, 8 or more for level 5)     ED Triage Vitals   BP Temp Temp Source Pulse Resp SpO2 Height Weight   04/09/22 1019 04/09/22 1018 04/09/22 1018 04/09/22 1019 04/09/22 1019 04/09/22 1019 04/09/22 1018 04/09/22 1018   (!) 166/95 99 °F (37.2 °C) Oral 100 16 98 % 5' 8.5\" (1.74 m) 250 lb (113.4 kg)       Physical Exam  Constitutional:       General: He is not in acute distress. Appearance: He is well-developed. He is obese. He is not ill-appearing. HENT:      Head: Normocephalic. Mouth/Throat:      Mouth: Mucous membranes are moist.      Pharynx: Oropharynx is clear. Cardiovascular:      Rate and Rhythm: Regular rhythm. Tachycardia present. Heart sounds: Normal heart sounds. Pulmonary:      Effort: Pulmonary effort is normal.      Breath sounds: Normal breath sounds. Abdominal:      General: Abdomen is protuberant. Bowel sounds are normal. There is no distension. Palpations: Abdomen is soft. Tenderness: There is abdominal tenderness in the epigastric area, periumbilical area, left upper quadrant and left lower quadrant. There is no right CVA tenderness or left CVA tenderness. Hernia: No hernia is present. Skin:     General: Skin is warm and dry. Neurological:      Mental Status: He is alert and oriented to person, place, and time.    Psychiatric:         Mood and Affect: Mood normal.         Behavior: Behavior normal.         DIAGNOSTIC RESULTS   LABS:    Labs Reviewed   CBC WITH AUTO DIFFERENTIAL - Abnormal; Notable for the following components:       Result Value    Neutrophils Absolute 7.8 (*)     All other components within normal limits   COMPREHENSIVE METABOLIC PANEL W/ REFLEX TO MG FOR LOW K - Abnormal; Notable for the following components:    Glucose 165 (*) Resp: 18 16 24 18   Temp:    97.5 °F (36.4 °C)   TempSrc:    Temporal   SpO2: 95% 94% 97% 95%   Weight:       Height:           Patient was given the following medications:  Medications   tamsulosin (FLOMAX) capsule 0.4 mg (has no administration in time range)   ceFAZolin (ANCEF) 2000 mg in dextrose 5 % 50 mL IVPB (2,000 mg IntraVENous New Bag 4/9/22 1511)   meperidine (DEMEROL) injection 12.5 mg (has no administration in time range)   HYDROmorphone (DILAUDID) injection 0.5 mg (has no administration in time range)   oxyCODONE (ROXICODONE) immediate release tablet 5 mg (has no administration in time range)   ondansetron (ZOFRAN) injection 4 mg (has no administration in time range)   labetalol (NORMODYNE;TRANDATE) injection 10 mg (has no administration in time range)     Or   hydrALAZINE (APRESOLINE) injection 10 mg (has no administration in time range)   0.9 % sodium chloride bolus (0 mLs IntraVENous Stopped 4/9/22 1221)   ondansetron (ZOFRAN) injection 4 mg (4 mg IntraVENous Given 4/9/22 1041)   aluminum & magnesium hydroxide-simethicone (MAALOX) 30 mL, lidocaine viscous hcl (XYLOCAINE) 5 mL (GI COCKTAIL) ( Oral Given 4/9/22 1054)   morphine injection 4 mg (4 mg IntraVENous Given 4/9/22 1138)     ED Course as of 04/09/22 1517   Sat Apr 09, 2022   1135 Patient about his elevated creatinine level and kidney function. Patient states he has never been told that he had kidney issues in the past.  Patient states his pain is not improved with GI cocktail and he still has the nausea. Patient rates his pain at 9 out of 10 at this time and will give him some morphine for his pain. [PE]      ED Course User Index  [PE] CHADD Herring        72-year-old male presents with abdominal pain nausea vomiting starting 3 days ago. Patient has a history of kidney stones. In the emergency department CBC CMP urinalysis and CT scan of his abdomen and pelvis were ordered.   Patient's CMP did show an elevated creatinine which is concerning for acute kidney injury. CT scan showed an 8 mm kidney stone in the left. Urology was consulted due to the patient not having improved pain with morphine. Spoke with Dr. Feliz Roberto who stated that he was be able to get him in for a stent placement to help facilitate removal at this time. Patient will be admitted at this time due to his acute kidney injury with a 8mm kidney stone in the left ureter causing hydronephrosis. Prior to admission I discussed these findings with the patient and the patient's wife. FINAL IMPRESSION      1. Kidney stone    2. ONOFRE (acute kidney injury) ([facility] [address] [facility]          DISPOSITION/PLAN   DISPOSITION Admitted 04/09/2022 02:26:15 PM      PATIENT REFERRED TO:  No follow-up provider specified. DISCHARGE MEDICATIONS:  Current Discharge Medication List          DISCONTINUED MEDICATIONS:  Current Discharge Medication List                 (Please note that portions of this note were completed with a voice recognition program.  Efforts were made to edit the dictations but occasionally words are mis-transcribed.)    CHADD Malik (electronically signed)           CHADD Malik  04/09/22 2667    Addendum: HPI added today as this was accidentally omitted at initial time of visit.  MDM also had correct history of present illness at time of exam. 05/06/2022 [place], [place]  05/06/22 8469

## 2022-04-09 NOTE — ANESTHESIA PRE PROCEDURE
Department of Anesthesiology  Preprocedure Note       Name:  Aaron Lynch   Age:  49 y.o.  :  1965                                          MRN:  [id number]         Date:  2022      Surgeon: Debi Dyer):  James Boyd MD    Procedure: Procedure(s):  CYSTOSCOPY LEFT URETERAL STENT INSERTION    Medications prior to admission:   Prior to Admission medications    Medication Sig Start Date End Date Taking? Authorizing Provider   amLODIPine (NORVASC) 5 MG tablet TAKE 1 TABLET BY MOUTH EVERY DAY 3/28/22   Pelon Welch MD   glimepiride (AMARYL) 2 MG tablet TAKE 1 TABLET BY MOUTH EVERY DAY IN THE MORNING 10/4/21   Pelon Welch MD   azelastine (ASTELIN) 0.1 % nasal spray 1 spray by Nasal route 2 times daily Use in each nostril as directed 21   Pelon Welch MD   aspirin 81 MG EC tablet Take 81 mg by mouth daily    Historical Provider, MD   hyoscyamine (LEVSIN) 125 MCG tablet Take 1 tablet by mouth every 4 hours as needed for Cramping 19   Corrine Gillis MD   albuterol sulfate HFA (PROAIR HFA) 108 (90 Base) MCG/ACT inhaler Inhale 2 puffs into the lungs 4 times daily as needed for Wheezing 18   Pelon Welch MD   glucose blood VI test strips (ONE TOUCH ULTRA TEST) strip Tests once daily 14   Pelon Welch MD   Blood Glucose Monitoring Suppl (ONE TOUCH ULTRA) by Does not apply route. Historical Provider, MD   famotidine (PEPCID) 20 MG tablet Take 20 mg by mouth daily. Historical Provider, MD       Current medications:    No current facility-administered medications for this encounter.      Current Outpatient Medications   Medication Sig Dispense Refill    amLODIPine (NORVASC) 5 MG tablet TAKE 1 TABLET BY MOUTH EVERY DAY 90 tablet 1    glimepiride (AMARYL) 2 MG tablet TAKE 1 TABLET BY MOUTH EVERY DAY IN THE MORNING 90 tablet 1    azelastine (ASTELIN) 0.1 % nasal spray 1 spray by Nasal route 2 times daily Use in each nostril as directed 1 Bottle 5    aspirin 81 MG EC tablet Take 81 mg by mouth daily      hyoscyamine (LEVSIN) 125 MCG tablet Take 1 tablet by mouth every 4 hours as needed for Cramping 50 tablet 3    albuterol sulfate HFA (PROAIR HFA) 108 (90 Base) MCG/ACT inhaler Inhale 2 puffs into the lungs 4 times daily as needed for Wheezing 3 Inhaler 2    glucose blood VI test strips (ONE TOUCH ULTRA TEST) strip Tests once daily 100 each 3    Blood Glucose Monitoring Suppl (ONE TOUCH ULTRA) by Does not apply route.  famotidine (PEPCID) 20 MG tablet Take 20 mg by mouth daily. Allergies:     Allergies   Allergen Reactions    Ace Inhibitors      COUGH      Pollen Extract        Problem List:    Patient Active Problem List   Diagnosis Code    Essential hypertension I10    Obesity E66.9    Sleep apnea G47.30    Type 2 diabetes mellitus without complication, without long-term current use of insulin ([facility]) E11.9    Mild intermittent asthma without complication G45.43    Carpal tunnel syndrome of right wrist G56.01    Nephrolithiasis N20.0       Past Medical History:        Diagnosis Date    Diabetes mellitus ([facility]     Hypertension     Obesity, unspecified     MARIA A on CPAP     Other abnormal blood chemistry     Seasonal allergies     Unspecified sleep apnea        Past Surgical History:        Procedure Laterality Date    CATARACT REMOVAL WITH IMPLANT  2009, 2010    B eyes    CYSTOSCOPY Left 8/24/2021    CYSTOSCOPY, LEFT RETROGRADE PYELOGRAM, PLACEMENT OF LEFT URETERAL STENT performed by Michael Calderon MD at [facility] Left 9/2/2021    CYSTOSCOPY WITH LEFT URETEROSCOPY WITH HOLMIUM LASER LITHOTRIPSY, STONE MANIPULATION AND LEFT STENT REMOVAL performed by Michael Calderon MD at [facility]  08/2018       Social History:    Social History     Tobacco Use    Smoking status: Never Smoker    Smokeless tobacco: Never Used   Substance Use Topics    Alcohol use: Yes     Comment: socially                                Counseling given: Not Answered      Vital Signs (Current):   Vitals:    04/09/22 1246 04/09/22 1324 04/09/22 1330 04/09/22 1346   BP: (!) 152/84 (!) 161/84 (!) 157/85 (!) 152/93   Pulse: 93 89 84 92   Resp: 17 18 16 24   Temp:       TempSrc:       SpO2: 96% 95% 94% 97%   Weight:       Height:                                                  BP Readings from Last 3 Encounters:   04/09/22 (!) 152/93   11/15/21 (!) 151/91   09/17/21 122/70       NPO Status:                                                                                 BMI:   Wt Readings from Last 3 Encounters:   04/09/22 250 lb (113.4 kg)   11/15/21 253 lb (114.8 kg)   09/17/21 248 lb 9.6 oz (112.8 kg)     Body mass index is 37.46 kg/m². CBC:   Lab Results   Component Value Date    WBC 10.6 04/09/2022    RBC 4.76 04/09/2022    HGB 15.6 04/09/2022    HCT 46.0 04/09/2022    MCV 96.5 04/09/2022    RDW 14.5 04/09/2022     04/09/2022       CMP:   Lab Results   Component Value Date     04/09/2022    K 4.2 04/09/2022     04/09/2022    CO2 25 04/09/2022    BUN 24 04/09/2022    CREATININE 1.8 04/09/2022    GFRAA 48 04/09/2022    GFRAA >60 03/01/2012    AGRATIO 1.4 04/09/2022    LABGLOM 39 04/09/2022    GLUCOSE 165 04/09/2022    PROT 6.9 04/09/2022    PROT 7.6 03/01/2012    CALCIUM 8.9 04/09/2022    BILITOT 0.8 04/09/2022    ALKPHOS 57 04/09/2022    AST 36 04/09/2022    ALT 34 04/09/2022       POC Tests: No results for input(s): POCGLU, POCNA, POCK, POCCL, POCBUN, POCHEMO, POCHCT in the last 72 hours.     Coags: No results found for: PROTIME, INR, APTT    HCG (If Applicable): No results found for: PREGTESTUR, PREGSERUM, HCG, HCGQUANT     ABGs: No results found for: PHART, PO2ART, AEH5XWI, IZA9JVD, BEART, X9FLSMYU     Type & Screen (If Applicable):  No results found for: LABABO, LABRH    Drug/Infectious Status (If Applicable):  No results found for: HIV, HEPCAB    COVID-19 Screening (If Applicable): Lab Results   Component Value Date    COVID19 Detected 01/14/2022           Anesthesia Evaluation  Patient summary reviewed and Nursing notes reviewed no history of anesthetic complications:   Airway: Mallampati: III  TM distance: >3 FB   Neck ROM: full  Mouth opening: > = 3 FB Dental: normal exam         Pulmonary:normal exam  breath sounds clear to auscultation  (+) sleep apnea: on noncompliant,  asthma (mild intermittent per report):     (-) not a current smoker                           Cardiovascular:    (+) hypertension:,     (-) past MI, CAD, CABG/stent, dysrhythmias,  angina and  CHF    ECG reviewed  Rhythm: regular  Rate: normal                    Neuro/Psych:   Negative Neuro/Psych ROS     (-) seizures, TIA and CVA           GI/Hepatic/Renal:   (+) GERD: well controlled, renal disease: ARF and kidney stones,      (-) liver disease       Endo/Other:    (+) Diabetes (a1c 5.9)Type II DM, , .    (-) hypothyroidism, hyperthyroidism               Abdominal:   (+) obese,           Vascular: Other Findings:           Anesthesia Plan      general     ASA 3 - emergent       Induction: intravenous. MIPS: Postoperative opioids intended and Prophylactic antiemetics administered. Anesthetic plan and risks discussed with patient.                       Owen Olivares MD   4/9/2022

## 2022-04-09 NOTE — TELEPHONE ENCOUNTER
Received call from Rosmery Weeks at [facility] with Red Flag Complaint. Subjective: Caller states \"It started thurs afternoon I left work early because I was nauseated and vomiting at work, then yesterday I was feeling the same way \"     Current Symptoms: Vomiting, abdominal cramps/ pains     Onset: 2 days ago; sudden    Associated Symptoms: NA    Pain Severity: 2/10; sharp/ cramping ; intermittent    Temperature: unknown     What has been tried: Pepto bismol and warm bath     Recommended disposition: Go to ED Now    Care advice provided, patient verbalizes understanding; denies any other questions or concerns; instructed to call back for any new or worsening symptoms. Patient/caller agrees to proceed to [facility]  Emergency Department     Attention Provider: Thank you for allowing me to participate in the care of your patient. The patient was connected to triage in response to information provided to the ECC/PSC. Please do not respond through this encounter as the response is not directed to a shared pool. Reason for Disposition   Pain is mainly in upper abdomen  (if needed ask: \"is it mainly above the belly button? \")   [1] Pain lasts > 10 minutes AND [2] age > 50    Protocols used: ABDOMINAL PAIN - MALE-ADULT-AH, ABDOMINAL PAIN - UPPER-ADULT-AH

## 2022-04-09 NOTE — RT PROTOCOL NOTE
using Per Protocol order mode. 4-6 - enter or revise RT Bronchodilator order(s) to two equivalent RT bronchodilator orders with one order with BID Frequency and one order with Frequency of every 4 hours PRN wheezing or increased work of breathing using Per Protocol order mode. 7-10 - enter or revise RT Bronchodilator order(s) to two equivalent RT bronchodilator orders with one order with TID Frequency and one order with Frequency of every 4 hours PRN wheezing or increased work of breathing using Per Protocol order mode. 11-13 - enter or revise RT Bronchodilator order(s) to one equivalent RT bronchodilator order with QID Frequency and an Albuterol order with Frequency of every 4 hours PRN wheezing or increased work of breathing using Per Protocol order mode. Greater than 13 - enter or revise RT Bronchodilator order(s) to one equivalent RT bronchodilator order with every 4 hours Frequency and an Albuterol order with Frequency of every 2 hours PRN wheezing or increased work of breathing using Per Protocol order mode. RT to enter RT Home Evaluation for COPD & MDI Assessment order using Per Protocol order mode.     Electronically signed by Martín Ridley RCP on 4/9/2022 at 5:27 PM

## 2022-04-09 NOTE — LETTER
[address] [id number]  Phone: [phone]    No name on file. April 10, 2022     Patient: Jordan Nichols   YOB: 1965   Date of Visit: 4/9/2022       To Whom It May Concern: It is my medical opinion that Samantha Meyers may return to work on 4/18/2022. If you have any questions or concerns, please don't hesitate to call.     Sincerely,        Denzel Zhang -[phone]    Genny Blount [phone]

## 2022-04-09 NOTE — ED NOTES
Lab called regarding 2 sets of blood cultures that need to be drawn. Lactic acid sent.       Lc Macias RN  04/09/22 3173

## 2022-04-09 NOTE — ED PROVIDER NOTES
This patient was seen by the Mid-Level Provider. I have seen and examined the patient, agree with the workup, evaluation, management and diagnosis. Care plan has been discussed. My assessment reveals 61-year-old male presents with abdominal pain. This is a 61-year-old male who presents with right-sided flank and abdominal pain that radiates around to the left side at this time. He has had the symptoms for the last few days. He describes it as cramping sensation. The patient does have a history of kidney stones in the past.          Radiology results:    CT ABDOMEN PELVIS WO CONTRAST Additional Contrast? None   Final Result   Left obstructive uropathy related to an 8 mm stone in the left mid ureter. LABS:    Labs Reviewed   CBC WITH AUTO DIFFERENTIAL - Abnormal; Notable for the following components:       Result Value    Neutrophils Absolute 7.8 (*)     All other components within normal limits   COMPREHENSIVE METABOLIC PANEL W/ REFLEX TO MG FOR LOW K - Abnormal; Notable for the following components:    Glucose 165 (*)     BUN 24 (*)     CREATININE 1.8 (*)     GFR Non- 39 (*)     GFR  48 (*)     All other components within normal limits   URINALYSIS WITH REFLEX TO CULTURE - Abnormal; Notable for the following components:    Blood, Urine LARGE (*)     Protein, UA TRACE (*)     All other components within normal limits   MICROSCOPIC URINALYSIS - Abnormal; Notable for the following components:    Crystals, UA 1+ Uric Acid (*)     WBC, UA 6 (*)     RBC, UA 5 (*)     All other components within normal limits   CULTURE, BLOOD 1   CULTURE, BLOOD 2   LIPASE   TROPONIN   LACTATE, SEPSIS           EKG:    Sinus rhythm at a rate of 91 beats a minute with no acute ST elevations or depressions or pathologic Q waves. Exam:    Well-nourished male in no acute distress. Abdomen was soft. He had no significant pain in his right flank area.   Minimal pain over the left abdominal area.      Medical decision making:    The patient has remained stable throughout his hospital course. He has received pain control and IV fluids. The patient's work-up included a chemistry profile that shows an acute kidney injury. A CT of the patient's abdomen shows an obstructing 8 mm mid ureteral stone on the left. We have contacted urology who will be promptly seen the patient. The patient will be admitted for further care and expectant surgery for his kidney stone. He is in stable condition. FINAL IMPRESSION:    1. Kidney stone    2.  ONOFRE (acute kidney [facility]) ([facility]           Vikas Soto MD  04/09/22 1413

## 2022-04-09 NOTE — CONSULTS
[facility] Consult Note  [facility]    Provider: Collette Squires, MD MD Patient ID:  Admission Date: 2022 Name: Jorge Diaz Date: n/a MRN: [id number]   Patient Location: ED-000 : 1965  Attending: Jonathan Cabrera MD Date of Service: 2022  PCP: Clara Yuen MD     Diagnoses: ONOFRE   LEFT ureteral stone  Left renal stones    Assessment/Plan:  N.p.o. for urgent cystoscopy left side ureteral stent placement due to ONOFRE and pain management the procedure, the consultation, and the decision to proceed to the operating room will all occur within the same 24 hours.  -He will require future left-sided ureteroscopy for ureteral stone.  -He may require future ESWL for the renal stones. His weight may be an issue for that ESWL procedure however. All the patients questions were answered in detail. He understands the plan as listed above. CC:   Chief Complaint   Patient presents with    Abdominal Pain     n/v and abd cramping x \"few days\"       HPI: Fléix Metz is a 49 y.o. male. I saw the patient today for LEFT sided ureteral stones, and associated symptoms of LEFT sided flank pain, that have been present for hours. Symptoms relieved by pain medications and aggravated by the stone. He has tried the following treatments: He does have a prior kidney stone history. Past medical History:   He has a past medical history of Diabetes mellitus ([address] Hypertension, Obesity, unspecified, MARIA A on CPAP, Other abnormal blood chemistry, Seasonal allergies, and Unspecified sleep apnea. Past Surgical History:  He has a past surgical history that includes Cataract removal with implant (, ); Tooth Extraction (2018); Cystoscopy (Left, 2021); and Cystoscopy (Left, 2021). Allergies:    Allergies   Allergen Reactions    Ace Inhibitors      COUGH      Pollen Extract        Social History:  He reports that he has never smoked. He has never used smokeless tobacco. He reports current alcohol use. He reports that he does not use drugs. Family History:  family history includes Arthritis in his mother; High Blood Pressure in his father; Kidney stones in his mother; Other in his father and mother. Medications:   Scheduled Meds:  Continuous Infusions:  PRN Meds:    Review of Systems:  Constitutional: Negative for fever    Genitourinary: see HPI  Eyes: negative for sudden change in vision  EENT: no complaints  Cardiovascular: Negative for chest pain  Respiratory: Negative for shortness of breath  Gastrointestinal: Negative for nausea  Musculoskeletal: Negative for back pain   Neurological: Negative for weakness  Psychiatric: Negative for anxiety  Integumentary: Negative for rashes or adenopathy     Physical Exam:  Vitals:    04/09/22 1330   BP: (!) 157/85   Pulse: 84   Resp: 16   Temp:    SpO2: 94%     Constitutional: NAD, well-developed, well-nourished. HEENT: MMM. Hearing intact. PERRL  Neck: no thyroid masses appreciated. Trachea is midline. Neck appears unremarkable   Lymph: no palpable adenopathy in supraclavicular, or axillary lymph nodes  Cardiovascular: Regular rate. No peripheral edema  Respiratory: Respirations are even and non-labored. No audible breath sounds. Genitourinary: circumcised penis, glans normal, no penile discharge. No rashes/lesions. Testes descended bilaterally, no masses. Remainder of scrotal contents normal. No hernia appreciated. Rectal:  Normal tone, no masses. Prostate: 35 grams. Symmetric but firm. SV non palpable. Sphincter tone is adequate. Abdomen: Soft. No distension, tenderness, hernias, masses or guarding. No CVA tenderness. No hernias appreciated. Liver and spleen appear normal  Psychiatric: A + O x 3, normal affect. Insight appears intact.    Muskuloskeletal: WILLIS x 4 Skin: Pink, warm and dry. No rashes on face and arms.     Labs:  Lab Results   Component Value Date    WBC 10.6 04/09/2022    HGB 15.6 04/09/2022    HCT 46.0 04/09/2022    MCV 96.5 04/09/2022     04/09/2022     Lab Results   Component Value Date    CREATININE 1.8 (H) 04/09/2022    BUN 24 (H) 04/09/2022     04/09/2022    K 4.2 04/09/2022     04/09/2022    CO2 25 04/09/2022     Lab Results   Component Value Date    PSA 0.76 08/25/2021      Imaging:   CT scan abdomen pelvis without contrast performed 4/9/2022  Impression   Left obstructive uropathy related to an 8 mm stone in the left mid ureter     Clive Silverman MD, MD  4/9/2022 [employer]

## 2022-04-09 NOTE — PROGRESS NOTES
Patient transferred from OR to PACU, not yet responding to physical stimuli, oral airway in place, will monitor.

## 2022-04-09 NOTE — ANESTHESIA POSTPROCEDURE EVALUATION
Department of Anesthesiology  Postprocedure Note    Patient: Parrish Gómez  MRN: [id number]  YOB: 1965  Date of evaluation: 4/9/2022  Time:  4:00 PM     Procedure Summary     Date: 04/09/22 Room / Location: [address]    Anesthesia Start: 8272 Anesthesia Stop: 1540    Procedure: CYSTOSCOPY LEFT URETERAL STENT INSERTION (Left ) Diagnosis: (left renal calculus)    Surgeons: Jean Marie Metz MD Responsible Provider: Heidi Gowers, MD    Anesthesia Type: general ASA Status: 3 - Emergent          Anesthesia Type: general    Anibal Phase I: Anibal Score: 5    Anibal Phase II:      Last vitals: Reviewed and per EMR flowsheets.        Anesthesia Post Evaluation    Patient location during evaluation: PACU  Patient participation: complete - patient participated  Level of consciousness: awake and alert  Airway patency: patent  Nausea & Vomiting: no vomiting and no nausea  Complications: no  Cardiovascular status: hemodynamically stable  Respiratory status: acceptable  Hydration status: stable

## 2022-04-10 VITALS
SYSTOLIC BLOOD PRESSURE: 128 MMHG | OXYGEN SATURATION: 94 % | RESPIRATION RATE: 16 BRPM | DIASTOLIC BLOOD PRESSURE: 69 MMHG | TEMPERATURE: 97.5 F | HEART RATE: 105 BPM | WEIGHT: 250 LBS | HEIGHT: 69 IN | BODY MASS INDEX: 37.03 KG/M2

## 2022-04-10 LAB
ANION GAP SERPL CALCULATED.3IONS-SCNC: 10 MMOL/L (ref 3–16)
ANION GAP SERPL CALCULATED.3IONS-SCNC: 10 MMOL/L (ref 3–16)
BASOPHILS ABSOLUTE: 0 K/UL (ref 0–0.2)
BASOPHILS RELATIVE PERCENT: 0.5 %
BUN BLDV-MCNC: 22 MG/DL (ref 7–20)
BUN BLDV-MCNC: 22 MG/DL (ref 7–20)
CALCIUM SERPL-MCNC: 8.6 MG/DL (ref 8.3–10.6)
CALCIUM SERPL-MCNC: 8.8 MG/DL (ref 8.3–10.6)
CHLORIDE BLD-SCNC: 103 MMOL/L (ref 99–110)
CHLORIDE BLD-SCNC: 107 MMOL/L (ref 99–110)
CO2: 24 MMOL/L (ref 21–32)
CO2: 25 MMOL/L (ref 21–32)
CREAT SERPL-MCNC: 1.1 MG/DL (ref 0.9–1.3)
CREAT SERPL-MCNC: 1.5 MG/DL (ref 0.9–1.3)
EKG ATRIAL RATE: 91 BPM
EKG DIAGNOSIS: NORMAL
EKG P AXIS: 49 DEGREES
EKG P-R INTERVAL: 166 MS
EKG Q-T INTERVAL: 374 MS
EKG QRS DURATION: 104 MS
EKG QTC CALCULATION (BAZETT): 460 MS
EKG R AXIS: -1 DEGREES
EKG T AXIS: 2 DEGREES
EKG VENTRICULAR RATE: 91 BPM
EOSINOPHILS ABSOLUTE: 0.1 K/UL (ref 0–0.6)
EOSINOPHILS RELATIVE PERCENT: 1.7 %
ESTIMATED AVERAGE GLUCOSE: 128.4 MG/DL
GFR AFRICAN AMERICAN: 59
GFR AFRICAN AMERICAN: >60
GFR NON-AFRICAN AMERICAN: 49
GFR NON-AFRICAN AMERICAN: >60
GLUCOSE BLD-MCNC: 101 MG/DL (ref 70–99)
GLUCOSE BLD-MCNC: 102 MG/DL (ref 70–99)
GLUCOSE BLD-MCNC: 145 MG/DL (ref 70–99)
GLUCOSE BLD-MCNC: 160 MG/DL (ref 70–99)
GLUCOSE BLD-MCNC: 176 MG/DL (ref 70–99)
GLUCOSE BLD-MCNC: 94 MG/DL (ref 70–99)
HBA1C MFR BLD: 6.1 %
HCT VFR BLD CALC: 43.7 % (ref 40.5–52.5)
HEMOGLOBIN: 14.8 G/DL (ref 13.5–17.5)
LYMPHOCYTES ABSOLUTE: 2.4 K/UL (ref 1–5.1)
LYMPHOCYTES RELATIVE PERCENT: 34.1 %
MCH RBC QN AUTO: 33.4 PG (ref 26–34)
MCHC RBC AUTO-ENTMCNC: 34 G/DL (ref 31–36)
MCV RBC AUTO: 98.3 FL (ref 80–100)
MONOCYTES ABSOLUTE: 0.6 K/UL (ref 0–1.3)
MONOCYTES RELATIVE PERCENT: 8.3 %
NEUTROPHILS ABSOLUTE: 4 K/UL (ref 1.7–7.7)
NEUTROPHILS RELATIVE PERCENT: 55.4 %
PDW BLD-RTO: 14.4 % (ref 12.4–15.4)
PERFORMED ON: ABNORMAL
PERFORMED ON: NORMAL
PLATELET # BLD: 174 K/UL (ref 135–450)
PMV BLD AUTO: 9 FL (ref 5–10.5)
POTASSIUM REFLEX MAGNESIUM: 4.2 MMOL/L (ref 3.5–5.1)
POTASSIUM SERPL-SCNC: 4.5 MMOL/L (ref 3.5–5.1)
RBC # BLD: 4.44 M/UL (ref 4.2–5.9)
SODIUM BLD-SCNC: 138 MMOL/L (ref 136–145)
SODIUM BLD-SCNC: 141 MMOL/L (ref 136–145)
WBC # BLD: 7.1 K/UL (ref 4–11)

## 2022-04-10 PROCEDURE — 85025 COMPLETE CBC W/AUTO DIFF WBC: CPT

## 2022-04-10 PROCEDURE — 93010 ELECTROCARDIOGRAM REPORT: CPT | Performed by: INTERNAL MEDICINE

## 2022-04-10 PROCEDURE — 6370000000 HC RX 637 (ALT 250 FOR IP): Performed by: HOSPITALIST

## 2022-04-10 PROCEDURE — 6360000002 HC RX W HCPCS: Performed by: HOSPITALIST

## 2022-04-10 PROCEDURE — 6370000000 HC RX 637 (ALT 250 FOR IP): Performed by: UROLOGY

## 2022-04-10 PROCEDURE — 36415 COLL VENOUS BLD VENIPUNCTURE: CPT

## 2022-04-10 PROCEDURE — 2580000003 HC RX 258: Performed by: HOSPITALIST

## 2022-04-10 PROCEDURE — 80048 BASIC METABOLIC PNL TOTAL CA: CPT

## 2022-04-10 RX ORDER — PANTOPRAZOLE SODIUM 40 MG/1
40 TABLET, DELAYED RELEASE ORAL
Status: DISCONTINUED | OUTPATIENT
Start: 2022-04-11 | End: 2022-04-10 | Stop reason: HOSPADM

## 2022-04-10 RX ADMIN — SODIUM CHLORIDE, PRESERVATIVE FREE 10 ML: 5 INJECTION INTRAVENOUS at 08:56

## 2022-04-10 RX ADMIN — Medication 1000 MG: at 17:40

## 2022-04-10 RX ADMIN — SODIUM CHLORIDE: 9 INJECTION, SOLUTION INTRAVENOUS at 06:15

## 2022-04-10 RX ADMIN — INSULIN LISPRO 1 UNITS: 100 INJECTION, SOLUTION INTRAVENOUS; SUBCUTANEOUS at 12:07

## 2022-04-10 RX ADMIN — ENOXAPARIN SODIUM 40 MG: 40 INJECTION SUBCUTANEOUS at 08:56

## 2022-04-10 RX ADMIN — ASPIRIN 81 MG: 81 TABLET, COATED ORAL at 08:56

## 2022-04-10 RX ADMIN — TAMSULOSIN HYDROCHLORIDE 0.4 MG: 0.4 CAPSULE ORAL at 08:56

## 2022-04-10 RX ADMIN — AMLODIPINE BESYLATE 5 MG: 5 TABLET ORAL at 08:56

## 2022-04-10 ASSESSMENT — PAIN SCALES - GENERAL
PAINLEVEL_OUTOF10: 0

## 2022-04-10 NOTE — PROGRESS NOTES
Hospitalist Progress Note      PCP: Yosi Zhu MD    Date of Admission: 4/9/2022    Chief Complaint: Nausea vomiting and abdominal pain    Hospital Course: This is 51-year-old male admitted with a nausea vomiting and abdominal pain since Thursday afternoon CT of the abdomen and pelvis with left obstructive uropathy related to an 8 mm stone in the left mid ureter, acute kidney injury nephrology consulted is status post cystoscopy and left ureteral stent placement on 4/9/2022. Subjective: Patient denies any nausea vomiting no abdominal pain wife at the bedside, continues complain of decreased p.o. intake. Medications:  Reviewed    Infusion Medications    dextrose      dextrose      sodium chloride      sodium chloride 150 mL/hr at 04/10/22 0615     Scheduled Medications    amLODIPine  5 mg Oral Daily    aspirin  81 mg Oral Daily    sodium chloride flush  5-40 mL IntraVENous 2 times per day    enoxaparin  40 mg SubCUTAneous Daily    cefTRIAXone (ROCEPHIN) IV  1,000 mg IntraVENous Q24H    tamsulosin  0.4 mg Oral Daily    insulin lispro  0-6 Units SubCUTAneous TID WC    insulin lispro  0-3 Units SubCUTAneous Nightly     PRN Meds: albuterol sulfate HFA, famotidine, glucose, dextrose, glucagon (rDNA), dextrose, sodium chloride flush, sodium chloride, ondansetron **OR** ondansetron, polyethylene glycol, acetaminophen **OR** acetaminophen, potassium chloride **OR** potassium alternative oral replacement **OR** potassium chloride, HYDROmorphone      Intake/Output Summary (Last 24 hours) at 4/10/2022 0945  Last data filed at 4/10/2022 0617  Gross per 24 hour   Intake 1100 ml   Output 1575 ml   Net -475 ml       Physical Exam Performed:    BP (!) 156/83   Pulse 96   Temp 97.9 °F (36.6 °C) (Oral)   Resp 16   Ht 5' 8.5\" (1.74 m)   Wt 250 lb (113.4 kg)   SpO2 95%   BMI 37.46 kg/m²     General appearance: No apparent distress, appears stated age and cooperative.   HEENT: Pupils equal, round, and reactive to light. Conjunctivae/corneas clear. Neck: Supple, with full range of motion. No jugular venous distention. Trachea midline. Respiratory:  Normal respiratory effort. Clear to auscultation, bilaterally without Rales/Wheezes/Rhonchi. Cardiovascular: Regular rate and rhythm with normal S1/S2 without murmurs, rubs or gallops. Abdomen: Soft, non-tender, non-distended with normal bowel sounds. Musculoskeletal: No clubbing, cyanosis or edema bilaterally. Full range of motion without deformity. Skin: Skin color, texture, turgor normal.  No rashes or lesions. Neurologic:  Neurovascularly intact without any focal sensory/motor deficits. Cranial nerves: II-XII intact, grossly non-focal.  Psychiatric: Alert and oriented, thought content appropriate, normal insight  Capillary Refill: Brisk,3 seconds, normal   Peripheral Pulses: +2 palpable, equal bilaterally       Labs:   Recent Labs     04/09/22  1039 04/10/22  0557   WBC 10.6 7.1   HGB 15.6 14.8   HCT 46.0 43.7    174     Recent Labs     04/09/22  1039 04/10/22  0557    141   K 4.2 4.2    107   CO2 25 24   BUN 24* 22*   CREATININE 1.8* 1.5*   CALCIUM 8.9 8.6     Recent Labs     04/09/22  1039   AST 36   ALT 34   BILITOT 0.8   ALKPHOS 57     No results for input(s): INR in the last 72 hours. Recent Labs     04/09/22  1039   TROPONINI <0.01       Urinalysis:      Lab Results   Component Value Date    NITRU Negative 04/09/2022    WBCUA 6 04/09/2022    BACTERIA neg 09/25/2020    RBCUA 5 04/09/2022    BLOODU LARGE 04/09/2022    SPECGRAV 1.020 04/09/2022    GLUCOSEU Negative 04/09/2022       Radiology:  FL RETROGRADE PYELOGRAM LEFT   Final Result   Intraprocedural fluoroscopic spot images as above. See separate procedure   note for more information. CT ABDOMEN PELVIS WO CONTRAST Additional Contrast? None   Final Result   Left obstructive uropathy related to an 8 mm stone in the left mid ureter. Assessment/Plan:    Active Hospital Problems    Diagnosis     Nephrolithiasis [N20.0]    Acute kidney injury  Hypertension  Prediabetic    1. This is a 43-year-old male admitted with a left obstructive uropathy urology consulted is status post cystoscopy and left ureteral stent placement on 4/9/2022 continue with IV Rocephin and IV fluid, Flomax. 2.  Acute kidney injury continue with IV hydration improving check BMP now if continues to improve possible discharge home discussed with the patient and family as they are anxious to be discharged home today. 3.  Hypertension continue with home medication. 4.  Diabetes mellitus type 2 p.o. medication on hold continue with the sliding scale and diabetic diet, hemoglobin A1c= 6.1 on 4/9/2022        DVT Prophylaxis: Lovenox subcu  Diet: ADULT DIET;  Regular  Code Status: Full Code    PT/OT Eval Status: Not indicated    Dispo -later today or tomorrow pending improvement of renal function    Lala Card MD

## 2022-04-10 NOTE — PLAN OF CARE
Problem: Serum Glucose Level - Abnormal:  Goal: Ability to maintain appropriate glucose levels has stabilized  Description: Ability to maintain appropriate glucose levels has stabilized  Outcome: Ongoing  Note: Pt.'s BS have been WNL, not requiring insulin     Problem: Pain:  Goal: Pain level will decrease  Description: Pain level will decrease  Outcome: Ongoing  Note: No pain reported this shift  Goal: Control of acute pain  Description: Control of acute pain  Outcome: Ongoing  Goal: Control of chronic pain  Description: Control of chronic pain  Outcome: Ongoing

## 2022-04-10 NOTE — PROGRESS NOTES
Urology Progress Note  [facility]    Provider: Massimo David MD MD Patient ID:  Admission Date: 2022 Name: Hayley Marion Date: 2022 MRN: [id number]   Patient Location: [facility]-[id number] : 1965  Attending: Daisy Rolle MD Date of Service: 4/10/2022  PCP: Jhon Osullivan MD     Diagnoses: ONOFRE   LEFT ureteral stone  Left renal stones     Assessment/Plan:  74-year-old male status post left side ureteral stent on 4/10/2022  -He will require future left-sided ureteroscopy for ureteral stone. A message has been left with the office for scheduling.  -He may require future ESWL for the renal stones. His weight may be an issue for that ESWL procedure however.   -Renal function monitoring per primary team, discharge her medically ready     The patient had a chance to ask questions which were answered. he understands the above plan. Subjective:   Anh Dawkins is a 49 y.o. male. He was seen and examined this morning. Today we discussed discharge plans. His mother had questions about future stone evaluation for risk factors. He has already been undergoing this with Dr. Panchito Reilly. He is already on potassium citrate through her office. Objective:   Vitals:  Vitals:    04/10/22 1215   BP: (!) 140/83   Pulse: 99   Resp: 16   Temp: 97.6 °F (36.4 °C)   SpO2: 95%       Intake/Output Summary (Last 24 hours) at 4/10/2022 1245  Last data filed at 4/10/2022 0617  Gross per 24 hour   Intake 1100 ml   Output 1575 ml   Net -475 ml     Physical Exam:  Gen: Alert and oriented x3, no acute distress  CV: Regular rate   Resp: unlabored respirations  Abd: Soft, non-distended, non-tender, no masses  Ext: no peripheral edema noted, moves upper and lower extremities spontaneously  Skin: warm and well perfused, no rashes noted on the face, or arms.      Labs:  Lab Results   Component Value Date    WBC 7.1 04/10/2022    HGB 14.8 04/10/2022    HCT 43.7 04/10/2022    MCV 98.3 04/10/2022     04/10/2022     Lab Results   Component Value Date    CREATININE 1.5 (H) 04/10/2022    BUN 22 (H) 04/10/2022     04/10/2022    K 4.2 04/10/2022     04/10/2022    CO2 24 04/10/2022       Masood Lozano MD MD  4/10/2022

## 2022-04-10 NOTE — PROGRESS NOTES
Data- discharge order received, pt verbalized agreement to discharge, disposition to previous residence, no needs for HHC/DME. Action- discharge instructions prepared and given to pt., pt verbalized understanding. Medication information packet given r/t NEW and/or CHANGED prescriptions emphasizing name/purpose/side effects, pt verbalized understanding. Discharge instruction summary: Diet- carb control, Activity- as tolerated, Primary Care Physician as follows: Bradly Alicia [phone] f/u appointment to be scheduled by pt., immunizations reviewed and up to date, prescription medications filled and sent to pharmacy. Inpatient surgical procedure precautions reviewed. 1. WEIGHT: Admit Weight: 250 lb (113.4 kg) (04/09/22 1018)        Today  Weight: 250 lb (113.4 kg) (04/09/22 1018)       2. O2 SAT.: SpO2: 94 % (04/10/22 1630)    Response- Pt belongings gathered, IV removed. Disposition is home (no HHC/DME needs), transported with belongings, taken to lobby via w/c w/ belongings and spouse, no complications.

## 2022-04-11 ENCOUNTER — CARE COORDINATION (OUTPATIENT)
Dept: CASE MANAGEMENT | Age: 57
End: 2022-04-11

## 2022-04-11 NOTE — CARE COORDINATION
[facility] Transitions Initial Follow Up Call    Call within 2 business days of discharge: Yes    Patient: Anh Dawkins Patient : 1965   MRN: <[id number]>  Reason for Admission: ureteral stone, ONOFRE, s/p ureteral stent  Discharge Date: 4/10/22 RARS: Readmission Risk Score: 7.3 ( )      Last Discharge [address] [id number]       Complaint Diagnosis Description Type Department Provider    22 Abdominal Pain Kidney stone . .. ED to Hosp-Admission (Discharged) (ADMITTED) [facility] 3T Daisy Rolle MD; Price Minor. .. Spoke with: LEAH    Facility: [facility]     Attempted to reach patient via phone for initial post hospital transition call. VM left stating purpose of call along with my contact information requesting a return call.    Lennie Vo LPN [facility]  Care Transitions  [phone]    Care Transitions 24 Hour Call    Care Transitions Interventions         Follow Up  Future Appointments   Date Time Provider Elizabeth Rosenbaum   2022  1:00 PM MD YAEL Graves LPN

## 2022-04-12 ENCOUNTER — CARE COORDINATION (OUTPATIENT)
Dept: CASE MANAGEMENT | Age: 57
End: 2022-04-12

## 2022-04-12 NOTE — CARE COORDINATION
[facility] Transitions Initial Follow Up Call    Call within 2 business days of discharge: Yes    Patient: Bigg Rg Patient : 1965   MRN: <[id number]>  Reason for Admission: L ureteral stone  Discharge Date: 4/10/22 RARS: Readmission Risk Score: 7.3 ( )      Last Discharge [facility]       Complaint Diagnosis Description Type Department Provider    22 Abdominal Pain Kidney stone . .. ED to Hosp-Admission (Discharged) (ADMITTED) [facility] 3T Sam Davis MD; Timmy Singh. .. Second attempt to reach pt for follow up call. Left message requesting call back.       Care Transitions 24 Hour Call    Care Transitions Interventions         Follow Up  Future Appointments   Date Time Provider Elizabeth Rosenbaum   2022  1:00 PM MD YAEL DuenasO CARLOS Layton

## 2022-04-13 LAB
BLOOD CULTURE, ROUTINE: NORMAL
CULTURE, BLOOD 2: NORMAL

## 2022-04-17 NOTE — DISCHARGE SUMMARY
Hospital Medicine Discharge Summary    Patient ID: Anna Flower      Patient's PCP: Monika Mancia MD    Admit Date: 4/9/2022     Discharge Date: 4/10/2022      Admitting Provider: Tiana Leon MD     Discharge Provider: Wanda Julien MD     Discharge Diagnoses: Active Hospital Problems    Diagnosis     Nephrolithiasis [N20.0]        The patient was seen and examined on day of discharge and this discharge summary is in conjunction with any daily progress note from day of discharge. Hospital Course: This is 71-year-old male admitted with a nausea vomiting and abdominal pain since Thursday afternoon CT of the abdomen and pelvis with left obstructive uropathy related to an 8 mm stone in the left mid ureter, acute kidney injury nephrology consulted is status post cystoscopy and left ureteral stent placement on 4/9/2022.  1.  This is a 71-year-old male admitted with a left obstructive uropathy urology consulted is status post cystoscopy and left ureteral stent placement on 4/9/2022 continue with IV Rocephin and IV fluid, Flomax. 2.  Acute kidney injury continue with IV hydration improving check BMP now if continues to improve possible discharge home discussed with the patient and family as they are anxious to be discharged home today. Repeat creat=1.1  3. Hypertension continue with home medication. 4.  Diabetes mellitus type 2 p.o. medication on hold continue with the sliding scale and diabetic diet, hemoglobin A1c= 6.1 on 4/9/2022        Physical Exam Performed:     /69   Pulse 105   Temp 97.5 °F (36.4 °C) (Oral)   Resp 16   Ht 5' 8.5\" (1.74 m)   Wt 250 lb (113.4 kg)   SpO2 94%   BMI 37.46 kg/m²       General appearance:  No apparent distress, appears stated age and cooperative. HEENT:  Normal cephalic, atraumatic without obvious deformity. Pupils equal, round, and reactive to light. Extra ocular muscles intact. Conjunctivae/corneas clear.   Neck: Supple, with full range of motion. No jugular venous distention. Trachea midline. Respiratory:  Normal respiratory effort. Clear to auscultation, bilaterally without Rales/Wheezes/Rhonchi. Cardiovascular:  Regular rate and rhythm with normal S1/S2 without murmurs, rubs or gallops. Abdomen: Soft, non-tender, non-distended with normal bowel sounds. Musculoskeletal:  No clubbing, cyanosis or edema bilaterally. Full range of motion without deformity. Skin: Skin color, texture, turgor normal.  No rashes or lesions. Neurologic:  Neurovascularly intact without any focal sensory/motor deficits. Cranial nerves: II-XII intact, grossly non-focal.  Psychiatric:  Alert and oriented, thought content appropriate, normal insight  Capillary Refill: Brisk,< 3 seconds   Peripheral Pulses: +2 palpable, equal bilaterally       Labs: For convenience and continuity at follow-up the following most recent labs are provided:      CBC:    Lab Results   Component Value Date    WBC 7.1 04/10/2022    HGB 14.8 04/10/2022    HCT 43.7 04/10/2022     04/10/2022       Renal:    Lab Results   Component Value Date     04/10/2022    K 4.5 04/10/2022    K 4.2 04/10/2022     04/10/2022    CO2 25 04/10/2022    BUN 22 04/10/2022    CREATININE 1.1 04/10/2022    CALCIUM 8.8 04/10/2022    PHOS 3.6 11/10/2021         Significant Diagnostic Studies    Radiology:   [facility] RETROGRADE PYELOGRAM LEFT   Final Result   Intraprocedural fluoroscopic spot images as above. See separate procedure   note for more information. CT ABDOMEN PELVIS WO CONTRAST Additional Contrast? None   Final Result   Left obstructive uropathy related to an 8 mm stone in the left mid ureter. Consults:     IP CONSULT TO UROLOGY  IP CONSULT TO HOSPITALIST  IP CONSULT TO RESPIRATORY CARE    Disposition:  Home    Condition at Discharge: Stable.     Discharge Instructions/Follow-up: Follow-up with urology as instructed    Code Status: fFull Code    Activity: activity as tolerated    Diet: Diabetic      Discharge Medications:     Discharge Medication List as of 4/10/2022  5:33 PM           Details   amLODIPine (NORVASC) 5 MG tablet TAKE 1 TABLET BY MOUTH EVERY DAY, Disp-90 tablet, R-1Normal      glimepiride (AMARYL) 2 MG tablet TAKE 1 TABLET BY MOUTH EVERY DAY IN THE MORNING, Disp-90 tablet, R-1Normal      azelastine (ASTELIN) 0.1 % nasal spray 1 spray by Nasal route 2 times daily Use in each nostril as directed, Disp-1 Bottle, R-5Normal      aspirin 81 MG EC tablet Take 81 mg by mouth dailyHistorical Med      hyoscyamine (LEVSIN) 125 MCG tablet Take 1 tablet by mouth every 4 hours as needed for Cramping, Disp-50 tablet, R-3Normal      albuterol sulfate HFA (PROAIR HFA) 108 (90 Base) MCG/ACT inhaler Inhale 2 puffs into the lungs 4 times daily as needed for Wheezing, Disp-3 Inhaler, R-2Normal      glucose blood VI test strips (ONE TOUCH ULTRA TEST) strip Disp-100 each, R-3, NormalTests once daily      Blood Glucose Monitoring Suppl (ONE TOUCH ULTRA) by Does not apply route. famotidine (PEPCID) 20 MG tablet Take 20 mg by mouth as needed Historical Med             Time Spent on discharge is more than 35 min in the examination, evaluation, counseling and review of medications and discharge plan. Signed:    Mary Alice Lyn MD   4/17/2022      Thank you Mark Kamara MD for the opportunity to be involved in this patient's care. If you have any questions or concerns please feel free to contact me at [phone].

## 2022-06-06 ENCOUNTER — HOSPITAL ENCOUNTER (OUTPATIENT)
Age: 57
Discharge: HOME | End: 2022-06-06
Payer: COMMERCIAL

## 2022-06-06 DIAGNOSIS — N17.9 ACUTE RENAL FAILURE, UNSPECIFIED ACUTE RENAL FAILURE TYPE (HCC): ICD-10-CM

## 2022-06-06 LAB
A/G RATIO: 1.5 (ref 1.1–2.2)
ALBUMIN SERPL-MCNC: 4.5 G/DL (ref 3.4–5)
ALP BLD-CCNC: 61 U/L (ref 40–129)
ALT SERPL-CCNC: 43 U/L (ref 10–40)
ANION GAP SERPL CALCULATED.3IONS-SCNC: 15 MMOL/L (ref 3–16)
AST SERPL-CCNC: 27 U/L (ref 15–37)
BILIRUB SERPL-MCNC: 0.6 MG/DL (ref 0–1)
BUN BLDV-MCNC: 20 MG/DL (ref 7–20)
CALCIUM SERPL-MCNC: 9.9 MG/DL (ref 8.3–10.6)
CHLORIDE BLD-SCNC: 104 MMOL/L (ref 99–110)
CO2: 22 MMOL/L (ref 21–32)
CREAT SERPL-MCNC: 1.1 MG/DL (ref 0.9–1.3)
CREATININE URINE: 147.3 MG/DL (ref 39–259)
GFR AFRICAN AMERICAN: >60
GFR NON-AFRICAN AMERICAN: >60
GLUCOSE BLD-MCNC: 129 MG/DL (ref 70–99)
MICROALBUMIN UR-MCNC: 18.2 MG/DL
MICROALBUMIN/CREAT UR-RTO: 123.6 MG/G (ref 0–30)
PHOSPHORUS: 3.2 MG/DL (ref 2.5–4.9)
POTASSIUM SERPL-SCNC: 4.3 MMOL/L (ref 3.5–5.1)
PROTEIN PROTEIN: 31 MG/DL
PROTEIN/CREAT RATIO: 0.2 MG/DL
SODIUM BLD-SCNC: 141 MMOL/L (ref 136–145)
TOTAL PROTEIN: 7.5 G/DL (ref 6.4–8.2)

## 2022-06-06 PROCEDURE — 82043 UR ALBUMIN QUANTITATIVE: CPT

## 2022-06-06 PROCEDURE — 83036 HEMOGLOBIN GLYCOSYLATED A1C: CPT

## 2022-06-06 PROCEDURE — 80053 COMPREHEN METABOLIC PANEL: CPT

## 2022-06-06 PROCEDURE — 82570 ASSAY OF URINE CREATININE: CPT

## 2022-06-06 PROCEDURE — 84100 ASSAY OF PHOSPHORUS: CPT

## 2022-06-06 PROCEDURE — 84156 ASSAY OF PROTEIN URINE: CPT

## 2022-06-07 LAB
ESTIMATED AVERAGE GLUCOSE: 125.5 MG/DL
HBA1C MFR BLD: 6 %

## 2022-06-20 RX ORDER — GLIMEPIRIDE 2 MG/1
TABLET ORAL
Qty: 30 TABLET | Refills: 0 | Status: ON HOLD | OUTPATIENT
Start: 2022-06-20 | End: 2022-06-26 | Stop reason: HOSPADM

## 2022-06-24 ENCOUNTER — APPOINTMENT (OUTPATIENT)
Dept: CT IMAGING | Age: 57
DRG: 661 | End: 2022-06-24
Payer: COMMERCIAL

## 2022-06-24 ENCOUNTER — HOSPITAL ENCOUNTER (INPATIENT)
Age: 57
LOS: 2 days | Discharge: HOME | DRG: 661 | End: 2022-06-26
Attending: INTERNAL MEDICINE | Admitting: INTERNAL MEDICINE
Payer: COMMERCIAL

## 2022-06-24 DIAGNOSIS — N17.9 AKI (ACUTE KIDNEY INJURY) (HCC): ICD-10-CM

## 2022-06-24 DIAGNOSIS — N20.0 NEPHROLITHIASIS: ICD-10-CM

## 2022-06-24 DIAGNOSIS — N13.2 HYDRONEPHROSIS WITH URETERAL CALCULUS: Primary | ICD-10-CM

## 2022-06-24 DIAGNOSIS — N20.0 KIDNEY STONE: ICD-10-CM

## 2022-06-24 DIAGNOSIS — D35.02 ADENOMA OF LEFT ADRENAL GLAND: ICD-10-CM

## 2022-06-24 LAB
A/G RATIO: 1.4 (ref 1.1–2.2)
ALBUMIN SERPL-MCNC: 4.6 G/DL (ref 3.4–5)
ALP BLD-CCNC: 63 U/L (ref 40–129)
ALT SERPL-CCNC: 45 U/L (ref 10–40)
ANION GAP SERPL CALCULATED.3IONS-SCNC: 11 MMOL/L (ref 3–16)
AST SERPL-CCNC: 35 U/L (ref 15–37)
BACTERIA: ABNORMAL /HPF
BASOPHILS ABSOLUTE: 0.1 K/UL (ref 0–0.2)
BASOPHILS RELATIVE PERCENT: 0.6 %
BILIRUB SERPL-MCNC: 0.7 MG/DL (ref 0–1)
BILIRUBIN URINE: NEGATIVE
BLOOD, URINE: ABNORMAL
BUN BLDV-MCNC: 27 MG/DL (ref 7–20)
CALCIUM SERPL-MCNC: 10.1 MG/DL (ref 8.3–10.6)
CHLORIDE BLD-SCNC: 105 MMOL/L (ref 99–110)
CLARITY: CLEAR
CO2: 25 MMOL/L (ref 21–32)
COLOR: YELLOW
CREAT SERPL-MCNC: 1.6 MG/DL (ref 0.9–1.3)
EOSINOPHILS ABSOLUTE: 0.1 K/UL (ref 0–0.6)
EOSINOPHILS RELATIVE PERCENT: 0.9 %
EPITHELIAL CELLS, UA: 1 /HPF (ref 0–5)
GFR AFRICAN AMERICAN: 55
GFR NON-AFRICAN AMERICAN: 45
GLUCOSE BLD-MCNC: 183 MG/DL (ref 70–99)
GLUCOSE BLD-MCNC: 183 MG/DL (ref 70–99)
GLUCOSE URINE: NEGATIVE MG/DL
HCT VFR BLD CALC: 49.6 % (ref 40.5–52.5)
HEMOGLOBIN: 16.9 G/DL (ref 13.5–17.5)
HYALINE CASTS: 1 /LPF (ref 0–8)
INFLUENZA A: NOT DETECTED
INFLUENZA B: NOT DETECTED
KETONES, URINE: 15 MG/DL
LEUKOCYTE ESTERASE, URINE: NEGATIVE
LIPASE: 52 U/L (ref 13–60)
LYMPHOCYTES ABSOLUTE: 1.5 K/UL (ref 1–5.1)
LYMPHOCYTES RELATIVE PERCENT: 12.6 %
MCH RBC QN AUTO: 32.7 PG (ref 26–34)
MCHC RBC AUTO-ENTMCNC: 34 G/DL (ref 31–36)
MCV RBC AUTO: 96.4 FL (ref 80–100)
MICROSCOPIC EXAMINATION: YES
MONOCYTES ABSOLUTE: 0.7 K/UL (ref 0–1.3)
MONOCYTES RELATIVE PERCENT: 6 %
NEUTROPHILS ABSOLUTE: 9.5 K/UL (ref 1.7–7.7)
NEUTROPHILS RELATIVE PERCENT: 79.9 %
NITRITE, URINE: NEGATIVE
PDW BLD-RTO: 13.9 % (ref 12.4–15.4)
PERFORMED ON: ABNORMAL
PH UA: 5.5 (ref 5–8)
PLATELET # BLD: 201 K/UL (ref 135–450)
PMV BLD AUTO: 8.5 FL (ref 5–10.5)
POTASSIUM SERPL-SCNC: 4.8 MMOL/L (ref 3.5–5.1)
PROTEIN UA: 100 MG/DL
RBC # BLD: 5.15 M/UL (ref 4.2–5.9)
RBC UA: 9 /HPF (ref 0–4)
SARS-COV-2 RNA, RT PCR: NOT DETECTED
SODIUM BLD-SCNC: 141 MMOL/L (ref 136–145)
SPECIFIC GRAVITY UA: 1.02 (ref 1–1.03)
TOTAL PROTEIN: 7.8 G/DL (ref 6.4–8.2)
URINE REFLEX TO CULTURE: ABNORMAL
URINE TYPE: ABNORMAL
UROBILINOGEN, URINE: 0.2 E.U./DL
WBC # BLD: 11.9 K/UL (ref 4–11)
WBC UA: 0 /HPF (ref 0–5)

## 2022-06-24 PROCEDURE — 83690 ASSAY OF LIPASE: CPT

## 2022-06-24 PROCEDURE — 6360000002 HC RX W HCPCS: Performed by: INTERNAL MEDICINE

## 2022-06-24 PROCEDURE — 2580000003 HC RX 258: Performed by: PHYSICIAN ASSISTANT

## 2022-06-24 PROCEDURE — 36415 COLL VENOUS BLD VENIPUNCTURE: CPT

## 2022-06-24 PROCEDURE — 80053 COMPREHEN METABOLIC PANEL: CPT

## 2022-06-24 PROCEDURE — 99285 EMERGENCY DEPT VISIT HI MDM: CPT

## 2022-06-24 PROCEDURE — 6370000000 HC RX 637 (ALT 250 FOR IP): Performed by: INTERNAL MEDICINE

## 2022-06-24 PROCEDURE — 74176 CT ABD & PELVIS W/O CONTRAST: CPT

## 2022-06-24 PROCEDURE — 96374 THER/PROPH/DIAG INJ IV PUSH: CPT

## 2022-06-24 PROCEDURE — 2580000003 HC RX 258: Performed by: INTERNAL MEDICINE

## 2022-06-24 PROCEDURE — 1200000000 HC SEMI PRIVATE

## 2022-06-24 PROCEDURE — 87636 SARSCOV2 & INF A&B AMP PRB: CPT

## 2022-06-24 PROCEDURE — 85025 COMPLETE CBC W/AUTO DIFF WBC: CPT

## 2022-06-24 PROCEDURE — 81001 URINALYSIS AUTO W/SCOPE: CPT

## 2022-06-24 PROCEDURE — 6360000002 HC RX W HCPCS: Performed by: PHYSICIAN ASSISTANT

## 2022-06-24 RX ORDER — MORPHINE SULFATE 4 MG/ML
4 INJECTION, SOLUTION INTRAMUSCULAR; INTRAVENOUS EVERY 4 HOURS PRN
Status: DISCONTINUED | OUTPATIENT
Start: 2022-06-24 | End: 2022-06-26 | Stop reason: HOSPADM

## 2022-06-24 RX ORDER — SODIUM CHLORIDE 9 MG/ML
INJECTION, SOLUTION INTRAVENOUS PRN
Status: DISCONTINUED | OUTPATIENT
Start: 2022-06-24 | End: 2022-06-26 | Stop reason: HOSPADM

## 2022-06-24 RX ORDER — SODIUM CHLORIDE 0.9 % (FLUSH) 0.9 %
5-40 SYRINGE (ML) INJECTION EVERY 12 HOURS SCHEDULED
Status: DISCONTINUED | OUTPATIENT
Start: 2022-06-24 | End: 2022-06-26 | Stop reason: HOSPADM

## 2022-06-24 RX ORDER — 0.9 % SODIUM CHLORIDE 0.9 %
1000 INTRAVENOUS SOLUTION INTRAVENOUS ONCE
Status: COMPLETED | OUTPATIENT
Start: 2022-06-24 | End: 2022-06-24

## 2022-06-24 RX ORDER — INSULIN LISPRO 100 [IU]/ML
0-12 INJECTION, SOLUTION INTRAVENOUS; SUBCUTANEOUS
Status: DISCONTINUED | OUTPATIENT
Start: 2022-06-25 | End: 2022-06-26 | Stop reason: HOSPADM

## 2022-06-24 RX ORDER — ONDANSETRON 2 MG/ML
4 INJECTION INTRAMUSCULAR; INTRAVENOUS ONCE
Status: COMPLETED | OUTPATIENT
Start: 2022-06-24 | End: 2022-06-24

## 2022-06-24 RX ORDER — ONDANSETRON 4 MG/1
4 TABLET, ORALLY DISINTEGRATING ORAL EVERY 8 HOURS PRN
Status: DISCONTINUED | OUTPATIENT
Start: 2022-06-24 | End: 2022-06-26 | Stop reason: HOSPADM

## 2022-06-24 RX ORDER — AMLODIPINE BESYLATE 5 MG/1
5 TABLET ORAL DAILY
Status: DISCONTINUED | OUTPATIENT
Start: 2022-06-24 | End: 2022-06-26 | Stop reason: HOSPADM

## 2022-06-24 RX ORDER — ASPIRIN 81 MG/1
81 TABLET ORAL DAILY
Status: DISCONTINUED | OUTPATIENT
Start: 2022-06-25 | End: 2022-06-26 | Stop reason: HOSPADM

## 2022-06-24 RX ORDER — ONDANSETRON 2 MG/ML
4 INJECTION INTRAMUSCULAR; INTRAVENOUS EVERY 6 HOURS PRN
Status: DISCONTINUED | OUTPATIENT
Start: 2022-06-24 | End: 2022-06-26 | Stop reason: HOSPADM

## 2022-06-24 RX ORDER — ALBUTEROL SULFATE 90 UG/1
2 AEROSOL, METERED RESPIRATORY (INHALATION) EVERY 4 HOURS PRN
Status: DISCONTINUED | OUTPATIENT
Start: 2022-06-24 | End: 2022-06-26 | Stop reason: HOSPADM

## 2022-06-24 RX ORDER — HYDROMORPHONE HYDROCHLORIDE 1 MG/ML
1 INJECTION, SOLUTION INTRAMUSCULAR; INTRAVENOUS; SUBCUTANEOUS ONCE
Status: COMPLETED | OUTPATIENT
Start: 2022-06-24 | End: 2022-06-24

## 2022-06-24 RX ORDER — SODIUM CHLORIDE, SODIUM LACTATE, POTASSIUM CHLORIDE, CALCIUM CHLORIDE 600; 310; 30; 20 MG/100ML; MG/100ML; MG/100ML; MG/100ML
INJECTION, SOLUTION INTRAVENOUS CONTINUOUS
Status: DISCONTINUED | OUTPATIENT
Start: 2022-06-24 | End: 2022-06-26 | Stop reason: HOSPADM

## 2022-06-24 RX ORDER — ALBUTEROL SULFATE 90 UG/1
2 AEROSOL, METERED RESPIRATORY (INHALATION) 4 TIMES DAILY
Status: DISCONTINUED | OUTPATIENT
Start: 2022-06-24 | End: 2022-06-24

## 2022-06-24 RX ORDER — SODIUM CHLORIDE, SODIUM LACTATE, POTASSIUM CHLORIDE, CALCIUM CHLORIDE 600; 310; 30; 20 MG/100ML; MG/100ML; MG/100ML; MG/100ML
INJECTION, SOLUTION INTRAVENOUS CONTINUOUS
Status: DISCONTINUED | OUTPATIENT
Start: 2022-06-24 | End: 2022-06-25 | Stop reason: SDUPTHER

## 2022-06-24 RX ORDER — INSULIN LISPRO 100 [IU]/ML
0-6 INJECTION, SOLUTION INTRAVENOUS; SUBCUTANEOUS NIGHTLY
Status: DISCONTINUED | OUTPATIENT
Start: 2022-06-24 | End: 2022-06-26 | Stop reason: HOSPADM

## 2022-06-24 RX ORDER — ACETAMINOPHEN 325 MG/1
650 TABLET ORAL EVERY 6 HOURS PRN
Status: DISCONTINUED | OUTPATIENT
Start: 2022-06-24 | End: 2022-06-26 | Stop reason: HOSPADM

## 2022-06-24 RX ORDER — POLYETHYLENE GLYCOL 3350 17 G/17G
17 POWDER, FOR SOLUTION ORAL DAILY PRN
Status: DISCONTINUED | OUTPATIENT
Start: 2022-06-24 | End: 2022-06-26 | Stop reason: HOSPADM

## 2022-06-24 RX ORDER — SODIUM CHLORIDE 0.9 % (FLUSH) 0.9 %
5-40 SYRINGE (ML) INJECTION PRN
Status: DISCONTINUED | OUTPATIENT
Start: 2022-06-24 | End: 2022-06-26 | Stop reason: HOSPADM

## 2022-06-24 RX ORDER — ENOXAPARIN SODIUM 100 MG/ML
40 INJECTION SUBCUTANEOUS DAILY
Status: DISCONTINUED | OUTPATIENT
Start: 2022-06-25 | End: 2022-06-26 | Stop reason: HOSPADM

## 2022-06-24 RX ORDER — TAMSULOSIN HYDROCHLORIDE 0.4 MG/1
0.4 CAPSULE ORAL DAILY
Status: DISCONTINUED | OUTPATIENT
Start: 2022-06-24 | End: 2022-06-26 | Stop reason: HOSPADM

## 2022-06-24 RX ORDER — ACETAMINOPHEN 650 MG/1
650 SUPPOSITORY RECTAL EVERY 6 HOURS PRN
Status: DISCONTINUED | OUTPATIENT
Start: 2022-06-24 | End: 2022-06-26 | Stop reason: HOSPADM

## 2022-06-24 RX ADMIN — ONDANSETRON 4 MG: 2 INJECTION INTRAMUSCULAR; INTRAVENOUS at 17:01

## 2022-06-24 RX ADMIN — HYDROMORPHONE HYDROCHLORIDE 1 MG: 1 INJECTION, SOLUTION INTRAMUSCULAR; INTRAVENOUS; SUBCUTANEOUS at 18:39

## 2022-06-24 RX ADMIN — TAMSULOSIN HYDROCHLORIDE 0.4 MG: 0.4 CAPSULE ORAL at 21:38

## 2022-06-24 RX ADMIN — AMLODIPINE BESYLATE 5 MG: 5 TABLET ORAL at 21:38

## 2022-06-24 RX ADMIN — SODIUM CHLORIDE, POTASSIUM CHLORIDE, SODIUM LACTATE AND CALCIUM CHLORIDE: 600; 310; 30; 20 INJECTION, SOLUTION INTRAVENOUS at 21:26

## 2022-06-24 RX ADMIN — SODIUM CHLORIDE 1000 ML: 9 INJECTION, SOLUTION INTRAVENOUS at 18:38

## 2022-06-24 RX ADMIN — ONDANSETRON 4 MG: 2 INJECTION INTRAMUSCULAR; INTRAVENOUS at 21:17

## 2022-06-24 RX ADMIN — MORPHINE SULFATE 4 MG: 4 INJECTION INTRAVENOUS at 21:17

## 2022-06-24 RX ADMIN — INSULIN LISPRO 1 UNITS: 100 INJECTION, SOLUTION INTRAVENOUS; SUBCUTANEOUS at 21:43

## 2022-06-24 RX ADMIN — SODIUM CHLORIDE, PRESERVATIVE FREE 10 ML: 5 INJECTION INTRAVENOUS at 21:16

## 2022-06-24 ASSESSMENT — ENCOUNTER SYMPTOMS
STRIDOR: 0
VOMITING: 1
BACK PAIN: 0
COUGH: 0
CONSTIPATION: 0
DIARRHEA: 0
SHORTNESS OF BREATH: 0
COLOR CHANGE: 0
NAUSEA: 1
WHEEZING: 0
ABDOMINAL PAIN: 1

## 2022-06-24 ASSESSMENT — PAIN DESCRIPTION - LOCATION
LOCATION_2: HEAD
LOCATION: ABDOMEN
LOCATION: ABDOMEN

## 2022-06-24 ASSESSMENT — PAIN SCALES - GENERAL
PAINLEVEL_OUTOF10: 7
PAINLEVEL_OUTOF10: 6

## 2022-06-24 ASSESSMENT — PAIN - FUNCTIONAL ASSESSMENT: PAIN_FUNCTIONAL_ASSESSMENT: 0-10

## 2022-06-24 ASSESSMENT — PAIN DESCRIPTION - INTENSITY: RATING_2: 4

## 2022-06-24 NOTE — ACP (ADVANCE CARE PLANNING)
Advanced Care Planning Note. Purpose of Encounter: Advanced care planning in light of hospitalization  Parties In Attendance: Patient,    Decisional Capacity: Yes  Subjective: Patient  understand that this conversation is to address long term care goal  Objective: With a history of nephrolithiasis admitted to hospital with ONOFRE and nephrolithiasis  Goals of Care Determination: Patient would pursue CPR and Intubation if required.  Unsure about long term ventilation/tracheostomy, would want family to make the decision at the time if required  Code Status: full code  Time spent on Advanced care Plannin minutes  Advanced Care Planning Documents: documented patient's wishes, would like Wife Tiara to make medical decisions if unable to make decisions    Sammie Henderson MD  2022 6:47 PM

## 2022-06-24 NOTE — H&P
HOSPITALISTS HISTORY AND PHYSICAL    6/24/2022 6:45 PM    Patient Information:  Caty Leavitt is a 56 y.o. male [id number]  PCP:  Clara Yuen MD (Tel: [phone] )    Chief complaint:    Chief Complaint   Patient presents with    Abdominal Pain     abdominal pain, chills, n/v. denies diarrhea. headache. started today. History of Present Illness:  Félix Metz is a 56 y.o. male who presented this morning with multiple nausea and vomiting with left-sided abdominal pain that was 6 out of 10 sharp in nature similar to when she previous had kidney stones he had a stone 2 months ago and one 1 year and a half ago. Patient denies any fever but is having chills no chest pain or shortness of breath patient does not smoke or drink. REVIEW OF SYSTEMS:   Constitutional: see above  ENT: Negative for rhinorrhea, epistaxis, hoarseness, sore throat. Respiratory: Negative for shortness of breath,wheezing  Cardiovascular: Negative for chest pain, palpitations   Gastrointestinal see above  Genitourinary: Negative for polyuria, dysuria   Hematologic/Lymphatic: Negative for bleeding tendency, easy bruising  Musculoskeletal: Negative for myalgias and arthralgias  Neurologic: Negative for confusion,dysarthria. Skin: Negative for itching,rash  Psychiatric: Negative for depression,anxiety, agitation. Endocrine: Negative for polydipsia,polyuria,heat /cold intolerance. Past Medical History:   has a past medical history of Diabetes mellitus ([facility] [address] Hypertension, Obesity, unspecified, MARIA A on CPAP, Other abnormal blood chemistry, Seasonal allergies, and Unspecified sleep apnea. Past Surgical History:   has a past surgical history that includes Cataract removal with implant (2009, 2010); Tooth Extraction (08/2018); Cystoscopy (Left, 8/24/2021); Cystoscopy (Left, 9/2/2021); and Cystoscopy (Left, 4/9/2022). Medications:  No current facility-administered medications on file prior to encounter. Current Outpatient Medications on File Prior to Encounter   Medication Sig Dispense Refill    glimepiride (AMARYL) 2 MG tablet TAKE 1 TABLET BY MOUTH EVERY DAY IN THE MORNING--patient needs appointment for additional refills (Patient not taking: Reported on 6/24/2022) 30 tablet 0    amLODIPine (NORVASC) 5 MG tablet TAKE 1 TABLET BY MOUTH EVERY DAY 90 tablet 1    azelastine (ASTELIN) 0.1 % nasal spray 1 spray by Nasal route 2 times daily Use in each nostril as directed (Patient taking differently: 1 spray by Nasal route as needed Use in each nostril as directed) 1 Bottle 5    aspirin 81 MG EC tablet Take 81 mg by mouth daily      hyoscyamine (LEVSIN) 125 MCG tablet Take 1 tablet by mouth every 4 hours as needed for Cramping (Patient taking differently: Take 125 mcg by mouth as needed for Cramping ) 50 tablet 3    albuterol sulfate HFA (PROAIR HFA) 108 (90 Base) MCG/ACT inhaler Inhale 2 puffs into the lungs 4 times daily as needed for Wheezing 3 Inhaler 2    glucose blood VI test strips (ONE TOUCH ULTRA TEST) strip Tests once daily 100 each 3    Blood Glucose Monitoring Suppl (ONE TOUCH ULTRA) by Does not apply route.  famotidine (PEPCID) 20 MG tablet Take 20 mg by mouth as needed          Allergies: Allergies   Allergen Reactions    Ace Inhibitors      COUGH      Pollen Extract         Social History:  Patient Lives at home   reports that he has never smoked. He has never used smokeless tobacco. He reports current alcohol use. He reports that he does not use drugs. Family History:  family history includes Arthritis in his mother; High Blood Pressure in his father; Kidney stones in his mother; Other in his father and mother. ,     Physical Exam:  BP (!) 151/87   Pulse 82   Temp 97.4 °F (36.3 °C) (Temporal)   Resp 18   SpO2 95%     General appearance:  Appears comfortable.  AAOx3  HEENT: atraumatic, Pupils equal, muscous membranes moist, no masses appreciated  Cardiovascular: Regular rate and rhythm no murmurs appreciated  Respiratory: CTAB no wheezing  Gastrointestinal: Abdomen soft, non-tender, BS+  EXT: no edema  Neurology: no gross focal deficts  Psychiatry: Appropriate affect. Not agitated  Skin: Warm, dry, no rashes appreciated    Labs:  CBC:   Lab Results   Component Value Date    WBC 11.9 06/24/2022    RBC 5.15 06/24/2022    HGB 16.9 06/24/2022    HCT 49.6 06/24/2022    MCV 96.4 06/24/2022    MCH 32.7 06/24/2022    MCHC 34.0 06/24/2022    RDW 13.9 06/24/2022     06/24/2022    MPV 8.5 06/24/2022     BMP:    Lab Results   Component Value Date     06/24/2022    K 4.8 06/24/2022    K 4.2 04/10/2022     06/24/2022    CO2 25 06/24/2022    BUN 27 06/24/2022    CREATININE 1.6 06/24/2022    CALCIUM 10.1 06/24/2022    GFRAA 55 06/24/2022    GFRAA >60 03/01/2012    LABGLOM 45 06/24/2022    GLUCOSE 183 06/24/2022     CT ABDOMEN PELVIS WO CONTRAST Additional Contrast? None   Final Result   8 mm stone lodged in the proximal left ureter which is unchanged in position   and is causing persistent moderate obstruction which is slightly more   prominent with increasing stranding around the kidney. Recommend urological   follow-up      Multiple renal stones scattered in the left kidney and a 3 cm complex cyst   upper pole on the left with calcific debris in the cyst which is unchanged. 3 cm cyst lower pole left kidney which is unchanged. Tiny renal stones on the right which are better visualized with no   hydronephrosis or urinary obstruction on the right      Mild chronic liver changes with fatty replacement throughout. Normal appendix with no pelvic mass or active inflammation. 1 cm left adrenal adenoma which is unchanged.              Recent imaging reviewed    Problem List  Principal Problem:    ONOFRE (acute kidney injury) ([facility] [address]  Resolved Problems:    * No resolved hospital problems. *        Assessment/Plan:   ONOFRE secondary to left nephrolithiasis  - ivf  - flomax  -Iv morphine prn  - urology consult  - check ua    Dm2: SSI    Htn: home meds      DVT prophylaxis lovenox  Code status full code        Admit as inpatient I anticipate hospitalization spanning more than two midnights for investigation and treatment of the above medically necessary diagnoses. Please note that some part of this chart was generated using Dragon dictation software. Although every effort was made to ensure the accuracy of this automated transcription, some errors in transcription may have occurred inadvertently. If you may need any clarification, please do not hesitate to contact me through [facility].        Jillian Dakins, MD    6/24/2022 6:45 PM

## 2022-06-24 NOTE — ED PROVIDER NOTES
[address] [facility]        Pt Name: Michelle Corral  MRN: [id number]  [place] 1965  Date of evaluation: 6/24/2022  Provider: Kiya Bullock PA-C  PCP: Wendy José MD  Note Started: 4:19 PM EDT       BRENNAN. I have evaluated this patient. My supervising physician was available for consultation. CHIEF COMPLAINT       Chief Complaint   Patient presents with    Abdominal Pain     abdominal pain, chills, n/v. denies diarrhea. headache. started today. HISTORY OF PRESENT ILLNESS   (Location, Timing/Onset, Context/Setting, Quality, Duration, Modifying Factors, Severity, Associated Signs and Symptoms)  Note limiting factors. Chief Complaint: Abdominal pain, nausea and vomiting    Michelle Corral is a 56 y.o. male who presents to the emergency department stating that he woke up this morning with mid to lower abdominal pain, nausea and vomiting. Last night he got home from work around 2 AM.  He ate a slice of pizza at home before going to bed. Patient denies diarrhea, constipation, chest pain, shortness of breath, cough, fever. Does report chills. Denies bloody or black stool. Denies bloody, bilious or coffee-ground emesis. Rates his pain to be a 6 out of 10 on pain scale. Nursing Notes were all reviewed and agreed with or any disagreements were addressed in the HPI. REVIEW OF SYSTEMS    (2-9 systems for level 4, 10 or more for level 5)     Review of Systems   Constitutional: Positive for chills. Negative for fever. HENT: Negative. Eyes: Negative for visual disturbance. Respiratory: Negative for cough, shortness of breath, wheezing and stridor. Cardiovascular: Negative for chest pain, palpitations and leg swelling. Gastrointestinal: Positive for abdominal pain, nausea and vomiting. Negative for constipation and diarrhea. Genitourinary: Negative.     Musculoskeletal: Negative for back pain, neck pain and neck stiffness. Skin: Negative for color change, pallor, rash and wound. Neurological: Negative for dizziness, tremors, seizures, syncope, facial asymmetry, speech difficulty, weakness, light-headedness, numbness and headaches. Psychiatric/Behavioral: Negative for confusion. All other systems reviewed and are negative. Positives and Pertinent negatives as per HPI. Except as noted above in the ROS, all other systems were reviewed and negative. PAST MEDICAL HISTORY     Past Medical History:   Diagnosis Date    Diabetes mellitus ([address]     Hypertension     Obesity, unspecified     MARIA A on CPAP     Other abnormal blood chemistry     Seasonal allergies     Unspecified sleep apnea          SURGICAL HISTORY     Past Surgical History:   Procedure Laterality Date    CATARACT REMOVAL WITH IMPLANT  2009, 2010    B eyes    CYSTOSCOPY Left 8/24/2021    CYSTOSCOPY, LEFT RETROGRADE PYELOGRAM, PLACEMENT OF LEFT URETERAL STENT performed by Jose Alberto Fields MD at [facility] Left 9/2/2021    CYSTOSCOPY WITH LEFT URETEROSCOPY WITH HOLMIUM LASER LITHOTRIPSY, STONE MANIPULATION AND LEFT STENT REMOVAL performed by Jose Alberto Fields MD at [facility] 4/9/2022    CYSTOSCOPY LEFT URETERAL STENT INSERTION performed by Jose Alberto Fields MD at [facility]  08/2018         CURRENTMEDICATIONS       Previous Medications    ALBUTEROL SULFATE HFA (PROAIR HFA) 108 (90 BASE) MCG/ACT INHALER    Inhale 2 puffs into the lungs 4 times daily as needed for Wheezing    AMLODIPINE (NORVASC) 5 MG TABLET    TAKE 1 TABLET BY MOUTH EVERY DAY    ASPIRIN 81 MG EC TABLET    Take 81 mg by mouth daily    AZELASTINE (ASTELIN) 0.1 % NASAL SPRAY    1 spray by Nasal route 2 times daily Use in each nostril as directed    BLOOD GLUCOSE MONITORING SUPPL (ONE TOUCH ULTRA)    by Does not apply route.       FAMOTIDINE (PEPCID) 20 MG TABLET    Take 20 mg by mouth as needed     GLIMEPIRIDE (AMARYL) 2 Palpations: Abdomen is soft. There is no pulsatile mass. Tenderness: There is abdominal tenderness in the periumbilical area. There is no right CVA tenderness, left CVA tenderness, guarding or rebound. Negative signs include Ackerman's sign, Rovsing's sign, McBurney's sign, psoas sign and obturator sign. Musculoskeletal:         General: Normal range of motion. Cervical back: Normal range of motion. Skin:     General: Skin is warm and dry. Coloration: Skin is not jaundiced or pale. Findings: No bruising, erythema, lesion or rash. Neurological:      General: No focal deficit present. Mental Status: He is alert and oriented to person, place, and time. Psychiatric:         Behavior: Behavior normal.         DIAGNOSTIC RESULTS   LABS:    Labs Reviewed   CBC WITH AUTO DIFFERENTIAL - Abnormal; Notable for the following components:       Result Value    WBC 11.9 (*)     Neutrophils Absolute 9.5 (*)     All other components within normal limits   COMPREHENSIVE METABOLIC PANEL - Abnormal; Notable for the following components:    Glucose 183 (*)     BUN 27 (*)     CREATININE 1.6 (*)     GFR Non- 45 (*)     GFR  55 (*)     ALT 45 (*)     All other components within normal limits   COVID-19 & INFLUENZA COMBO   LIPASE   URINALYSIS WITH REFLEX TO CULTURE       When ordered only abnormal lab results are displayed. All other labs were within normal range or not returned as of this dictation. EKG: When ordered, EKG's are interpreted by the Emergency Department Physician in the absence of a cardiologist.  Please see their note for interpretation of EKG.     RADIOLOGY:   Non-plain film images such as CT, Ultrasound and MRI are read by the radiologist. Plain radiographic images are visualized and preliminarily interpreted by the ED Provider with the below findings:        Interpretation per the Radiologist below, if available at the time of this note:    CT ABDOMEN PELVIS WO CONTRAST Additional Contrast? None   Final Result   8 mm stone lodged in the proximal left ureter which is unchanged in position   and is causing persistent moderate obstruction which is slightly more   prominent with increasing stranding around the kidney. Recommend urological   follow-up      Multiple renal stones scattered in the left kidney and a 3 cm complex cyst   upper pole on the left with calcific debris in the cyst which is unchanged. 3 cm cyst lower pole left kidney which is unchanged. Tiny renal stones on the right which are better visualized with no   hydronephrosis or urinary obstruction on the right      Mild chronic liver changes with fatty replacement throughout. Normal appendix with no pelvic mass or active inflammation. 1 cm left adrenal adenoma which is unchanged. PROCEDURES   Unless otherwise noted below, none     Procedures    CRITICAL CARE TIME   n/a    CONSULTS:  IP CONSULT TO UROLOGY  IP CONSULT TO HOSPITALIST  I spoke with Dr Dennis Armando at 5490. Recommends Npo after midnight    EMERGENCY DEPARTMENT COURSE and DIFFERENTIAL DIAGNOSIS/MDM:   Vitals:    Vitals:    06/24/22 1522   BP: (!) 151/87   Pulse: 82   Resp: 18   Temp: 97.4 °F (36.3 °C)   TempSrc: Temporal   SpO2: 95%       Patient was given the following medications:  Medications   0.9 % sodium chloride bolus (has no administration in time range)   HYDROmorphone HCl PF (DILAUDID) injection 1 mg (has no administration in time range)   ondansetron (ZOFRAN) injection 4 mg (4 mg IntraVENous Given 6/24/22 1701)         Is this patient to be included in the SEP-1 Core Measure due to severe sepsis or septic shock? No   Exclusion criteria - the patient is NOT to be included for SEP-1 Core Measure due to:  2+ SIRS criteria are not met    This patient presents complaining of abdominal pain, nausea and vomiting. CT scan shows evidence of 8 mm left proximal ureteral calculus.   Patient also has acute kidney injury. Urine pending. Patient unable to provide a sample at this time. I did order medications to help alleviate symptoms and IV fluids for ONOFRE. Consulted with urology. Plan for hospitalist admission. Patient understands and agrees with plan for admission. He was informed of other incidental findings on ct. FINAL IMPRESSION      1. Hydronephrosis with ureteral calculus    2. ONOFRE (acute kidney injury) ([facility] [address]    3. Kidney stone    4. Adenoma of left adrenal gland          DISPOSITION/PLAN   DISPOSITION Admitted 06/24/2022 06:10:14 PM      PATIENT REFERRED TO:  No follow-up provider specified.     DISCHARGE MEDICATIONS:  New Prescriptions    No medications on file       DISCONTINUED MEDICATIONS:  Discontinued Medications    No medications on file              (Please note that portions of this note were completed with a voice recognition program.  Efforts were made to edit the dictations but occasionally words are mis-transcribed.)    Caryle Louis, PA-C (electronically signed)           Caryle Louis, PA-C  06/24/22 1945

## 2022-06-25 ENCOUNTER — ANESTHESIA (OUTPATIENT)
Dept: OPERATING ROOM | Age: 57
DRG: 661 | End: 2022-06-25
Payer: COMMERCIAL

## 2022-06-25 ENCOUNTER — ANESTHESIA EVENT (OUTPATIENT)
Dept: OPERATING ROOM | Age: 57
DRG: 661 | End: 2022-06-25
Payer: COMMERCIAL

## 2022-06-25 ENCOUNTER — APPOINTMENT (OUTPATIENT)
Dept: GENERAL RADIOLOGY | Age: 57
DRG: 661 | End: 2022-06-25
Payer: COMMERCIAL

## 2022-06-25 LAB
ANION GAP SERPL CALCULATED.3IONS-SCNC: 9 MMOL/L (ref 3–16)
BASOPHILS ABSOLUTE: 0 K/UL (ref 0–0.2)
BASOPHILS RELATIVE PERCENT: 0.3 %
BUN BLDV-MCNC: 31 MG/DL (ref 7–20)
CALCIUM SERPL-MCNC: 9.3 MG/DL (ref 8.3–10.6)
CHLORIDE BLD-SCNC: 102 MMOL/L (ref 99–110)
CO2: 27 MMOL/L (ref 21–32)
CREAT SERPL-MCNC: 2.2 MG/DL (ref 0.9–1.3)
EOSINOPHILS ABSOLUTE: 0 K/UL (ref 0–0.6)
EOSINOPHILS RELATIVE PERCENT: 0.1 %
GFR AFRICAN AMERICAN: 38
GFR NON-AFRICAN AMERICAN: 31
GLUCOSE BLD-MCNC: 110 MG/DL (ref 70–99)
GLUCOSE BLD-MCNC: 143 MG/DL (ref 70–99)
GLUCOSE BLD-MCNC: 146 MG/DL (ref 70–99)
GLUCOSE BLD-MCNC: 272 MG/DL (ref 70–99)
GLUCOSE BLD-MCNC: 283 MG/DL (ref 70–99)
HCT VFR BLD CALC: 46.4 % (ref 40.5–52.5)
HEMOGLOBIN: 15.7 G/DL (ref 13.5–17.5)
LYMPHOCYTES ABSOLUTE: 1.3 K/UL (ref 1–5.1)
LYMPHOCYTES RELATIVE PERCENT: 11.1 %
MCH RBC QN AUTO: 33 PG (ref 26–34)
MCHC RBC AUTO-ENTMCNC: 33.8 G/DL (ref 31–36)
MCV RBC AUTO: 97.5 FL (ref 80–100)
MONOCYTES ABSOLUTE: 0.3 K/UL (ref 0–1.3)
MONOCYTES RELATIVE PERCENT: 2.7 %
NEUTROPHILS ABSOLUTE: 9.9 K/UL (ref 1.7–7.7)
NEUTROPHILS RELATIVE PERCENT: 85.8 %
PDW BLD-RTO: 14.3 % (ref 12.4–15.4)
PERFORMED ON: ABNORMAL
PLATELET # BLD: 200 K/UL (ref 135–450)
PMV BLD AUTO: 8.9 FL (ref 5–10.5)
POTASSIUM REFLEX MAGNESIUM: 4.6 MMOL/L (ref 3.5–5.1)
RBC # BLD: 4.75 M/UL (ref 4.2–5.9)
SODIUM BLD-SCNC: 138 MMOL/L (ref 136–145)
WBC # BLD: 11.6 K/UL (ref 4–11)

## 2022-06-25 PROCEDURE — 6370000000 HC RX 637 (ALT 250 FOR IP): Performed by: UROLOGY

## 2022-06-25 PROCEDURE — 0T778DZ DILATION OF LEFT URETER WITH INTRALUMINAL DEVICE, VIA NATURAL OR ARTIFICIAL OPENING ENDOSCOPIC: ICD-10-PCS | Performed by: UROLOGY

## 2022-06-25 PROCEDURE — 2580000003 HC RX 258: Performed by: INTERNAL MEDICINE

## 2022-06-25 PROCEDURE — 2580000003 HC RX 258: Performed by: UROLOGY

## 2022-06-25 PROCEDURE — 3600000014 HC SURGERY LEVEL 4 ADDTL 15MIN: Performed by: UROLOGY

## 2022-06-25 PROCEDURE — 80048 BASIC METABOLIC PNL TOTAL CA: CPT

## 2022-06-25 PROCEDURE — 2500000003 HC RX 250 WO HCPCS: Performed by: ANESTHESIOLOGY

## 2022-06-25 PROCEDURE — 3700000001 HC ADD 15 MINUTES (ANESTHESIA): Performed by: UROLOGY

## 2022-06-25 PROCEDURE — 3700000000 HC ANESTHESIA ATTENDED CARE: Performed by: UROLOGY

## 2022-06-25 PROCEDURE — BT1F1ZZ FLUOROSCOPY OF LEFT KIDNEY, URETER AND BLADDER USING LOW OSMOLAR CONTRAST: ICD-10-PCS | Performed by: UROLOGY

## 2022-06-25 PROCEDURE — 36415 COLL VENOUS BLD VENIPUNCTURE: CPT

## 2022-06-25 PROCEDURE — 2709999900 HC NON-CHARGEABLE SUPPLY: Performed by: UROLOGY

## 2022-06-25 PROCEDURE — 1200000000 HC SEMI PRIVATE

## 2022-06-25 PROCEDURE — 85025 COMPLETE CBC W/AUTO DIFF WBC: CPT

## 2022-06-25 PROCEDURE — 74420 UROGRAPHY RTRGR +-KUB: CPT

## 2022-06-25 PROCEDURE — 7100000001 HC PACU RECOVERY - ADDTL 15 MIN: Performed by: UROLOGY

## 2022-06-25 PROCEDURE — 3600000004 HC SURGERY LEVEL 4 BASE: Performed by: UROLOGY

## 2022-06-25 PROCEDURE — 6360000002 HC RX W HCPCS: Performed by: ANESTHESIOLOGY

## 2022-06-25 PROCEDURE — 6360000004 HC RX CONTRAST MEDICATION: Performed by: UROLOGY

## 2022-06-25 PROCEDURE — 6360000002 HC RX W HCPCS: Performed by: UROLOGY

## 2022-06-25 PROCEDURE — 7100000000 HC PACU RECOVERY - FIRST 15 MIN: Performed by: UROLOGY

## 2022-06-25 PROCEDURE — C1769 GUIDE WIRE: HCPCS | Performed by: UROLOGY

## 2022-06-25 PROCEDURE — C2617 STENT, NON-COR, TEM W/O DEL: HCPCS | Performed by: UROLOGY

## 2022-06-25 DEVICE — STENT URET 6FR L26CM PERCFLX HYDR+ TAPR TIP GRAD: Type: IMPLANTABLE DEVICE | Site: URETER | Status: FUNCTIONAL

## 2022-06-25 RX ORDER — MAGNESIUM HYDROXIDE 1200 MG/15ML
LIQUID ORAL
Status: COMPLETED | OUTPATIENT
Start: 2022-06-25 | End: 2022-06-25

## 2022-06-25 RX ORDER — MIDAZOLAM HYDROCHLORIDE 1 MG/ML
INJECTION INTRAMUSCULAR; INTRAVENOUS PRN
Status: DISCONTINUED | OUTPATIENT
Start: 2022-06-25 | End: 2022-06-25 | Stop reason: SDUPTHER

## 2022-06-25 RX ORDER — HYDRALAZINE HYDROCHLORIDE 20 MG/ML
10 INJECTION INTRAMUSCULAR; INTRAVENOUS
Status: DISCONTINUED | OUTPATIENT
Start: 2022-06-25 | End: 2022-06-25 | Stop reason: HOSPADM

## 2022-06-25 RX ORDER — TAMSULOSIN HYDROCHLORIDE 0.4 MG/1
0.4 CAPSULE ORAL DAILY
Status: DISCONTINUED | OUTPATIENT
Start: 2022-06-25 | End: 2022-06-25 | Stop reason: SDUPTHER

## 2022-06-25 RX ORDER — CEFAZOLIN SODIUM 1 G/3ML
INJECTION, POWDER, FOR SOLUTION INTRAMUSCULAR; INTRAVENOUS PRN
Status: DISCONTINUED | OUTPATIENT
Start: 2022-06-25 | End: 2022-06-25 | Stop reason: SDUPTHER

## 2022-06-25 RX ORDER — HALOPERIDOL 5 MG/ML
1 INJECTION INTRAMUSCULAR
Status: DISCONTINUED | OUTPATIENT
Start: 2022-06-25 | End: 2022-06-25 | Stop reason: HOSPADM

## 2022-06-25 RX ORDER — FENTANYL CITRATE 50 UG/ML
INJECTION, SOLUTION INTRAMUSCULAR; INTRAVENOUS PRN
Status: DISCONTINUED | OUTPATIENT
Start: 2022-06-25 | End: 2022-06-25 | Stop reason: SDUPTHER

## 2022-06-25 RX ORDER — LABETALOL HYDROCHLORIDE 5 MG/ML
10 INJECTION, SOLUTION INTRAVENOUS
Status: DISCONTINUED | OUTPATIENT
Start: 2022-06-25 | End: 2022-06-25 | Stop reason: HOSPADM

## 2022-06-25 RX ORDER — DEXAMETHASONE SODIUM PHOSPHATE 4 MG/ML
INJECTION, SOLUTION INTRA-ARTICULAR; INTRALESIONAL; INTRAMUSCULAR; INTRAVENOUS; SOFT TISSUE PRN
Status: DISCONTINUED | OUTPATIENT
Start: 2022-06-25 | End: 2022-06-25 | Stop reason: SDUPTHER

## 2022-06-25 RX ORDER — SODIUM CHLORIDE 9 MG/ML
25 INJECTION, SOLUTION INTRAVENOUS PRN
Status: DISCONTINUED | OUTPATIENT
Start: 2022-06-25 | End: 2022-06-25 | Stop reason: HOSPADM

## 2022-06-25 RX ORDER — OXYBUTYNIN CHLORIDE 5 MG/1
5 TABLET ORAL 3 TIMES DAILY
Status: DISCONTINUED | OUTPATIENT
Start: 2022-06-25 | End: 2022-06-26 | Stop reason: HOSPADM

## 2022-06-25 RX ORDER — LIDOCAINE HYDROCHLORIDE 20 MG/ML
JELLY TOPICAL
Status: COMPLETED | OUTPATIENT
Start: 2022-06-25 | End: 2022-06-25

## 2022-06-25 RX ORDER — SODIUM CHLORIDE 0.9 % (FLUSH) 0.9 %
5-40 SYRINGE (ML) INJECTION EVERY 12 HOURS SCHEDULED
Status: DISCONTINUED | OUTPATIENT
Start: 2022-06-25 | End: 2022-06-25 | Stop reason: HOSPADM

## 2022-06-25 RX ORDER — ONDANSETRON 2 MG/ML
4 INJECTION INTRAMUSCULAR; INTRAVENOUS
Status: DISCONTINUED | OUTPATIENT
Start: 2022-06-25 | End: 2022-06-25 | Stop reason: HOSPADM

## 2022-06-25 RX ORDER — OXYCODONE HYDROCHLORIDE 5 MG/1
10 TABLET ORAL PRN
Status: DISCONTINUED | OUTPATIENT
Start: 2022-06-25 | End: 2022-06-25 | Stop reason: HOSPADM

## 2022-06-25 RX ORDER — SODIUM CHLORIDE 0.9 % (FLUSH) 0.9 %
5-40 SYRINGE (ML) INJECTION PRN
Status: DISCONTINUED | OUTPATIENT
Start: 2022-06-25 | End: 2022-06-25 | Stop reason: HOSPADM

## 2022-06-25 RX ORDER — OXYCODONE HYDROCHLORIDE 5 MG/1
5 TABLET ORAL PRN
Status: DISCONTINUED | OUTPATIENT
Start: 2022-06-25 | End: 2022-06-25 | Stop reason: HOSPADM

## 2022-06-25 RX ORDER — LIDOCAINE HYDROCHLORIDE 20 MG/ML
INJECTION, SOLUTION EPIDURAL; INFILTRATION; INTRACAUDAL; PERINEURAL PRN
Status: DISCONTINUED | OUTPATIENT
Start: 2022-06-25 | End: 2022-06-25 | Stop reason: SDUPTHER

## 2022-06-25 RX ORDER — DIPHENHYDRAMINE HYDROCHLORIDE 50 MG/ML
12.5 INJECTION INTRAMUSCULAR; INTRAVENOUS
Status: DISCONTINUED | OUTPATIENT
Start: 2022-06-25 | End: 2022-06-25 | Stop reason: HOSPADM

## 2022-06-25 RX ORDER — FENTANYL CITRATE 50 UG/ML
50 INJECTION, SOLUTION INTRAMUSCULAR; INTRAVENOUS EVERY 5 MIN PRN
Status: DISCONTINUED | OUTPATIENT
Start: 2022-06-25 | End: 2022-06-25 | Stop reason: HOSPADM

## 2022-06-25 RX ORDER — FENTANYL CITRATE 50 UG/ML
25 INJECTION, SOLUTION INTRAMUSCULAR; INTRAVENOUS EVERY 5 MIN PRN
Status: DISCONTINUED | OUTPATIENT
Start: 2022-06-25 | End: 2022-06-25 | Stop reason: HOSPADM

## 2022-06-25 RX ORDER — PHENAZOPYRIDINE HYDROCHLORIDE 100 MG/1
200 TABLET, FILM COATED ORAL
Status: DISCONTINUED | OUTPATIENT
Start: 2022-06-25 | End: 2022-06-26 | Stop reason: HOSPADM

## 2022-06-25 RX ORDER — ONDANSETRON 2 MG/ML
INJECTION INTRAMUSCULAR; INTRAVENOUS PRN
Status: DISCONTINUED | OUTPATIENT
Start: 2022-06-25 | End: 2022-06-25 | Stop reason: SDUPTHER

## 2022-06-25 RX ORDER — MEPERIDINE HYDROCHLORIDE 25 MG/ML
12.5 INJECTION INTRAMUSCULAR; INTRAVENOUS; SUBCUTANEOUS
Status: DISCONTINUED | OUTPATIENT
Start: 2022-06-25 | End: 2022-06-25 | Stop reason: HOSPADM

## 2022-06-25 RX ORDER — PROPOFOL 10 MG/ML
INJECTION, EMULSION INTRAVENOUS PRN
Status: DISCONTINUED | OUTPATIENT
Start: 2022-06-25 | End: 2022-06-25 | Stop reason: SDUPTHER

## 2022-06-25 RX ADMIN — ASPIRIN 81 MG: 81 TABLET, COATED ORAL at 14:07

## 2022-06-25 RX ADMIN — ONDANSETRON 4 MG: 2 INJECTION INTRAMUSCULAR; INTRAVENOUS at 11:18

## 2022-06-25 RX ADMIN — PROPOFOL 200 MG: 10 INJECTION, EMULSION INTRAVENOUS at 11:13

## 2022-06-25 RX ADMIN — OXYBUTYNIN CHLORIDE 5 MG: 5 TABLET ORAL at 21:00

## 2022-06-25 RX ADMIN — AMLODIPINE BESYLATE 5 MG: 5 TABLET ORAL at 14:06

## 2022-06-25 RX ADMIN — SODIUM CHLORIDE, POTASSIUM CHLORIDE, SODIUM LACTATE AND CALCIUM CHLORIDE: 600; 310; 30; 20 INJECTION, SOLUTION INTRAVENOUS at 06:30

## 2022-06-25 RX ADMIN — LIDOCAINE HYDROCHLORIDE 50 MG: 20 INJECTION, SOLUTION EPIDURAL; INFILTRATION; INTRACAUDAL; PERINEURAL at 11:13

## 2022-06-25 RX ADMIN — ENOXAPARIN SODIUM 40 MG: 100 INJECTION SUBCUTANEOUS at 14:06

## 2022-06-25 RX ADMIN — OXYBUTYNIN CHLORIDE 5 MG: 5 TABLET ORAL at 14:07

## 2022-06-25 RX ADMIN — INSULIN LISPRO 6 UNITS: 100 INJECTION, SOLUTION INTRAVENOUS; SUBCUTANEOUS at 17:43

## 2022-06-25 RX ADMIN — SODIUM CHLORIDE, POTASSIUM CHLORIDE, SODIUM LACTATE AND CALCIUM CHLORIDE: 600; 310; 30; 20 INJECTION, SOLUTION INTRAVENOUS at 18:21

## 2022-06-25 RX ADMIN — CEFAZOLIN 2000 MG: 1 INJECTION, POWDER, FOR SOLUTION INTRAVENOUS at 11:04

## 2022-06-25 RX ADMIN — TAMSULOSIN HYDROCHLORIDE 0.4 MG: 0.4 CAPSULE ORAL at 14:07

## 2022-06-25 RX ADMIN — INSULIN LISPRO 3 UNITS: 100 INJECTION, SOLUTION INTRAVENOUS; SUBCUTANEOUS at 21:05

## 2022-06-25 RX ADMIN — FENTANYL CITRATE 100 MCG: 50 INJECTION, SOLUTION INTRAMUSCULAR; INTRAVENOUS at 11:13

## 2022-06-25 RX ADMIN — MIDAZOLAM 2 MG: 1 INJECTION INTRAMUSCULAR; INTRAVENOUS at 11:04

## 2022-06-25 RX ADMIN — DEXAMETHASONE SODIUM PHOSPHATE 10 MG: 4 INJECTION, SOLUTION INTRAMUSCULAR; INTRAVENOUS at 11:18

## 2022-06-25 RX ADMIN — PHENAZOPYRIDINE HYDROCHLORIDE 200 MG: 100 TABLET ORAL at 17:44

## 2022-06-25 RX ADMIN — PHENAZOPYRIDINE HYDROCHLORIDE 200 MG: 100 TABLET ORAL at 14:08

## 2022-06-25 ASSESSMENT — PAIN - FUNCTIONAL ASSESSMENT: PAIN_FUNCTIONAL_ASSESSMENT: NONE - DENIES PAIN

## 2022-06-25 ASSESSMENT — PAIN DESCRIPTION - ORIENTATION: ORIENTATION: MID

## 2022-06-25 ASSESSMENT — PAIN SCALES - GENERAL
PAINLEVEL_OUTOF10: 3
PAINLEVEL_OUTOF10: 2

## 2022-06-25 ASSESSMENT — PAIN DESCRIPTION - LOCATION: LOCATION: ABDOMEN

## 2022-06-25 ASSESSMENT — PAIN DESCRIPTION - DESCRIPTORS: DESCRIPTORS: ACHING

## 2022-06-25 ASSESSMENT — LIFESTYLE VARIABLES: SMOKING_STATUS: 0

## 2022-06-25 NOTE — ANESTHESIA PRE PROCEDURE
Department of Anesthesiology  Preprocedure Note       Name:  Rosaura Calvert   Age:  56 y.o.  :  1965                                          MRN:  [id number]         Date:  2022      Surgeon: Jeramy Carlos):  Nina Zambrano MD    Procedure: Procedure(s):  CYSTOSCOPY  LEFT URETERAL STENT INSERTION    Medications prior to admission:   Prior to Admission medications    Medication Sig Start Date End Date Taking? Authorizing Provider   glimepiride (AMARYL) 2 MG tablet TAKE 1 TABLET BY MOUTH EVERY DAY IN THE MORNING--patient needs appointment for additional refills  Patient not taking: Reported on 2022   Maria Dolores Modi MD   amLODIPine (NORVASC) 5 MG tablet TAKE 1 TABLET BY MOUTH EVERY DAY 3/28/22   Maria Dolores Modi MD   azelastine (ASTELIN) 0.1 % nasal spray 1 spray by Nasal route 2 times daily Use in each nostril as directed  Patient taking differently: 1 spray by Nasal route as needed Use in each nostril as directed 21   Maria Dolores Modi MD   aspirin 81 MG EC tablet Take 81 mg by mouth daily    Historical Provider, MD   hyoscyamine (LEVSIN) 125 MCG tablet Take 1 tablet by mouth every 4 hours as needed for Cramping  Patient taking differently: Take 125 mcg by mouth as needed for Cramping  19   Patito Ceballos MD   albuterol sulfate HFA (PROAIR HFA) 108 (90 Base) MCG/ACT inhaler Inhale 2 puffs into the lungs 4 times daily as needed for Wheezing 18   Maria Dolores Modi MD   glucose blood VI test strips (ONE TOUCH ULTRA TEST) strip Tests once daily 14   Maria Dolores Modi MD   Blood Glucose Monitoring Suppl (ONE TOUCH ULTRA) by Does not apply route. Historical Provider, MD   famotidine (PEPCID) 20 MG tablet Take 20 mg by mouth as needed     Historical Provider, MD       Current medications:    No current facility-administered medications for this visit. No current outpatient medications on file.      Facility-Administered Medications Ordered in Other Visits Medication Dose Route Frequency Provider Last Rate Last Admin    amLODIPine (NORVASC) tablet 5 mg  5 mg Oral Daily Eddie Gaxiola MD   5 mg at 06/24/22 2138    aspirin EC tablet 81 mg  81 mg Oral Daily Eddie Gaxiola MD        insulin lispro (HUMALOG) injection vial 0-12 Units  0-12 Units SubCUTAneous TID WC Eddie Gaxiola MD        insulin lispro (HUMALOG) injection vial 0-6 Units  0-6 Units SubCUTAneous Nightly Eddie Gaxiola MD   1 Units at 06/24/22 2143    tamsulosin (FLOMAX) capsule 0.4 mg  0.4 mg Oral Daily Eddie Gaxiola MD   0.4 mg at 06/24/22 2138    morphine injection 4 mg  4 mg IntraVENous Q4H PRN Eddie Gaxiola MD   4 mg at 06/24/22 2117    lactated ringers infusion   IntraVENous Continuous Eddie Gaxiola  mL/hr at 06/25/22 0630 New Bag at 06/25/22 0630    sodium chloride flush 0.9 % injection 5-40 mL  5-40 mL IntraVENous 2 times per day Eddie Gaxiola MD   10 mL at 06/24/22 2116    sodium chloride flush 0.9 % injection 5-40 mL  5-40 mL IntraVENous PRN Eddie Gaxiola MD        0.9 % sodium chloride infusion   IntraVENous PRN Eddie Gaxiola MD        enoxaparin (LOVENOX) injection 40 mg  40 mg SubCUTAneous Daily Eddie Gaxiola MD        ondansetron (ZOFRAN-ODT) disintegrating tablet 4 mg  4 mg Oral Q8H PRN Abelino Silva MD        Or    ondansetron [facility]) injection 4 mg  4 mg IntraVENous Q6H PRN Eddie Gaxiola MD   4 mg at 06/24/22 2117    polyethylene glycol (GLYCOLAX) packet 17 g  17 g Oral Daily PRN Eddie Gaxiola MD        acetaminophen (TYLENOL) tablet 650 mg  650 mg Oral Q6H PRN Eddie Gaxiola MD        Or   [facility] acetaminophen (TYLENOL) suppository 650 mg  650 mg Rectal Q6H PRN Eddie Gaxiola MD        albuterol sulfate HFA (PROVENTIL;VENTOLIN;PROAIR) 108 (90 Base) MCG/ACT inhaler 2 puff  2 puff Inhalation Q4H PRN Eddie Gaxiola MD           Allergies:     Allergies   Allergen Reactions    Ace Inhibitors      COUGH      Pollen Extract        Problem List:    Patient Active Problem List   Diagnosis Code    Essential hypertension I10    Obesity E66.9    Sleep apnea G47.30    Type 2 diabetes mellitus without complication, without long-term current use of insulin (HCC) E11.9    Mild intermittent asthma without complication N10.45    Carpal tunnel syndrome of right wrist G56.01    Nephrolithiasis N20.0    ONOFRE (acute kidney injury) ([facility] [address] N17.9       Past Medical History:        Diagnosis Date    Diabetes mellitus ([facility]     Hypertension     Obesity, unspecified     MARIA A on CPAP     Other abnormal blood chemistry     Seasonal allergies     Unspecified sleep apnea        Past Surgical History:        Procedure Laterality Date    CATARACT REMOVAL WITH IMPLANT  2009, 2010    B eyes    CYSTOSCOPY Left 8/24/2021    CYSTOSCOPY, LEFT RETROGRADE PYELOGRAM, PLACEMENT OF LEFT URETERAL STENT performed by Pritesh De Leon MD at [facility] Left 9/2/2021    CYSTOSCOPY WITH LEFT URETEROSCOPY WITH HOLMIUM LASER LITHOTRIPSY, STONE MANIPULATION AND LEFT STENT REMOVAL performed by Pritesh De Leon MD at [facility] 4/9/2022    CYSTOSCOPY LEFT URETERAL STENT INSERTION performed by Pritesh De Leon MD at [facility]  08/2018       Social History:    Social History     Tobacco Use    Smoking status: Never Smoker    Smokeless tobacco: Never Used   Substance Use Topics    Alcohol use: Yes     Comment: socially                                Counseling given: Not Answered      Vital Signs (Current): There were no vitals filed for this visit.                                            BP Readings from Last 3 Encounters:   06/25/22 132/73   04/10/22 128/69   04/09/22 (!) 97/52       NPO Status:                                                                                 BMI:   Wt Readings from Last 3 Encounters:   06/24/22 241 lb 4 oz (109.4 kg)   04/09/22 250 lb (113.4 kg)   11/15/21 253 lb (114.8 kg)     There is no height or weight on file to calculate BMI.    CBC:   Lab Results   Component Value Date    WBC 11.9 06/24/2022    RBC 5.15 06/24/2022    HGB 16.9 06/24/2022    HCT 49.6 06/24/2022    MCV 96.4 06/24/2022    RDW 13.9 06/24/2022     06/24/2022       CMP:   Lab Results   Component Value Date     06/24/2022    K 4.8 06/24/2022    K 4.2 04/10/2022     06/24/2022    CO2 25 06/24/2022    BUN 27 06/24/2022    CREATININE 1.6 06/24/2022    GFRAA 55 06/24/2022    GFRAA >60 03/01/2012    AGRATIO 1.4 06/24/2022    LABGLOM 45 06/24/2022    GLUCOSE 183 06/24/2022    PROT 7.8 06/24/2022    PROT 7.6 03/01/2012    CALCIUM 10.1 06/24/2022    BILITOT 0.7 06/24/2022    ALKPHOS 63 06/24/2022    AST 35 06/24/2022    ALT 45 06/24/2022       POC Tests:   Recent Labs     06/25/22  0754   POCGLU 110*       Coags: No results found for: PROTIME, INR, APTT    HCG (If Applicable): No results found for: PREGTESTUR, PREGSERUM, HCG, HCGQUANT     ABGs: No results found for: PHART, PO2ART, ETM3HFH, ZFF6CFE, BEART, I7VTTXYJ     Type & Screen (If Applicable):  No results found for: LABABO, LABRH    Drug/Infectious Status (If Applicable):  No results found for: HIV, HEPCAB    COVID-19 Screening (If Applicable):   Lab Results   Component Value Date    COVID19 NOT DETECTED 06/24/2022           Anesthesia Evaluation  Patient summary reviewed and Nursing notes reviewed no history of anesthetic complications:   Airway: Mallampati: III  TM distance: >3 FB   Neck ROM: full  Mouth opening: > = 3 FB   Dental: normal exam         Pulmonary:normal exam  breath sounds clear to auscultation  (+) sleep apnea: on noncompliant,  asthma (mild intermittent per report):     (-) not a current smoker                           Cardiovascular:    (+) hypertension:,     (-) past MI, CAD, CABG/stent, dysrhythmias,  angina and  CHF    ECG reviewed  Rhythm: regular  Rate: normal                    Neuro/Psych:   Negative Neuro/Psych ROS     (-) seizures, TIA and CVA           GI/Hepatic/Renal:   (+) GERD: well controlled, renal disease: ARF and kidney stones,      (-) liver disease       Endo/Other:    (+) Diabetes (a1c 5.9)Type II DM, , .    (-) hypothyroidism, hyperthyroidism               Abdominal:   (+) obese,           Vascular: Other Findings:             Anesthesia Plan      general     ASA 3       Induction: intravenous. MIPS: Postoperative opioids intended and Prophylactic antiemetics administered. Anesthetic plan and risks discussed with patient.                         Kristen Watkins MD   6/25/2022

## 2022-06-25 NOTE — RT PROTOCOL NOTE
using Per Protocol order mode. 4-6 - enter or revise RT Bronchodilator order(s) to two equivalent RT bronchodilator orders with one order with BID Frequency and one order with Frequency of every 4 hours PRN wheezing or increased work of breathing using Per Protocol order mode. 7-10 - enter or revise RT Bronchodilator order(s) to two equivalent RT bronchodilator orders with one order with TID Frequency and one order with Frequency of every 4 hours PRN wheezing or increased work of breathing using Per Protocol order mode. 11-13 - enter or revise RT Bronchodilator order(s) to one equivalent RT bronchodilator order with QID Frequency and an Albuterol order with Frequency of every 4 hours PRN wheezing or increased work of breathing using Per Protocol order mode. Greater than 13 - enter or revise RT Bronchodilator order(s) to one equivalent RT bronchodilator order with every 4 hours Frequency and an Albuterol order with Frequency of every 2 hours PRN wheezing or increased work of breathing using Per Protocol order mode. RT to enter RT Home Evaluation for COPD & MDI Assessment order using Per Protocol order mode.     Electronically signed by Tiarra Mireles RCP on 6/24/2022 at 10:07 PM

## 2022-06-25 NOTE — ANESTHESIA POSTPROCEDURE EVALUATION
Department of Anesthesiology  Postprocedure Note    Patient: Gus Camp  MRN: [id number]  YOB: 1965  Date of evaluation: 6/25/2022      Procedure Summary     Date: 06/25/22 Room / Location: [address]    Anesthesia Start: 1104 Anesthesia Stop: 1149    Procedure: CYSTOSCOPY  LEFT URETERAL STENT INSERTION (Left ) Diagnosis:       Calculus, ureteral      (left ureteral calculus)    Surgeons: Howard Rai MD Responsible Provider: Allison Nielsen MD    Anesthesia Type: general ASA Status: 3          Anesthesia Type: No value filed.     Anibal Phase I: Anibal Score: 8    Anibal Phase II:        Anesthesia Post Evaluation    Patient location during evaluation: PACU  Patient participation: complete - patient participated  Level of consciousness: awake and alert  Airway patency: patent  Nausea & Vomiting: no nausea and no vomiting  Complications: no  Cardiovascular status: blood pressure returned to baseline  Respiratory status: acceptable  Hydration status: euvolemic  Multimodal analgesia pain management approach

## 2022-06-25 NOTE — ED NOTES
Report called to 4T, RN v/u and denies further questions at this time.       Percy Dumont RN  06/24/22 2025

## 2022-06-25 NOTE — PROGRESS NOTES
Shift assessment and AM vitals complete, VSS. AM meds held this AM, possible stent placement. Pt doing well, no pain this AM. The care plan and education has been reviewed and mutually agreed upon with the patient.

## 2022-06-25 NOTE — PROGRESS NOTES
Patient awake, alert, VSS, states pain is tolerable, phase I discharge criteria met, will transfer back to 4T.

## 2022-06-25 NOTE — CONSULTS
[facility] Consult Note  [facility]    Provider: Inés Vale MD MD Patient ID:  Admission Date: 2022 Name: Alfred Stokes Date: n/a MRN: [id number]   Patient Location: [id number] : 1965  Attending: Katie Reveles MD Date of Service: 2022  PCP: Nakia Bliss MD     Diagnoses:  1. Hydronephrosis with ureteral calculus    2. ONOFRE (acute kidney injury) ([address]    3. Kidney stone    4. Adenoma of left adrenal gland        Assessment/Plan:  55 yo M with an 8 mm proximal left ureteral stone, additional small right renal stones admitted with N/V and left flank pain. At least 2 prior stones in last year and has had prior surgery. Has seen Dr. Vertell Gottron and Dr. Greta Haji for MWU in the past and is on medical ppx. WBC 12 and ONOFRE with CR 1.6, no signs of infection with UA and AFVSS    - to OR for cystoscopy LEFT stent  - likely ESWL to treat in upcoming week or so- will place order- hx UA/Caox stones so likely will need intraop RGP to show stone  - knows all RBA of procedure and would like to proceed  - if renal function stable/improved and feeling better likely ok for dc home today    All the patients questions were answered in detail. He understands the plan as listed above.     · Review/order of labs  · Review/order of radiology studies  · Independent review and interpretation of test or study   Total time spent face-to-face with the patient 20 min, including greater than 50% of this time in discussion with the patient/family concerning the following:  · Recommended tests  · management options  · risks/benefits of management options  · importance of compliance  · Prognosis  · Risk factor reduction  · Patient/family education                                                                                                                                                      CC:   Chief Complaint   Patient presents with    Abdominal Pain     abdominal pain, chills, n/v. denies diarrhea. headache. started today. HPI: Delmi Freeman is a 56 y.o. male. I saw the patient today for flank pain and obstructing proximal ureteral stone     Past medical History:   He has a past medical history of Diabetes mellitus ([address] Hypertension, Obesity, unspecified, MARIA A on CPAP, Other abnormal blood chemistry, Seasonal allergies, and Unspecified sleep apnea. Past Surgical History:  He has a past surgical history that includes Cataract removal with implant (2009, 2010); Tooth Extraction (08/2018); Cystoscopy (Left, 8/24/2021); Cystoscopy (Left, 9/2/2021); and Cystoscopy (Left, 4/9/2022). Allergies: Allergies   Allergen Reactions    Ace Inhibitors      COUGH      Pollen Extract        Social History:  He reports that he has never smoked. He has never used smokeless tobacco. He reports current alcohol use. He reports that he does not use drugs. Family History:  family history includes Arthritis in his mother; High Blood Pressure in his father; Kidney stones in his mother; Other in his father and mother.     Medications:   Scheduled Meds:   amLODIPine  5 mg Oral Daily    aspirin  81 mg Oral Daily    insulin lispro  0-12 Units SubCUTAneous TID WC    insulin lispro  0-6 Units SubCUTAneous Nightly    tamsulosin  0.4 mg Oral Daily    sodium chloride flush  5-40 mL IntraVENous 2 times per day    enoxaparin  40 mg SubCUTAneous Daily     Continuous Infusions:   lactated ringers      lactated ringers 100 mL/hr at 06/25/22 0630    sodium chloride       PRN Meds:morphine, sodium chloride flush, sodium chloride, ondansetron **OR** ondansetron, polyethylene glycol, acetaminophen **OR** acetaminophen, albuterol sulfate HFA    Review of Systems:  Constitutional: Negative for fever    Genitourinary: see HPI  Eyes: negative for sudden change in vision  EENT: no complaints  Cardiovascular: Negative for chest pain  Respiratory: Negative for shortness of breath  Gastrointestinal: nausea  Musculoskeletal:  back pain   Neurological: Negative for weakness  Psychiatric: Negative for anxiety  Integumentary: Negative for rashes or adenopathy     Physical Exam:  Vitals:    06/25/22 0630   BP: 121/70   Pulse: 94   Resp: 16   Temp: 97.8 °F (36.6 °C)   SpO2: 97%     Constitutional: NAD, well-developed, well-nourished. HEENT: MMM. Hearing intact. PERRL  Neck: no thyroid masses appreciated. Trachea is midline. Neck appears unremarkable   Lymph: no palpable adenopathy in supraclavicular, or axillary lymph nodes  Cardiovascular: Regular rate. No peripheral edema  Respiratory: Respirations are even and non-labored. No audible breath sounds. Genitourinarydeferred   Abdomen: Soft. No distension, tenderness, hernias, masses or guarding. No CVA tenderness. No hernias appreciated. Liver and spleen appear normal  Psychiatric: A + O x 3, normal affect. Insight appears intact. Muskuloskeletal: WILLIS x 4   Skin: Pink, warm and dry. No rashes on face and arms. Labs:  Lab Results   Component Value Date    WBC 11.9 (H) 06/24/2022    HGB 16.9 06/24/2022    HCT 49.6 06/24/2022    MCV 96.4 06/24/2022     06/24/2022     Lab Results   Component Value Date    CREATININE 1.6 (H) 06/24/2022    BUN 27 (H) 06/24/2022     06/24/2022    K 4.8 06/24/2022     06/24/2022    CO2 25 06/24/2022     Lab Results   Component Value Date    PSA 0.76 08/25/2021        Imaging:   EXAMINATION:   CT OF THE ABDOMEN AND PELVIS WITHOUT CONTRAST 6/24/2022 4:32 pm     TECHNIQUE:   CT of the abdomen and pelvis was performed without the administration of   intravenous contrast. Multiplanar reformatted images are provided for review. Automated exposure control, iterative reconstruction, and/or weight based   adjustment of the mA/kV was utilized to reduce the radiation dose to as low   as reasonably achievable.      COMPARISON:   04/09/2022     HISTORY:   ORDERING SYSTEM PROVIDED HISTORY: abd pain   TECHNOLOGIST PROVIDED HISTORY:   Reason for exam:->abd pain   Additional Contrast?->None   Decision Support Exception - unselect if not a suspected or confirmed   emergency medical condition->Emergency Medical Condition (MA)     FINDINGS:   Lower Chest:   The lung bases are clear. Organs:   The liver is mildly enlarged with hypertrophy of the caudate and   left lobes and fatty replacement throughout which is unchanged.  The   gallbladder, pancreas, and bile ducts are normal.  The spleen is   unremarkable.  The adrenals are normal.  There is a 1 cm left adrenal nodule   which is unchanged.  The right adrenal is normal.  The left kidney is   slightly enlarged and there is moderate stranding around the kidney which is   more prominent.  There is mild to moderate hydronephrosis. Juris Kellie is a 3 cm   cystic mass along the upper pole with calcific debris layering dependently in   the cyst which is more prominent. Juris Kellie is a 3 cm cyst along the lower pole   which is unchanged.  There are small stones scattered along the lower pole   measuring up to 5 mm which are unchanged     There is an 8 mm stone lodged in the proximal left ureter, just distal to the   lower pole kidney which is unchanged in position and size from the prior   study.  There are tiny 1 mm stones lower pole right kidney which are more   apparent with no hydronephrosis.  The right ureter is normal caliber.  The   distal ureters are normal caliber. GI/Bowel:   The appendix is normal.  The bowel gas pattern is unremarkable. The mesentery is unremarkable. Pelvis:   The bladder is unremarkable. Juris Kellie is no pelvic mass or   adenopathy.  The mesentery is unremarkable. Peritoneum/Retroperitoneum: There is mild calcified plaque throughout the   aorta which is normal caliber with no aneurysm and no retroperitoneal mass or   adenopathy is seen. Bones/Soft Tissues:   There are moderate degenerative changes throughout the   spine with no aggressive osseous lesion. Impression:     8 mm stone lodged in the proximal left ureter which is unchanged in position   and is causing persistent moderate obstruction which is slightly more   prominent with increasing stranding around the kidney.  Recommend urological   follow-up     Multiple renal stones scattered in the left kidney and a 3 cm complex cyst   upper pole on the left with calcific debris in the cyst which is unchanged. 3 cm cyst lower pole left kidney which is unchanged. Tiny renal stones on the right which are better visualized with no   hydronephrosis or urinary obstruction on the right     Mild chronic liver changes with fatty replacement throughout. Normal appendix with no pelvic mass or active inflammation.           Vicki Kent MD, MD  6/25/2022

## 2022-06-25 NOTE — PROGRESS NOTES
Admission completed, pt is alert and oriented, VSS, independent in room, call light within reach, Morphine for pain, pt is aware of NPO status, see flowsheets and MAR, will monitor pt. The care plan and education has been reviewed and mutually agreed upon with the patient.

## 2022-06-25 NOTE — PROGRESS NOTES
[facility] PROGRESS NOTE    6/25/2022 3:06 PM        Name: Ryan Yousif . Admitted: 6/24/2022  Primary Care Provider: Jasmyn Bryson MD (Tel: [phone])          Subjective:    Lying in bed pain under control no nausea vomiting or chest pain    Reviewed interval ancillary notes    Current Medications  phenazopyridine (PYRIDIUM) tablet 200 mg, TID WC  oxybutynin (DITROPAN) tablet 5 mg, TID  amLODIPine (NORVASC) tablet 5 mg, Daily  aspirin EC tablet 81 mg, Daily  insulin lispro (HUMALOG) injection vial 0-12 Units, TID WC  insulin lispro (HUMALOG) injection vial 0-6 Units, Nightly  tamsulosin (FLOMAX) capsule 0.4 mg, Daily  morphine injection 4 mg, Q4H PRN  lactated ringers infusion, Continuous  sodium chloride flush 0.9 % injection 5-40 mL, 2 times per day  sodium chloride flush 0.9 % injection 5-40 mL, PRN  0.9 % sodium chloride infusion, PRN  enoxaparin (LOVENOX) injection 40 mg, Daily  ondansetron (ZOFRAN-ODT) disintegrating tablet 4 mg, Q8H PRN   Or  ondansetron (ZOFRAN) injection 4 mg, Q6H PRN  polyethylene glycol (GLYCOLAX) packet 17 g, Daily PRN  acetaminophen (TYLENOL) tablet 650 mg, Q6H PRN   Or  acetaminophen (TYLENOL) suppository 650 mg, Q6H PRN  albuterol sulfate HFA (PROVENTIL;VENTOLIN;PROAIR) 108 (90 Base) MCG/ACT inhaler 2 puff, Q4H PRN        Objective:  BP (!) 145/82   Pulse 95   Temp 97.7 °F (36.5 °C) (Oral)   Resp 16   Ht 5' 8.5\" (1.74 m)   Wt 241 lb 4 oz (109.4 kg)   SpO2 94%   BMI 36.15 kg/m²     Intake/Output Summary (Last 24 hours) at 6/25/2022 1506  Last data filed at 6/25/2022 1236  Gross per 24 hour   Intake 400 ml   Output 25 ml   Net 375 ml      Wt Readings from Last 3 Encounters:   06/24/22 241 lb 4 oz (109.4 kg)   04/09/22 250 lb (113.4 kg)   11/15/21 253 lb (114.8 kg)       General appearance:  Appears comfortable.  AAOx3  HEENT: atraumatic, Pupils equal, muscous membranes moist, no masses appreciated  Cardiovascular: Regular rate and rhythm no murmurs appreciated  Respiratory: CTAB no wheezing  Gastrointestinal: Abdomen soft, non-tender, BS+  EXT: no edema  Neurology: no gross focal deficts  Psychiatry: Appropriate affect. Not agitated  Skin: Warm, dry, no rashes appreciated    Labs and Tests:  CBC:   Recent Labs     06/24/22  1540 06/25/22  1340   WBC 11.9* 11.6*   HGB 16.9 15.7    200     BMP:    Recent Labs     06/24/22  1540 06/25/22  1340    138   K 4.8 4.6    102   CO2 25 27   BUN 27* 31*   CREATININE 1.6* 2.2*   GLUCOSE 183* 146*     Hepatic:   Recent Labs     06/24/22  1540   AST 35   ALT 45*   BILITOT 0.7   ALKPHOS 63     FL RETROGRADE PYELOGRAM LEFT   Final Result   Intraprocedural fluoroscopic spot images as above. See separate procedure   report for more information. CT ABDOMEN PELVIS WO CONTRAST Additional Contrast? None   Final Result   8 mm stone lodged in the proximal left ureter which is unchanged in position   and is causing persistent moderate obstruction which is slightly more   prominent with increasing stranding around the kidney. Recommend urological   follow-up      Multiple renal stones scattered in the left kidney and a 3 cm complex cyst   upper pole on the left with calcific debris in the cyst which is unchanged. 3 cm cyst lower pole left kidney which is unchanged. Tiny renal stones on the right which are better visualized with no   hydronephrosis or urinary obstruction on the right      Mild chronic liver changes with fatty replacement throughout. Normal appendix with no pelvic mass or active inflammation. 1 cm left adrenal adenoma which is unchanged. Recent imaging reviewed    Problem List  Principal Problem:    ONOFRE (acute kidney injury) ([address]  Resolved Problems:    * No resolved hospital problems.  *       Assessment/Plan:   ONOFRE secondary to left nephrolithiasis  - urology to take to or  - ivf  - flomax  -Iv morphine prn  - cr increasing hopefully better after stone removal repeat labs in am     Dm2: SSI     Htn: home meds        DVT prophylaxis lovenox  Code status full code         Christiana Easton MD   6/25/2022 3:06 PM

## 2022-06-25 NOTE — PROGRESS NOTES
06/24/22 2207   RT Protocol   History Pulmonary Disease 0   Respiratory pattern 0   Breath sounds 2   Cough 0   Bronchodilator Assessment Score 2

## 2022-06-25 NOTE — OP NOTE
Urology Operative Report  [facility]    Provider: Anna Greenfield MD MD Patient ID:  Admission Date: 2022 Name: Wolfgang Aguila Date: 2022  MRN: [id number]   Patient Location: OR/NONE : 1965  Attending: Flavia Sprague MD Date of Service: 2022  PCP: Kori Helm MD     Date of Operation: 2022    Preoperative Diagnosis: LEFT side proximal ureteral stone    Postoperative Diagnosis: same    Procedure:    1. Cystoscopy  2. Left side ureteral stent placement  3. Fluoroscopic imaging < 1 hr physician time  4. LEFT side retrograde ureteropyelogram    Surgeon:   Anna Greenfield MD, MD    Anesthesia: General LMA anesthesia    Indications: Andres Emerson is a 56 y.o. male who presents for the above named surgery. Informed consent was obtained and the risks, benefits, and details of the procedure were explained to the patient who elected to proceed. Details of Procedure: The patient was brought to the operating room and placed in the supine position on the operating room table. SCDs were placed on the lower extremities. Following induction of anesthesia the patient was positioned in a lithotomy position, all pressure points were padded, and the genitals were prepped and draped in the usual sterile fashion. A routine timeout was performed, confirming the patient, procedure, site, risk of fire, patient allergies and confirming that preoperative antibiotics had been administered prior to beginning. A 21 fr rigid cystoscope was advance via a normal appearing urethra into the bladder. The bladder was inspected and there were no suspicious lesions, stones or diverticula seen. Attention was turned to the left ureteral orifice. A 0.035 sensor wire was advance into the left UO and up to the renal pelvis under control of fluoroscopy. There was slight resistance felt at the expected level of the stone based on the CT scan.  Over the wire a 5 fr pollock catheter was positioned and a RUPG was done. The wire was replaced proximal to the stone, and the Nakia Skill was removed. Over the wire a stent was advanced under control of fluoroscopy with good curl in the renal pelvis and the urinary bladder. The bladder was emptied and the scope removed    At the end of the procedure all counts were correct. The patient tolerated the procedure well and was transported to the PACU in stable condition. Findings: Crystals in bladder. Left proximal to mid 8 mm ureteral stone, filling defect seen on RGP. Suggestive of uric acid stone, HU on CT ~600 likely UA mixed stone    Estimated Blood Loss: minimal                  Drains: 6fr x 26cm left ureteral stent          Specimens: none    Complications: none apparent           Disposition:  PACU - hemodynamically stable. Plan: Stable for dc from Gu standpoint.  ESWL with possible RGP ordered and will coordinate as Will MD Kim  6/25/2022

## 2022-06-26 VITALS
WEIGHT: 241.25 LBS | HEIGHT: 69 IN | RESPIRATION RATE: 16 BRPM | OXYGEN SATURATION: 94 % | HEART RATE: 87 BPM | BODY MASS INDEX: 35.73 KG/M2 | DIASTOLIC BLOOD PRESSURE: 77 MMHG | TEMPERATURE: 97.8 F | SYSTOLIC BLOOD PRESSURE: 125 MMHG

## 2022-06-26 LAB
ANION GAP SERPL CALCULATED.3IONS-SCNC: 10 MMOL/L (ref 3–16)
BASOPHILS ABSOLUTE: 0 K/UL (ref 0–0.2)
BASOPHILS RELATIVE PERCENT: 0.1 %
BUN BLDV-MCNC: 41 MG/DL (ref 7–20)
CALCIUM SERPL-MCNC: 8.9 MG/DL (ref 8.3–10.6)
CHLORIDE BLD-SCNC: 105 MMOL/L (ref 99–110)
CO2: 23 MMOL/L (ref 21–32)
CREAT SERPL-MCNC: 1.6 MG/DL (ref 0.9–1.3)
EOSINOPHILS ABSOLUTE: 0 K/UL (ref 0–0.6)
EOSINOPHILS RELATIVE PERCENT: 0 %
GFR AFRICAN AMERICAN: 55
GFR NON-AFRICAN AMERICAN: 45
GLUCOSE BLD-MCNC: 154 MG/DL (ref 70–99)
GLUCOSE BLD-MCNC: 157 MG/DL (ref 70–99)
GLUCOSE BLD-MCNC: 172 MG/DL (ref 70–99)
HCT VFR BLD CALC: 42.4 % (ref 40.5–52.5)
HEMOGLOBIN: 14.4 G/DL (ref 13.5–17.5)
LYMPHOCYTES ABSOLUTE: 1.6 K/UL (ref 1–5.1)
LYMPHOCYTES RELATIVE PERCENT: 14.5 %
MCH RBC QN AUTO: 33.6 PG (ref 26–34)
MCHC RBC AUTO-ENTMCNC: 34.1 G/DL (ref 31–36)
MCV RBC AUTO: 98.5 FL (ref 80–100)
MONOCYTES ABSOLUTE: 0.3 K/UL (ref 0–1.3)
MONOCYTES RELATIVE PERCENT: 2.7 %
NEUTROPHILS ABSOLUTE: 9.1 K/UL (ref 1.7–7.7)
NEUTROPHILS RELATIVE PERCENT: 82.7 %
PDW BLD-RTO: 14.2 % (ref 12.4–15.4)
PERFORMED ON: ABNORMAL
PERFORMED ON: ABNORMAL
PLATELET # BLD: 197 K/UL (ref 135–450)
PMV BLD AUTO: 8.7 FL (ref 5–10.5)
POTASSIUM REFLEX MAGNESIUM: 4.4 MMOL/L (ref 3.5–5.1)
RBC # BLD: 4.3 M/UL (ref 4.2–5.9)
SODIUM BLD-SCNC: 138 MMOL/L (ref 136–145)
WBC # BLD: 11.1 K/UL (ref 4–11)

## 2022-06-26 PROCEDURE — 85025 COMPLETE CBC W/AUTO DIFF WBC: CPT

## 2022-06-26 PROCEDURE — 6370000000 HC RX 637 (ALT 250 FOR IP): Performed by: UROLOGY

## 2022-06-26 PROCEDURE — 6360000002 HC RX W HCPCS: Performed by: UROLOGY

## 2022-06-26 PROCEDURE — 80048 BASIC METABOLIC PNL TOTAL CA: CPT

## 2022-06-26 RX ORDER — TAMSULOSIN HYDROCHLORIDE 0.4 MG/1
0.4 CAPSULE ORAL DAILY
Qty: 30 CAPSULE | Refills: 3 | Status: SHIPPED | OUTPATIENT
Start: 2022-06-27

## 2022-06-26 RX ORDER — PHENAZOPYRIDINE HYDROCHLORIDE 200 MG/1
200 TABLET, FILM COATED ORAL
Qty: 30 TABLET | Refills: 0 | Status: SHIPPED | OUTPATIENT
Start: 2022-06-26 | End: 2022-06-29

## 2022-06-26 RX ORDER — HYDROCODONE BITARTRATE AND ACETAMINOPHEN 5; 325 MG/1; MG/1
1 TABLET ORAL EVERY 6 HOURS PRN
Qty: 12 TABLET | Refills: 0 | Status: SHIPPED | OUTPATIENT
Start: 2022-06-26 | End: 2022-06-29

## 2022-06-26 RX ORDER — OXYBUTYNIN CHLORIDE 5 MG/1
5 TABLET ORAL 3 TIMES DAILY
Qty: 90 TABLET | Refills: 3 | Status: SHIPPED | OUTPATIENT
Start: 2022-06-26

## 2022-06-26 RX ADMIN — ASPIRIN 81 MG: 81 TABLET, COATED ORAL at 08:41

## 2022-06-26 RX ADMIN — AMLODIPINE BESYLATE 5 MG: 5 TABLET ORAL at 08:41

## 2022-06-26 RX ADMIN — INSULIN LISPRO 2 UNITS: 100 INJECTION, SOLUTION INTRAVENOUS; SUBCUTANEOUS at 08:41

## 2022-06-26 RX ADMIN — OXYBUTYNIN CHLORIDE 5 MG: 5 TABLET ORAL at 08:41

## 2022-06-26 RX ADMIN — PHENAZOPYRIDINE HYDROCHLORIDE 200 MG: 100 TABLET ORAL at 12:33

## 2022-06-26 RX ADMIN — TAMSULOSIN HYDROCHLORIDE 0.4 MG: 0.4 CAPSULE ORAL at 08:41

## 2022-06-26 RX ADMIN — PHENAZOPYRIDINE HYDROCHLORIDE 200 MG: 100 TABLET ORAL at 08:41

## 2022-06-26 RX ADMIN — ENOXAPARIN SODIUM 40 MG: 100 INJECTION SUBCUTANEOUS at 08:41

## 2022-06-26 ASSESSMENT — PAIN DESCRIPTION - LOCATION: LOCATION: ABDOMEN

## 2022-06-26 ASSESSMENT — PAIN SCALES - GENERAL: PAINLEVEL_OUTOF10: 1

## 2022-06-26 NOTE — DISCHARGE SUMMARY
[facility] DISCHARGE SUMMARY    Patient Demographics    Patient. Yunior Velazquez  Date of Birth. 1965  MRN. [id number]     Primary care provider. Cedric Brody MD  (Tel: [phone])    Admit date: 6/24/2022    Discharge date 6/26/2022  Note Date: 6/26/2022     Reason for Hospitalization. Chief Complaint   Patient presents with    Abdominal Pain     abdominal pain, chills, n/v. denies diarrhea. headache. started today. Significant Findings. Principal Problem:    ONOFRE (acute kidney injury) ([address]  Resolved Problems:    * No resolved hospital problems. *       Problems and results from this hospitalization that need follow up. Stable for dc from Gu standpoint. ESWL with possible RGP ordered and will coordinate as outpt     Creat 1.6 on day of d/c . Needs repeat labs this week , follows with [facility]     Significant test results and incidental findings. mm stone lodged in the proximal left ureter which is unchanged in position   and is causing persistent moderate obstruction which is slightly more   prominent with increasing stranding around the kidney.  Recommend urological   follow-up     Multiple renal stones scattered in the left kidney and a 3 cm complex cyst   upper pole on the left with calcific debris in the cyst which is unchanged. 3 cm cyst lower pole left kidney which is unchanged. Tiny renal stones on the right which are better visualized with no   hydronephrosis or urinary obstruction on the right     Mild chronic liver changes with fatty replacement throughout. Normal appendix with no pelvic mass or active inflammation. 1 cm left adrenal adenoma which is unchanged.            Invasive procedures and treatments. 6/25/22:    1. Cystoscopy  2. Left side ureteral stent placement  3. Fluoroscopic imaging < 1 hr physician time  4.  LEFT side retrograde ureteropyelogram  [facility] Course. The patient was admitted through the Ed with intractable pain that required IV narcotics. He was noted to have an 8 mm obstructing stone. He was admitted and followed by urology. He was taken to the OR on 6/25/22 for cysto with stent placement. On the following day he was feeling much improved and was d/c. He will follow up this week to schedule possible ESWL with possible RPG. He was afebrile during his stay     ONOFRE :  Creat peaked at 2.2 and was 1.6 on day of d/c . He was tolerating a diet and voiding qs. PO fluids were encouraged. He can get repeat labs in 3 days ( this was ordered) He can also follow up with nephrology if needed     T2DM:  AIC 6 % , not currently on any therapy. ( wears Dex Com sensor)     HTN:  BP in range on home therapy     Consults. IP CONSULT TO UROLOGY  IP CONSULT TO HOSPITALIST    Physical examination on discharge day. BP (!) 114/43   Pulse 94   Temp 98 °F (36.7 °C) (Oral)   Resp 16   Ht 5' 8.5\" (1.74 m)   Wt 241 lb 4 oz (109.4 kg)   SpO2 96%   BMI 36.15 kg/m²   General appearance. Alert. Looks comfortable. HEENT. Sclera clear. Moist mucus membranes. Cardiovascular. Regular rate and rhythm, normal S1, S2. No murmur. Respiratory. Not using accessory muscles. Clear to auscultation bilaterally, no wheeze. Gastrointestinal. Abdomen soft, non-tender, not distended, normal bowel sounds  Neurology. Facial symmetry. No speech deficits. Moving all extremities equally. Extremities. No edema in lower extremities. Skin. Warm, dry, normal turgor    Condition at time of discharge stable    Medication instructions provided to patient at discharge. Medication List      START taking these medications    HYDROcodone-acetaminophen 5-325 MG per tablet  Commonly known as: Norco  Take 1 tablet by mouth every 6 hours as needed for Pain for up to 3 days. Intended supply: 3 days.  Take lowest dose possible to manage pain oxybutynin 5 MG tablet  Commonly known as: DITROPAN  Take 1 tablet by mouth 3 times daily     phenazopyridine 200 MG tablet  Commonly known as: PYRIDIUM  Take 1 tablet by mouth 3 times daily (with meals) for 3 days     tamsulosin 0.4 MG capsule  Commonly known as: FLOMAX  Take 1 capsule by mouth daily  Start taking on: June 27, 2022        CHANGE how you take these medications    azelastine 0.1 % nasal spray  Commonly known as: ASTELIN  1 spray by Nasal route 2 times daily Use in each nostril as directed  What changed:   · when to take this  · reasons to take this     hyoscyamine 125 MCG tablet  Commonly known as: Levsin  Take 1 tablet by mouth every 4 hours as needed for Cramping  What changed: when to take this        CONTINUE taking these medications    albuterol sulfate  (90 Base) MCG/ACT inhaler  Commonly known as: ProAir HFA  Inhale 2 puffs into the lungs 4 times daily as needed for Wheezing     amLODIPine 5 MG tablet  Commonly known as: NORVASC  TAKE 1 TABLET BY MOUTH EVERY DAY     aspirin 81 MG EC tablet     blood glucose test strips strip  Commonly known as: ONE TOUCH ULTRA TEST  Tests once daily     famotidine 20 MG tablet  Commonly known as: PEPCID     ONE TOUCH ULTRA        STOP taking these medications    glimepiride 2 MG tablet  Commonly known as: AMARYL           Where to Get Your Medications      These medications were sent to [address], [facility], [address] [place] [address] Road    Phone: [phone]   · oxybutynin 5 MG tablet  · phenazopyridine 200 MG tablet  · tamsulosin 0.4 MG capsule     You can get these medications from any pharmacy    Bring a paper prescription for each of these medications  · HYDROcodone-acetaminophen 5-325 MG per tablet         Discharge recommendations given to patient. Follow Up. in 1 week   Disposition. Home   Activity. As tolerated   Diet: ADULT DIET;  Regular      Spent 35 minutes in discharge process.     Signed:  QUINCY Anne - ANDRA     6/26/2022 7:06 AM

## 2022-06-26 NOTE — PROGRESS NOTES
Urology Progress Note  [facility]    Provider: Sweta Vila MD MD Patient ID:  Admission Date: 2022 Name: Tabitha Acharya Date: 2022 MRN: [id number]   Patient Location: [id number] : 1965  Attending: Harriet Tolbert MD Date of Service: 2022  PCP: Jatin Amaral MD     Diagnoses:  1. Hydronephrosis with ureteral calculus    2. ONOFRE (acute kidney injury) ([address]    3. Kidney stone    4. Adenoma of left adrenal gland          Assessment/Plan:  57 yo M with an 8 mm proximal left ureteral stone, additional small right renal stones admitted with N/V and left flank pain. At least 2 prior stones in last year and has had prior surgery. Has seen Dr. Rafael Lewis and Dr. Damari Olivo for MWU in the past and is on medical ppx. WBC 12 and ONOFRE with CR 1.6, no signs of infection with UA and AFVSS  --   S/p left ureteral stent placement . Renal function better from 2.2 to 1.6 this AM     - plan on ESWL to treat in upcoming week or so-order placed- hx UA/Caox stones so likely will need intraop RGP to show stone  - renal function better  - stable for dc from  standpoint   . Subjective:   Torey Alfonso is a 56 y.o. male. He was seen and examined this morning.  Today we discussed outpt ESWL     Objective:   Vitals:  Vitals:    22 0830   BP: 125/77   Pulse: 87   Resp: 16   Temp: 97.8 °F (36.6 °C)   SpO2: 94%       Intake/Output Summary (Last 24 hours) at 2022 0951  Last data filed at 2022 0847  Gross per 24 hour   Intake 400 ml   Output 1375 ml   Net -975 ml     Physical Exam:  Gen: Alert and oriented x3, no acute distress      Labs:  Lab Results   Component Value Date    WBC 11.1 (H) 2022    HGB 14.4 2022    HCT 42.4 2022    MCV 98.5 2022     2022     Lab Results   Component Value Date    CREATININE 1.6 (H) 2022    BUN 41 (H) 2022     2022    K 4.4 2022     2022    CO2 23 2022 Vicki Kent MD MD  6/26/2022

## 2022-06-26 NOTE — PLAN OF CARE
Problem: Discharge Planning  Goal: Discharge to home or other facility with appropriate resources  Outcome: Progressing     Problem: Pain  Goal: Verbalizes/displays adequate comfort level or baseline comfort level  Outcome: Progressing     Problem: Chronic Conditions and Co-morbidities  Goal: Patient's chronic conditions and co-morbidity symptoms are monitored and maintained or improved  Outcome: Progressing     Problem: ABCDS Injury Assessment  Goal: Absence of physical injury  Outcome: Progressing

## 2022-06-26 NOTE — CARE COORDINATION
Chart reviewed and it appears that patient has minimal needs for discharge at this time. Please reach out to case management if discharge needs are identified. Case management will continue to follow progress and update discharge plan as needed.

## 2022-06-26 NOTE — PLAN OF CARE
Problem: Discharge Planning  Goal: Discharge to home or other facility with appropriate resources  Outcome: Completed     Problem: Pain  Goal: Verbalizes/displays adequate comfort level or baseline comfort level  Outcome: Completed     Problem: Chronic Conditions and Co-morbidities  Goal: Patient's chronic conditions and co-morbidity symptoms are monitored and maintained or improved  Outcome: Completed     Problem: ABCDS Injury Assessment  Goal: Absence of physical injury  Outcome: Completed

## 2022-06-27 ENCOUNTER — CARE COORDINATION (OUTPATIENT)
Dept: CASE MANAGEMENT | Age: 57
End: 2022-06-27

## 2022-06-27 DIAGNOSIS — I10 ESSENTIAL HYPERTENSION: Primary | ICD-10-CM

## 2022-06-27 NOTE — CARE COORDINATION
Advised obtaining a 90-day supply of all daily and as-needed medications. Was patient discharged with a pulse oximeter? no    LPN CC provided contact information. Plan for follow-up call in 5-7 days based on severity of symptoms and risk factors. Plan for next call: symptom management-BS     This [facility] spoke with pt and pt stated that he is doing well. Patient denied any worsening symptoms. Denied fever, chills, N/V and any difficulty breathing at this time. Denied chest pain and SOB, palpitations and edema. Denied difficulty with urination, BMs or appetite. Pt stated that his BS got up to 250 on yesterday. Pt denied s/s of hyperglycemia but monitors daily and will inform PCP. Pt is taking all meds as prescribed. Pt stated that his new meds were more expensive than he had anticipated but declined assistance and stated that he would uses his HSA. Pt has a f/u with Uro and has not scheduled with PCP. Writer will route message to [employer] to advise. Denied any needs and concerns at this time. Advised pt to immediately report any worsening symptoms to the PCP. Patient verbalized understanding and agreed.   Skip Walden LPN, [facility]  PH: [phone]          Care Transitions 24 Hour Call    Schedule Follow Up Appointment with PCP: Completed  Do you have a copy of your discharge instructions?: Yes  Do you have all of your prescriptions and are they filled?: Yes  Have you been contacted by a [facility] Pharmacist?: No  Have you scheduled your follow up appointment?: No  Do you have support at home?: Partner/Spouse/SO  Do you feel like you have everything you need to keep you well at home?: Yes  Are you an active caregiver in your home?: No  Care Transitions Interventions  No Identified Needs         Follow Up  Future Appointments   Date Time Provider Elizabeth Rosenbaum   8/22/2022 11:45 AM MD YAEL Joy LPN

## 2022-07-06 ENCOUNTER — CARE COORDINATION (OUTPATIENT)
Dept: CASE MANAGEMENT | Age: 57
End: 2022-07-06

## 2022-07-06 NOTE — CARE COORDINATION
Care Transitions Outreach Attempt    Call within 2 business days of discharge: Yes   Attempted to reach patient for transitions of care follow up. Unable to reach patient. Left HIPPA Compliant . Karl Roers  LPN, [facility]: [phone]    Patient: Natalie Parikh Patient : 1965 MRN: [id number]    Last Discharge [facility]       Complaint Diagnosis Description Type Department Provider    22 Abdominal Pain Hydronephrosis with ureteral calculus . .. ED to Hosp-Admission (Discharged) (ADMITTED) Анна Luong MD; Abelino Diaz. .. Was this an external facility discharge?  No Discharge Facility: [facility]    Noted following upcoming appointments from discharge chart review:   [facility] follow up appointment(s):   Future Appointments   Date Time Provider Elizabeth Rosenbaum   2022 11:45 AM Madhu Ravi MD [facility]     Non-BS follow up appointment(s):

## 2022-07-08 ENCOUNTER — CARE COORDINATION (OUTPATIENT)
Dept: CASE MANAGEMENT | Age: 57
End: 2022-07-08

## 2022-07-08 NOTE — CARE COORDINATION
Umm 45 Transitions Follow Up Call    2022    Patient: [place]   Patient : 1965   MRN: <[id number]>  Reason for Admission: hydronephrosis; L ureteral calculus, cysto with stent, ONOFRE  Discharge Date: 22 RARS: Readmission Risk Score: 13.7 ( )    Attempted to reach pt for follow up call. Left message requesting call back. Care Transitions Subsequent and Final Call    Subsequent and Final Calls  Care Transitions Interventions  Other Interventions:            Follow Up  Future Appointments   Date Time Provider Elizabeth Rosenbaum   2022 11:45 AM Raleigh Najjar, MD [facility] Rodrick Parkers

## 2022-07-13 ENCOUNTER — CARE COORDINATION (OUTPATIENT)
Dept: CASE MANAGEMENT | Age: 57
End: 2022-07-13

## 2022-07-13 NOTE — CARE COORDINATION
Caller Is requesting a call back regarding a medication refill Albuterol Sulfate (2.5 MG/3ML) 0.083%        Two unsuccessful attempts, resolving.   Dori Valdovinos LPN, [facility] 30: [phone]

## 2022-08-11 ENCOUNTER — TELEPHONE (OUTPATIENT)
Dept: FAMILY MEDICINE CLINIC | Age: 57
End: 2022-08-11

## 2022-09-01 ENCOUNTER — NURSE TRIAGE (OUTPATIENT)
Dept: OTHER | Facility: CLINIC | Age: 57
End: 2022-09-01

## 2022-09-01 NOTE — TELEPHONE ENCOUNTER
Received call from [facility] with [employer] Complaint. Subjective: Caller states \"I'm having swelling on my left arm between the shoulder and elbow. \"     Current Symptoms: swelling where dex-com sensor was- red and warm to touch. Onset: 3 days ago; unchanged    Associated Symptoms: N/A    Pain Severity: 4/10; aching; pain increases with movement    Temperature: Denies    What has been tried: N/A    Recommended disposition: See PCP within 24 Hours    Care advice provided, patient verbalizes understanding; denies any other questions or concerns; instructed to call back for any new or worsening symptoms. Patient/Caller agrees with recommended disposition; writer provided warm transfer to [facility] for appointment scheduling     Attention Provider: Thank you for allowing me to participate in the care of your patient. The patient was connected to triage in response to information provided to the ECC/PSC. Please do not respond through this encounter as the response is not directed to a shared pool.     Reason for Disposition   Looks like a boil, infected sore, deep ulcer or other infected rash (spreading redness, pus)    Protocols used: Arm Swelling and Edema-ADULT-

## 2022-09-02 ENCOUNTER — OFFICE VISIT (OUTPATIENT)
Dept: FAMILY MEDICINE CLINIC | Age: 57
End: 2022-09-02
Payer: COMMERCIAL

## 2022-09-02 VITALS
TEMPERATURE: 98.4 F | WEIGHT: 244 LBS | SYSTOLIC BLOOD PRESSURE: 124 MMHG | BODY MASS INDEX: 36.56 KG/M2 | DIASTOLIC BLOOD PRESSURE: 80 MMHG

## 2022-09-02 DIAGNOSIS — L02.414 ABSCESS OF ARM, LEFT: Primary | ICD-10-CM

## 2022-09-02 PROCEDURE — 99213 OFFICE O/P EST LOW 20 MIN: CPT | Performed by: FAMILY MEDICINE

## 2022-09-02 RX ORDER — DOXYCYCLINE HYCLATE 100 MG
100 TABLET ORAL 2 TIMES DAILY
Qty: 20 TABLET | Refills: 0 | Status: SHIPPED | OUTPATIENT
Start: 2022-09-02 | End: 2022-09-12

## 2022-09-02 RX ORDER — POTASSIUM CITRATE 15 MEQ/1
3 TABLET, EXTENDED RELEASE ORAL DAILY
COMMUNITY
Start: 2022-09-01

## 2022-09-02 RX ORDER — M-VIT,TX,IRON,MINS/CALC/FOLIC 27MG-0.4MG
1 TABLET ORAL DAILY
COMMUNITY

## 2022-09-02 ASSESSMENT — ENCOUNTER SYMPTOMS
ABDOMINAL PAIN: 0
SHORTNESS OF BREATH: 0

## 2022-09-02 NOTE — PROGRESS NOTES
SUBJECTIVE:    Luisa Snyder is a 56 y.o. male who presents for a follow up visit. Chief Complaint   Patient presents with    Arm Pain     Left arm sore/redness from Dexcom meter x 2-3 days. Has been using Dexcom for the past 6 months with no problems. Arm Pain   The incident occurred 2 days ago. The incident occurred at home. There was no injury mechanism. Pain location: Left upper arm. The quality of the pain is described as aching. The pain is mild. The pain has been Constant since the incident. Pertinent negatives include no chest pain. Patient's medications, allergies, past medical,surgical, social and family histories were reviewed and updated as appropriate.      Past Medical History:   Diagnosis Date    Diabetes mellitus ([address]     Hypertension     Obesity, unspecified     MARIA A on CPAP     Other abnormal blood chemistry     Seasonal allergies     Unspecified sleep apnea      Past Surgical History:   Procedure Laterality Date    CATARACT REMOVAL WITH IMPLANT  2009, 2010    B eyes    CYSTOSCOPY Left 8/24/2021    CYSTOSCOPY, LEFT RETROGRADE PYELOGRAM, PLACEMENT OF LEFT URETERAL STENT performed by Nola Swanson MD at [address] Left 9/2/2021    CYSTOSCOPY WITH LEFT URETEROSCOPY WITH HOLMIUM LASER LITHOTRIPSY, STONE MANIPULATION AND LEFT STENT REMOVAL performed by Nola Swanson MD at [address] Left 4/9/2022    CYSTOSCOPY LEFT URETERAL STENT INSERTION performed by Nola Swanson MD at [address] Left 6/25/2022    CYSTOSCOPY  LEFT URETERAL STENT INSERTION performed by Henrik Salmeron MD at  [address] EXTRACTION  08/2018     Family History   Problem Relation Age of Onset    High Blood Pressure Father     Other Father     Arthritis Mother     Other Mother     Kidney stones Mother      Social History     Tobacco Use    Smoking status: Never    Smokeless tobacco: Never   Substance Use Topics    Alcohol use: Yes     Comment: socially Allergies   Allergen Reactions    Ace Inhibitors      COUGH      Pollen Extract      Current Outpatient Medications on File Prior to Visit   Medication Sig Dispense Refill    Potassium Citrate ER (UROCIT-K) 15 MEQ (1620 MG) TBCR extended release tablet 3 tablets daily      Multiple Vitamins-Minerals (THERAPEUTIC MULTIVITAMIN-MINERALS) tablet Take 1 tablet by mouth daily      tamsulosin (FLOMAX) 0.4 MG capsule Take 1 capsule by mouth daily 30 capsule 3    oxybutynin (DITROPAN) 5 MG tablet Take 1 tablet by mouth 3 times daily 90 tablet 3    amLODIPine (NORVASC) 5 MG tablet TAKE 1 TABLET BY MOUTH EVERY DAY 90 tablet 1    azelastine (ASTELIN) 0.1 % nasal spray 1 spray by Nasal route 2 times daily Use in each nostril as directed (Patient taking differently: 1 spray by Nasal route as needed Use in each nostril as directed) 1 Bottle 5    aspirin 81 MG EC tablet Take 81 mg by mouth daily      hyoscyamine (LEVSIN) 125 MCG tablet Take 1 tablet by mouth every 4 hours as needed for Cramping (Patient taking differently: Take 125 mcg by mouth as needed for Cramping) 50 tablet 3    albuterol sulfate HFA (PROAIR HFA) 108 (90 Base) MCG/ACT inhaler Inhale 2 puffs into the lungs 4 times daily as needed for Wheezing 3 Inhaler 2    famotidine (PEPCID) 20 MG tablet Take 20 mg by mouth as needed       glucose blood VI test strips (ONE TOUCH ULTRA TEST) strip Tests once daily (Patient not taking: Reported on 9/2/2022) 100 each 3    Blood Glucose Monitoring Suppl (ONE TOUCH ULTRA) by Does not apply route. (Patient not taking: Reported on 9/2/2022)       No current facility-administered medications on file prior to visit. Review of Systems   Constitutional:  Negative for chills and fever. Respiratory:  Negative for shortness of breath. Cardiovascular:  Negative for chest pain and leg swelling. Gastrointestinal:  Negative for abdominal pain. Psychiatric/Behavioral:  Negative for sleep disturbance.       OBJECTIVE:    BP

## 2022-09-22 ENCOUNTER — HOSPITAL ENCOUNTER (OUTPATIENT)
Age: 57
Discharge: HOME | End: 2022-09-22
Payer: COMMERCIAL

## 2022-09-22 DIAGNOSIS — N17.9 ACUTE RENAL FAILURE, UNSPECIFIED ACUTE RENAL FAILURE TYPE (HCC): ICD-10-CM

## 2022-09-22 LAB
ALBUMIN SERPL-MCNC: 4.5 G/DL (ref 3.4–5)
ANION GAP SERPL CALCULATED.3IONS-SCNC: 9 MMOL/L (ref 3–16)
BUN BLDV-MCNC: 28 MG/DL (ref 7–20)
CALCIUM SERPL-MCNC: 9.8 MG/DL (ref 8.3–10.6)
CHLORIDE BLD-SCNC: 101 MMOL/L (ref 99–110)
CO2: 28 MMOL/L (ref 21–32)
CREAT SERPL-MCNC: 1.2 MG/DL (ref 0.9–1.3)
CREATININE URINE: 108.7 MG/DL (ref 39–259)
GFR AFRICAN AMERICAN: >60
GFR NON-AFRICAN AMERICAN: >60
GLUCOSE BLD-MCNC: 118 MG/DL (ref 70–99)
HCT VFR BLD CALC: 46.4 % (ref 40.5–52.5)
HEMOGLOBIN: 16 G/DL (ref 13.5–17.5)
MCH RBC QN AUTO: 33.1 PG (ref 26–34)
MCHC RBC AUTO-ENTMCNC: 34.4 G/DL (ref 31–36)
MCV RBC AUTO: 96.2 FL (ref 80–100)
MICROALBUMIN UR-MCNC: 4.2 MG/DL
MICROALBUMIN/CREAT UR-RTO: 38.6 MG/G (ref 0–30)
PDW BLD-RTO: 14 % (ref 12.4–15.4)
PHOSPHORUS: 4 MG/DL (ref 2.5–4.9)
PLATELET # BLD: 214 K/UL (ref 135–450)
PMV BLD AUTO: 8.8 FL (ref 5–10.5)
POTASSIUM SERPL-SCNC: 4.5 MMOL/L (ref 3.5–5.1)
PROTEIN PROTEIN: 11 MG/DL
PROTEIN/CREAT RATIO: 0.1 MG/DL
RBC # BLD: 4.82 M/UL (ref 4.2–5.9)
SODIUM BLD-SCNC: 138 MMOL/L (ref 136–145)
WBC # BLD: 6.8 K/UL (ref 4–11)

## 2022-09-22 PROCEDURE — 36415 COLL VENOUS BLD VENIPUNCTURE: CPT

## 2022-09-22 PROCEDURE — 84156 ASSAY OF PROTEIN URINE: CPT

## 2022-09-22 PROCEDURE — 80069 RENAL FUNCTION PANEL: CPT

## 2022-09-22 PROCEDURE — 82570 ASSAY OF URINE CREATININE: CPT

## 2022-09-22 PROCEDURE — 85027 COMPLETE CBC AUTOMATED: CPT

## 2022-09-22 PROCEDURE — 82043 UR ALBUMIN QUANTITATIVE: CPT

## 2022-10-04 RX ORDER — GLIMEPIRIDE 2 MG/1
TABLET ORAL
Qty: 90 TABLET | Refills: 0 | OUTPATIENT
Start: 2022-10-04

## 2022-10-04 RX ORDER — AMLODIPINE BESYLATE 5 MG/1
TABLET ORAL
Qty: 90 TABLET | Refills: 0 | Status: SHIPPED | OUTPATIENT
Start: 2022-10-04

## 2022-10-04 NOTE — TELEPHONE ENCOUNTER
Pt called in to get refills, but needs to be seen first. Pt has appointment scheduled for 12/5. Wants to get enough meds to get to that time.   glimepiride (AMARYL) 2 MG tablet [[id number]]  DISCONTINUED    Order Details  Dose, Route, Frequency: As Directed   Dispense Quantity: 30 tablet Refills: 0    Note to Pharmacy: DUE FOR APPT BEFORE NEXT REFILL         Sig: TAKE 1 TABLET BY MOUTH EVERY DAY IN THE MORNING-   amLODIPine (NORVASC) 5 MG tablet [[id number]]     Order Details  Dose, Route, Frequency: As Directed   Dispense Quantity: 90 tablet Refills: 1          Sig: TAKE 1 TABLET BY MOUTH EVERY DAY         [facility]/pharmacy #[id number]- [place], [place] - [address] - P [phone] - F [phone]   [address], [facility] [address]   Phone:  [phone]  Fax:  [phone]

## 2022-10-05 RX ORDER — GLIMEPIRIDE 2 MG/1
TABLET ORAL
Qty: 90 TABLET | Refills: 0 | Status: SHIPPED | OUTPATIENT
Start: 2022-10-05

## 2022-10-05 NOTE — TELEPHONE ENCOUNTER
Pt called again stating the pharmacy told him that his glimepiride was refused, and was adamant that he needs that refill. He would like to speak to a nurse or Dr Jonathan Cabral to find out why he can no longer get this medication.    Please advise

## 2022-10-10 RX ORDER — BLOOD-GLUCOSE SENSOR
EACH MISCELLANEOUS
Qty: 3 EACH | Refills: 10 | Status: SHIPPED | OUTPATIENT
Start: 2022-10-10

## 2022-10-10 NOTE — TELEPHONE ENCOUNTER
Medication:   Requested Prescriptions     Pending Prescriptions Disp Refills    Continuous Blood Gluc Sensor (DEXCOM G6 SENSOR) MISC [Pharmacy Med Name: Meera Abarca [address] 3 each 10     Sig: Use for continuous blood glucose monitoring, replace sensor every 10 days. Patient Phone Number: [phone] (home) [phone] (work)    Last appt: 9/2/2022   Next appt: 12/5/2022    Last OARRS: No flowsheet data found.   PDMP Monitoring:    Last PDMP Sonia Machado as Reviewed [facility]):  Review User Review Instant Review Result   Adalgisa Reilly 1/14/2022  4:50 PM Reviewed PDMP [1]     Preferred Pharmacy:   [facility] James [facility], [address] [place] [address] [phone] Lexis Goel [phone]  [address] [place] [address]  Phone: [phone] Fax: [phone] by [address] [place] [address], [address] [place] [address],[address] - [place] [address] [phone] Lexis Ping [phone]  [address] [place] [address],[address] [address]  Phone: [phone] Fax: [phone]

## 2022-10-13 RX ORDER — BLOOD-GLUCOSE SENSOR
EACH MISCELLANEOUS
Qty: 1 EACH | Refills: 2 | OUTPATIENT
Start: 2022-10-13

## 2022-12-01 ENCOUNTER — HOSPITAL ENCOUNTER (OUTPATIENT)
Age: 57
Discharge: HOME | End: 2022-12-01
Payer: COMMERCIAL

## 2022-12-01 DIAGNOSIS — I12.9 TYPE 2 DM WITH CKD STAGE 2 AND HYPERTENSION (HCC): ICD-10-CM

## 2022-12-01 DIAGNOSIS — N20.0 CALCULUS OF KIDNEY: ICD-10-CM

## 2022-12-01 DIAGNOSIS — N18.2 TYPE 2 DM WITH CKD STAGE 2 AND HYPERTENSION (HCC): ICD-10-CM

## 2022-12-01 DIAGNOSIS — E11.22 TYPE 2 DM WITH CKD STAGE 2 AND HYPERTENSION (HCC): ICD-10-CM

## 2022-12-01 DIAGNOSIS — I10 ESSENTIAL HYPERTENSION: ICD-10-CM

## 2022-12-01 LAB
ALBUMIN SERPL-MCNC: 4.3 G/DL (ref 3.4–5)
ANION GAP SERPL CALCULATED.3IONS-SCNC: 17 MMOL/L (ref 3–16)
BACTERIA: ABNORMAL /HPF
BILIRUBIN URINE: NEGATIVE
BLOOD, URINE: NEGATIVE
BUN BLDV-MCNC: 27 MG/DL (ref 7–20)
CALCIUM SERPL-MCNC: 9.4 MG/DL (ref 8.3–10.6)
CHLORIDE BLD-SCNC: 103 MMOL/L (ref 99–110)
CLARITY: CLEAR
CO2: 24 MMOL/L (ref 21–32)
COLOR: YELLOW
CREAT SERPL-MCNC: 1.2 MG/DL (ref 0.9–1.3)
CREATININE URINE: 80 MG/DL (ref 39–259)
ESTIMATED AVERAGE GLUCOSE: 128.4 MG/DL
GFR SERPL CREATININE-BSD FRML MDRD: >60 ML/MIN/{1.73_M2}
GLUCOSE BLD-MCNC: 137 MG/DL (ref 70–99)
GLUCOSE URINE: NEGATIVE MG/DL
HBA1C MFR BLD: 6.1 %
HCT VFR BLD CALC: 46.6 % (ref 40.5–52.5)
HEMOGLOBIN: 16.1 G/DL (ref 13.5–17.5)
KETONES, URINE: NEGATIVE MG/DL
LEUKOCYTE ESTERASE, URINE: NEGATIVE
MCH RBC QN AUTO: 33.5 PG (ref 26–34)
MCHC RBC AUTO-ENTMCNC: 34.6 G/DL (ref 31–36)
MCV RBC AUTO: 96.9 FL (ref 80–100)
MICROSCOPIC EXAMINATION: YES
NITRITE, URINE: NEGATIVE
PDW BLD-RTO: 14.1 % (ref 12.4–15.4)
PH UA: 6 (ref 5–8)
PHOSPHORUS: 3.9 MG/DL (ref 2.5–4.9)
PLATELET # BLD: 214 K/UL (ref 135–450)
PMV BLD AUTO: 8.8 FL (ref 5–10.5)
POTASSIUM SERPL-SCNC: 4.1 MMOL/L (ref 3.5–5.1)
PROTEIN PROTEIN: 18 MG/DL
PROTEIN UA: ABNORMAL MG/DL
PROTEIN/CREAT RATIO: 0.2 MG/DL
RBC # BLD: 4.81 M/UL (ref 4.2–5.9)
RBC UA: ABNORMAL /HPF (ref 0–4)
SODIUM BLD-SCNC: 144 MMOL/L (ref 136–145)
SPECIFIC GRAVITY UA: 1.01 (ref 1–1.03)
URIC ACID, SERUM: 7 MG/DL (ref 3.5–7.2)
URINE TYPE: ABNORMAL
UROBILINOGEN, URINE: 0.2 E.U./DL
WBC # BLD: 6.3 K/UL (ref 4–11)
WBC UA: ABNORMAL /HPF (ref 0–5)

## 2022-12-01 PROCEDURE — 84550 ASSAY OF BLOOD/URIC ACID: CPT

## 2022-12-01 PROCEDURE — 85027 COMPLETE CBC AUTOMATED: CPT

## 2022-12-01 PROCEDURE — 80069 RENAL FUNCTION PANEL: CPT

## 2022-12-01 PROCEDURE — 82570 ASSAY OF URINE CREATININE: CPT

## 2022-12-01 PROCEDURE — 81001 URINALYSIS AUTO W/SCOPE: CPT

## 2022-12-01 PROCEDURE — 83036 HEMOGLOBIN GLYCOSYLATED A1C: CPT

## 2022-12-01 PROCEDURE — 84156 ASSAY OF PROTEIN URINE: CPT

## 2022-12-01 PROCEDURE — 36415 COLL VENOUS BLD VENIPUNCTURE: CPT

## 2022-12-05 ENCOUNTER — OFFICE VISIT (OUTPATIENT)
Dept: FAMILY MEDICINE CLINIC | Age: 57
End: 2022-12-05
Payer: COMMERCIAL

## 2022-12-05 VITALS
OXYGEN SATURATION: 96 % | HEART RATE: 104 BPM | SYSTOLIC BLOOD PRESSURE: 130 MMHG | TEMPERATURE: 98.4 F | DIASTOLIC BLOOD PRESSURE: 72 MMHG | BODY MASS INDEX: 38.47 KG/M2 | WEIGHT: 253 LBS

## 2022-12-05 DIAGNOSIS — E11.9 TYPE 2 DIABETES MELLITUS WITHOUT COMPLICATION, WITHOUT LONG-TERM CURRENT USE OF INSULIN (HCC): Primary | ICD-10-CM

## 2022-12-05 DIAGNOSIS — J45.20 MILD INTERMITTENT ASTHMA WITHOUT COMPLICATION: ICD-10-CM

## 2022-12-05 DIAGNOSIS — N20.0 NEPHROLITHIASIS: ICD-10-CM

## 2022-12-05 DIAGNOSIS — H81.10 BENIGN PAROXYSMAL POSITIONAL VERTIGO, UNSPECIFIED LATERALITY: ICD-10-CM

## 2022-12-05 DIAGNOSIS — G47.30 SLEEP APNEA, UNSPECIFIED TYPE: ICD-10-CM

## 2022-12-05 PROCEDURE — 3044F HG A1C LEVEL LT 7.0%: CPT | Performed by: FAMILY MEDICINE

## 2022-12-05 PROCEDURE — 99214 OFFICE O/P EST MOD 30 MIN: CPT | Performed by: FAMILY MEDICINE

## 2022-12-05 PROCEDURE — 3074F SYST BP LT 130 MM HG: CPT | Performed by: FAMILY MEDICINE

## 2022-12-05 PROCEDURE — 3078F DIAST BP <80 MM HG: CPT | Performed by: FAMILY MEDICINE

## 2022-12-05 RX ORDER — GLIMEPIRIDE 1 MG/1
TABLET ORAL
Qty: 90 TABLET | Refills: 1 | Status: SHIPPED | OUTPATIENT
Start: 2022-12-05

## 2022-12-05 RX ORDER — AMLODIPINE BESYLATE 5 MG/1
TABLET ORAL
Qty: 90 TABLET | Refills: 1 | Status: SHIPPED | OUTPATIENT
Start: 2022-12-05

## 2022-12-05 ASSESSMENT — PATIENT HEALTH QUESTIONNAIRE - PHQ9
SUM OF ALL RESPONSES TO PHQ QUESTIONS 1-9: 0
SUM OF ALL RESPONSES TO PHQ9 QUESTIONS 1 & 2: 0
SUM OF ALL RESPONSES TO PHQ QUESTIONS 1-9: 0
1. LITTLE INTEREST OR PLEASURE IN DOING THINGS: 0
2. FEELING DOWN, DEPRESSED OR HOPELESS: 0

## 2022-12-05 NOTE — PROGRESS NOTES
Subjective:      Patient ID: Jake Lake is a 56 y.o. male. CC: Patient presents for re-evaluation of chronic health problems including diabetes mellitus, acute kidney injury history, recurrent nephrolithiasis, and asthma with dizziness. HPIPatient presents today for a follow-up on chronic medications and medical conditions. Patient is not been evaluated for his diabetic management for some time. He has had recurrent hospitalizations for nephrolithiasis and was recently started on allopurinol for kidney stone prevention. Patient has had some dizzy spells while sitting in a chair or at  his computer. He does not seem to have any dizziness any other time and does not affect him when he is driving or laying down at nighttime. He does not feel the asthma is been a significant issue for him. He is trying to find a new mask for his CPAP as his current mask is not fitting well. Eye exam current (within one year): Yes    Checks sugars at home: yes  Home blood sugar records: patient tests 2 time(s) per day  Any episodes of hypoglycemia? No    Current medication use: taking as prescribed  Medication side effects: none     Current diet:  working on diet; eats 2 meals a day  Current exercise:walking around at work       Review of Systems      Patient Active Problem List   Diagnosis    Essential hypertension    Obesity    Sleep apnea    Type 2 diabetes mellitus without complication, without long-term current use of insulin (HCC)    Mild intermittent asthma without complication    Carpal tunnel syndrome of right wrist    Nephrolithiasis    ONOFRE (acute kidney injury) ([facility]       Outpatient Medications Marked as Taking for the 12/5/22 encounter (Office Visit) with Magy Martínez MD   Medication Sig Dispense Refill    Continuous Blood Gluc Sensor (DEXCOM G6 SENSOR) MISC Use for continuous blood glucose monitoring, replace sensor every 10 days.  3 each 10    allopurinol (ZYLOPRIM) 100 MG tablet Take 1 tablet by mouth daily 90 tablet 1    glimepiride (AMARYL) 2 MG tablet TAKE 1 TABLET BY MOUTH EVERY DAY IN THE MORNING- 90 tablet 0    amLODIPine (NORVASC) 5 MG tablet TAKE 1 TABLET BY MOUTH EVERY DAY 90 tablet 0    Potassium Citrate ER (UROCIT-K) 15 MEQ (1620 MG) TBCR extended release tablet 3 tablets daily      Multiple Vitamins-Minerals (THERAPEUTIC MULTIVITAMIN-MINERALS) tablet Take 1 tablet by mouth daily      azelastine (ASTELIN) 0.1 % nasal spray 1 spray by Nasal route 2 times daily Use in each nostril as directed (Patient taking differently: 1 spray by Nasal route as needed Use in each nostril as directed) 1 Bottle 5    aspirin 81 MG EC tablet Take 81 mg by mouth daily      albuterol sulfate HFA (PROAIR HFA) 108 (90 Base) MCG/ACT inhaler Inhale 2 puffs into the lungs 4 times daily as needed for Wheezing 3 Inhaler 2    famotidine (PEPCID) 20 MG tablet Take 20 mg by mouth as needed          Allergies   Allergen Reactions    Ace Inhibitors Cough     COUGH      Pollen Extract        Social History     Tobacco Use    Smoking status: Never    Smokeless tobacco: Never   Substance Use Topics    Alcohol use: Yes     Comment: socially       /72 (Site: Right Upper Arm, Position: Sitting, Cuff Size: Large Adult)   Pulse (!) 104   Temp 98.4 °F (36.9 °C) (Infrared)   Wt 253 lb (114.8 kg)   SpO2 96%   BMI 38.47 kg/m²         Objective:   Physical Exam  Constitutional:       General: He is not in acute distress. Appearance: He is well-developed. Neck:      Vascular: No carotid bruit. Cardiovascular:      Rate and Rhythm: Normal rate and regular rhythm. Pulses:           Dorsalis pedis pulses are 2+ on the right side and 2+ on the left side. Posterior tibial pulses are 2+ on the right side and 2+ on the left side. Heart sounds: Normal heart sounds. No murmur heard. No friction rub. No gallop. Pulmonary:      Effort: Pulmonary effort is normal.      Breath sounds: Normal breath sounds. Musculoskeletal:         General: Normal range of motion. Right lower leg: No edema. Left lower leg: No edema. Right foot: Normal.      Left foot: Normal.   Neurological:      Mental Status: He is alert and oriented to person, place, and time. Sensory: Sensation is intact. Motor: Motor function is intact. Deep Tendon Reflexes: Reflexes are normal and symmetric. Comments: 12 point monofilament test normal    Psychiatric:         Behavior: Behavior is cooperative. Assessment:      Marvin Sanchez was seen today for follow-up and diabetes. Diagnoses and all orders for this visit:    Type 2 diabetes mellitus without complication, without long-term current use of insulin ([facility])  -      DIABETES FOOT EXAM  -     Comprehensive Metabolic Panel; Future  -     Hemoglobin A1C; Future  -     Lipid Panel; Future    Nephrolithiasis    Mild intermittent asthma without complication    Benign paroxysmal positional vertigo, unspecified laterality    Sleep apnea, unspecified type    Other orders  -     glimepiride (AMARYL) 1 MG tablet; TAKE 1 TABLET BY MOUTH EVERY DAY IN THE MORNING-  -     amLODIPine (NORVASC) 5 MG tablet; TAKE 1 TABLET BY MOUTH EVERY DAY          Plan:      Reviewed labs and test results with patient. Diabetes extremely well controlled and patient was advised to stop glimepiride when he left the hospital last visit but he is continue take medication. Right now has not experienced any hypoglycemic episodes but plan to decrease the glimepiride down to 1 mg. He may not really need it at this point in time. This should be reevaluated in next 3 months. Discussed with him kidney stone prevention and agree with allopurinol at this time and encourage patient drink 6 to 8 glasses of fluid a day and avoid soda. Patient was referred to [facility] for mask fitting for the CPAP machine.     Patient received counseling on the following healthy behaviors: nutrition and exercise Patient given educational materials     Health maintenance updated    Discussed use, benefit, and side effects of prescribed medications. Barriers to medication compliance addressed. All patient questions answered. Pt voiced understanding. Patient needs RTC in 4 months. Medical decision making of moderate complexity. Please note that this chart was generated using Dragon dictation software. Although every effort was made to ensure the accuracy of this automated transcription, some errors in transcription may have occurred.

## 2023-01-03 RX ORDER — GLIMEPIRIDE 2 MG/1
TABLET ORAL
Qty: 90 TABLET | Refills: 0 | Status: SHIPPED | OUTPATIENT
Start: 2023-01-03

## 2023-03-31 ENCOUNTER — HOSPITAL ENCOUNTER (OUTPATIENT)
Age: 58
Discharge: HOME | End: 2023-03-31
Payer: COMMERCIAL

## 2023-03-31 DIAGNOSIS — E11.9 TYPE 2 DIABETES MELLITUS WITHOUT COMPLICATION, WITHOUT LONG-TERM CURRENT USE OF INSULIN (HCC): ICD-10-CM

## 2023-03-31 LAB
ALBUMIN SERPL-MCNC: 4.1 G/DL (ref 3.4–5)
ALBUMIN/GLOB SERPL: 1.3 {RATIO} (ref 1.1–2.2)
ALP SERPL-CCNC: 61 U/L (ref 40–129)
ALT SERPL-CCNC: 35 U/L (ref 10–40)
ANION GAP SERPL CALCULATED.3IONS-SCNC: 11 MMOL/L (ref 3–16)
AST SERPL-CCNC: 27 U/L (ref 15–37)
BILIRUB SERPL-MCNC: 0.7 MG/DL (ref 0–1)
BUN SERPL-MCNC: 21 MG/DL (ref 7–20)
CALCIUM SERPL-MCNC: 9.5 MG/DL (ref 8.3–10.6)
CHLORIDE SERPL-SCNC: 101 MMOL/L (ref 99–110)
CHOLEST SERPL-MCNC: 157 MG/DL (ref 0–199)
CO2 SERPL-SCNC: 26 MMOL/L (ref 21–32)
CREAT SERPL-MCNC: 1.2 MG/DL (ref 0.9–1.3)
EST. AVERAGE GLUCOSE BLD GHB EST-MCNC: 139.9 MG/DL
GFR SERPLBLD CREATININE-BSD FMLA CKD-EPI: >60 ML/MIN/{1.73_M2}
GLUCOSE SERPL-MCNC: 132 MG/DL (ref 70–99)
HBA1C MFR BLD: 6.5 %
HDLC SERPL-MCNC: 38 MG/DL (ref 40–60)
LDLC SERPL CALC-MCNC: 93 MG/DL
POTASSIUM SERPL-SCNC: 4.4 MMOL/L (ref 3.5–5.1)
PROT SERPL-MCNC: 7.3 G/DL (ref 6.4–8.2)
SODIUM SERPL-SCNC: 138 MMOL/L (ref 136–145)
TRIGL SERPL-MCNC: 130 MG/DL (ref 0–150)
VLDLC SERPL CALC-MCNC: 26 MG/DL

## 2023-03-31 PROCEDURE — 83036 HEMOGLOBIN GLYCOSYLATED A1C: CPT

## 2023-03-31 PROCEDURE — 36415 COLL VENOUS BLD VENIPUNCTURE: CPT

## 2023-03-31 PROCEDURE — 80061 LIPID PANEL: CPT

## 2023-03-31 PROCEDURE — 80053 COMPREHEN METABOLIC PANEL: CPT

## 2023-03-31 RX ORDER — PROCHLORPERAZINE 25 MG/1
SUPPOSITORY RECTAL
Qty: 1 EACH | Refills: 3 | Status: SHIPPED | OUTPATIENT
Start: 2023-03-31

## 2023-04-28 ENCOUNTER — OFFICE VISIT (OUTPATIENT)
Dept: FAMILY MEDICINE CLINIC | Age: 58
End: 2023-04-28
Payer: COMMERCIAL

## 2023-04-28 VITALS
HEART RATE: 102 BPM | RESPIRATION RATE: 16 BRPM | SYSTOLIC BLOOD PRESSURE: 132 MMHG | DIASTOLIC BLOOD PRESSURE: 78 MMHG | TEMPERATURE: 98.2 F | BODY MASS INDEX: 39.15 KG/M2 | WEIGHT: 257.5 LBS | OXYGEN SATURATION: 98 %

## 2023-04-28 DIAGNOSIS — G56.01 CARPAL TUNNEL SYNDROME OF RIGHT WRIST: ICD-10-CM

## 2023-04-28 DIAGNOSIS — E11.9 TYPE 2 DIABETES MELLITUS WITHOUT COMPLICATION, WITHOUT LONG-TERM CURRENT USE OF INSULIN (HCC): Primary | ICD-10-CM

## 2023-04-28 DIAGNOSIS — I10 ESSENTIAL HYPERTENSION: ICD-10-CM

## 2023-04-28 PROBLEM — E66.01 SEVERE OBESITY (BMI 35.0-39.9) WITH COMORBIDITY (HCC): Status: ACTIVE | Noted: 2023-04-28

## 2023-04-28 PROCEDURE — 3078F DIAST BP <80 MM HG: CPT | Performed by: FAMILY MEDICINE

## 2023-04-28 PROCEDURE — 99214 OFFICE O/P EST MOD 30 MIN: CPT | Performed by: FAMILY MEDICINE

## 2023-04-28 PROCEDURE — 3075F SYST BP GE 130 - 139MM HG: CPT | Performed by: FAMILY MEDICINE

## 2023-04-28 PROCEDURE — 3044F HG A1C LEVEL LT 7.0%: CPT | Performed by: FAMILY MEDICINE

## 2023-04-28 RX ORDER — GLIMEPIRIDE 1 MG/1
TABLET ORAL
Qty: 90 TABLET | Refills: 1 | Status: SHIPPED | OUTPATIENT
Start: 2023-04-28

## 2023-04-28 SDOH — ECONOMIC STABILITY: HOUSING INSECURITY
IN THE LAST 12 MONTHS, WAS THERE A TIME WHEN YOU DID NOT HAVE A STEADY PLACE TO SLEEP OR SLEPT IN A SHELTER (INCLUDING NOW)?: NO

## 2023-04-28 SDOH — ECONOMIC STABILITY: FOOD INSECURITY: WITHIN THE PAST 12 MONTHS, THE FOOD YOU BOUGHT JUST DIDN'T LAST AND YOU DIDN'T HAVE MONEY TO GET MORE.: NEVER TRUE

## 2023-04-28 SDOH — ECONOMIC STABILITY: FOOD INSECURITY: WITHIN THE PAST 12 MONTHS, YOU WORRIED THAT YOUR FOOD WOULD RUN OUT BEFORE YOU GOT MONEY TO BUY MORE.: NEVER TRUE

## 2023-04-28 SDOH — ECONOMIC STABILITY: INCOME INSECURITY: HOW HARD IS IT FOR YOU TO PAY FOR THE VERY BASICS LIKE FOOD, HOUSING, MEDICAL CARE, AND HEATING?: NOT HARD AT ALL

## 2023-04-28 ASSESSMENT — PATIENT HEALTH QUESTIONNAIRE - PHQ9
SUM OF ALL RESPONSES TO PHQ QUESTIONS 1-9: 0
1. LITTLE INTEREST OR PLEASURE IN DOING THINGS: 0
2. FEELING DOWN, DEPRESSED OR HOPELESS: 0
SUM OF ALL RESPONSES TO PHQ9 QUESTIONS 1 & 2: 0

## 2023-04-28 NOTE — PROGRESS NOTES
tenderness. Normal range of motion. Normal strength. Normal sensation. Normal sensation of the ulnar distribution and radial distribution. Normal capillary refill. Right lower leg: No edema. Left lower leg: No edema. Right foot: Normal.      Left foot: Normal.   Skin:     General: Skin is warm. Findings: No rash. Neurological:      Mental Status: He is alert and oriented to person, place, and time. Sensory: Sensation is intact. Motor: Motor function is intact. Deep Tendon Reflexes: Reflexes are normal and symmetric. Comments: 12 point monofilament test normal    Psychiatric:         Behavior: Behavior is cooperative. Assessment:      Kayce Lauren was seen today for follow-up, hypertension and diabetes. Diagnoses and all orders for this visit:    Type 2 diabetes mellitus without complication, without long-term current use of insulin ([address]  -     Basic Metabolic Panel; Future  -     Hemoglobin A1C; Future    Carpal tunnel syndrome of right wrist    Essential hypertension    Other orders  -     glimepiride (AMARYL) 1 MG tablet; TAKE 1 TABLET BY MOUTH EVERY DAY IN THE MORNING-            Plan:      Reviewed labs and test results with patient. Diabetes continues to be extremely well controlled with glimepiride at 1 mg strength and go ahead and maintain that medication. We discussed cholesterol management regards to diabetic and patient would prefer not to add any additional medication at this time    For the carpal tunnel syndrome recommend patient start with carpal tunnel splints to wear at nighttime and information handout provided. Continue with urology and nephrology care in regards to acute kidney injury and kidney stones. Clinically at this point his creatinine is 1.2.     Patient received counseling on the following healthy behaviors: nutrition and exercise     Patient given educational materials     Health maintenance updated    Discussed use, benefit, and side

## 2023-05-11 ENCOUNTER — OFFICE VISIT (OUTPATIENT)
Dept: FAMILY MEDICINE CLINIC | Age: 58
End: 2023-05-11
Payer: COMMERCIAL

## 2023-05-11 VITALS
BODY MASS INDEX: 39.38 KG/M2 | DIASTOLIC BLOOD PRESSURE: 84 MMHG | HEART RATE: 98 BPM | TEMPERATURE: 98.2 F | OXYGEN SATURATION: 97 % | SYSTOLIC BLOOD PRESSURE: 130 MMHG | WEIGHT: 259 LBS

## 2023-05-11 DIAGNOSIS — K11.20 PAROTIDITIS: Primary | ICD-10-CM

## 2023-05-11 PROCEDURE — 3074F SYST BP LT 130 MM HG: CPT | Performed by: NURSE PRACTITIONER

## 2023-05-11 PROCEDURE — 3078F DIAST BP <80 MM HG: CPT | Performed by: NURSE PRACTITIONER

## 2023-05-11 PROCEDURE — 99213 OFFICE O/P EST LOW 20 MIN: CPT | Performed by: NURSE PRACTITIONER

## 2023-05-11 RX ORDER — AMOXICILLIN AND CLAVULANATE POTASSIUM 875; 125 MG/1; MG/1
1 TABLET, FILM COATED ORAL 2 TIMES DAILY
Qty: 20 TABLET | Refills: 0 | Status: SHIPPED | OUTPATIENT
Start: 2023-05-11 | End: 2023-05-21

## 2023-05-11 ASSESSMENT — ENCOUNTER SYMPTOMS
DIARRHEA: 0
NAUSEA: 0
SHORTNESS OF BREATH: 0
VOMITING: 0

## 2023-05-12 RX ORDER — GLIMEPIRIDE 2 MG/1
TABLET ORAL
Qty: 90 TABLET | Refills: 2 | OUTPATIENT
Start: 2023-05-12

## 2023-06-05 RX ORDER — METFORMIN HYDROCHLORIDE 500 MG/1
TABLET, EXTENDED RELEASE ORAL
Qty: 90 TABLET | Refills: 1 | Status: SHIPPED | OUTPATIENT
Start: 2023-06-05

## 2023-06-29 ENCOUNTER — OFFICE VISIT (OUTPATIENT)
Dept: FAMILY MEDICINE CLINIC | Age: 58
End: 2023-06-29
Payer: COMMERCIAL

## 2023-06-29 VITALS
BODY MASS INDEX: 39.53 KG/M2 | TEMPERATURE: 98.1 F | SYSTOLIC BLOOD PRESSURE: 134 MMHG | WEIGHT: 260 LBS | DIASTOLIC BLOOD PRESSURE: 80 MMHG

## 2023-06-29 DIAGNOSIS — K11.20 PAROTIDITIS: Primary | ICD-10-CM

## 2023-06-29 PROCEDURE — 99213 OFFICE O/P EST LOW 20 MIN: CPT | Performed by: NURSE PRACTITIONER

## 2023-06-29 PROCEDURE — 3078F DIAST BP <80 MM HG: CPT | Performed by: NURSE PRACTITIONER

## 2023-06-29 PROCEDURE — 3074F SYST BP LT 130 MM HG: CPT | Performed by: NURSE PRACTITIONER

## 2023-06-29 RX ORDER — PREDNISONE 20 MG/1
TABLET ORAL
Qty: 10 TABLET | Refills: 0 | Status: SHIPPED | OUTPATIENT
Start: 2023-06-29

## 2023-06-29 RX ORDER — AMOXICILLIN AND CLAVULANATE POTASSIUM 875; 125 MG/1; MG/1
1 TABLET, FILM COATED ORAL 2 TIMES DAILY
Qty: 28 TABLET | Refills: 0 | Status: SHIPPED | OUTPATIENT
Start: 2023-06-29 | End: 2023-07-13

## 2023-06-29 ASSESSMENT — ENCOUNTER SYMPTOMS
SHORTNESS OF BREATH: 0
NAUSEA: 0
DIARRHEA: 0
VOMITING: 0
COUGH: 0

## 2023-09-12 RX ORDER — PROCHLORPERAZINE 25 MG/1
SUPPOSITORY RECTAL
Qty: 1 EACH | Refills: 9 | Status: SHIPPED | OUTPATIENT
Start: 2023-09-12

## 2023-09-22 ENCOUNTER — HOSPITAL ENCOUNTER (OUTPATIENT)
Age: 58
Discharge: HOME | End: 2023-09-22
Payer: COMMERCIAL

## 2023-09-22 DIAGNOSIS — E66.9 OBESITY (BMI 30-39.9): ICD-10-CM

## 2023-09-22 DIAGNOSIS — N17.9 ACUTE RENAL FAILURE, UNSPECIFIED ACUTE RENAL FAILURE TYPE (HCC): ICD-10-CM

## 2023-09-22 DIAGNOSIS — E11.22 TYPE 2 DM WITH CKD STAGE 2 AND HYPERTENSION (HCC): ICD-10-CM

## 2023-09-22 DIAGNOSIS — N18.2 TYPE 2 DM WITH CKD STAGE 2 AND HYPERTENSION (HCC): ICD-10-CM

## 2023-09-22 DIAGNOSIS — I10 ESSENTIAL HYPERTENSION: ICD-10-CM

## 2023-09-22 DIAGNOSIS — I12.9 TYPE 2 DM WITH CKD STAGE 2 AND HYPERTENSION (HCC): ICD-10-CM

## 2023-09-22 DIAGNOSIS — N20.0 CALCULUS OF KIDNEY: ICD-10-CM

## 2023-09-22 LAB
ALBUMIN SERPL-MCNC: 4.1 G/DL (ref 3.4–5)
ANION GAP SERPL CALCULATED.3IONS-SCNC: 8 MMOL/L (ref 3–16)
BACTERIA URNS QL MICRO: ABNORMAL /HPF
BILIRUB UR QL STRIP.AUTO: NEGATIVE
BUN SERPL-MCNC: 22 MG/DL (ref 7–20)
CALCIUM SERPL-MCNC: 8.6 MG/DL (ref 8.3–10.6)
CHLORIDE SERPL-SCNC: 105 MMOL/L (ref 99–110)
CLARITY UR: CLEAR
CO2 SERPL-SCNC: 28 MMOL/L (ref 21–32)
COLOR UR: YELLOW
CREAT SERPL-MCNC: 1.2 MG/DL (ref 0.9–1.3)
CREAT UR-MCNC: 112 MG/DL (ref 39–259)
DEPRECATED RDW RBC AUTO: 14.3 % (ref 12.4–15.4)
EPI CELLS #/AREA URNS AUTO: 1 /HPF (ref 0–5)
GFR SERPLBLD CREATININE-BSD FMLA CKD-EPI: >60 ML/MIN/{1.73_M2}
GLUCOSE SERPL-MCNC: 211 MG/DL (ref 70–99)
GLUCOSE UR STRIP.AUTO-MCNC: NEGATIVE MG/DL
HCT VFR BLD AUTO: 46.4 % (ref 40.5–52.5)
HGB BLD-MCNC: 16 G/DL (ref 13.5–17.5)
HGB UR QL STRIP.AUTO: NEGATIVE
HYALINE CASTS #/AREA URNS AUTO: 0 /LPF (ref 0–8)
KETONES UR STRIP.AUTO-MCNC: NEGATIVE MG/DL
LEUKOCYTE ESTERASE UR QL STRIP.AUTO: NEGATIVE
MCH RBC QN AUTO: 34 PG (ref 26–34)
MCHC RBC AUTO-ENTMCNC: 34.5 G/DL (ref 31–36)
MCV RBC AUTO: 98.5 FL (ref 80–100)
MICROALBUMIN UR DL<=1MG/L-MCNC: 22.3 MG/DL
MICROALBUMIN/CREAT UR: 199.1 MG/G (ref 0–30)
NITRITE UR QL STRIP.AUTO: NEGATIVE
PH UR STRIP.AUTO: 5 [PH] (ref 5–8)
PHOSPHATE SERPL-MCNC: 3.2 MG/DL (ref 2.5–4.9)
PLATELET # BLD AUTO: 218 K/UL (ref 135–450)
PMV BLD AUTO: 9.8 FL (ref 5–10.5)
POTASSIUM SERPL-SCNC: 4.1 MMOL/L (ref 3.5–5.1)
PROT UR STRIP.AUTO-MCNC: 30 MG/DL
PROT UR-MCNC: 37 MG/DL
PROT/CREAT UR-RTO: 0.3 MG/DL
RBC # BLD AUTO: 4.72 M/UL (ref 4.2–5.9)
RBC CLUMPS #/AREA URNS AUTO: 1 /HPF (ref 0–4)
SODIUM SERPL-SCNC: 141 MMOL/L (ref 136–145)
SP GR UR STRIP.AUTO: 1.02 (ref 1–1.03)
UA DIPSTICK W REFLEX MICRO PNL UR: YES
URN SPEC COLLECT METH UR: ABNORMAL
UROBILINOGEN UR STRIP-ACNC: 0.2 E.U./DL
WBC # BLD AUTO: 6.6 K/UL (ref 4–11)
WBC #/AREA URNS AUTO: 1 /HPF (ref 0–5)

## 2023-09-22 PROCEDURE — 81001 URINALYSIS AUTO W/SCOPE: CPT

## 2023-09-22 PROCEDURE — 83036 HEMOGLOBIN GLYCOSYLATED A1C: CPT

## 2023-09-22 PROCEDURE — 80069 RENAL FUNCTION PANEL: CPT

## 2023-09-22 PROCEDURE — 85027 COMPLETE CBC AUTOMATED: CPT

## 2023-09-22 PROCEDURE — 36415 COLL VENOUS BLD VENIPUNCTURE: CPT

## 2023-09-22 PROCEDURE — 82043 UR ALBUMIN QUANTITATIVE: CPT

## 2023-09-22 PROCEDURE — 82570 ASSAY OF URINE CREATININE: CPT

## 2023-09-22 PROCEDURE — 84156 ASSAY OF PROTEIN URINE: CPT

## 2023-09-23 LAB
EST. AVERAGE GLUCOSE BLD GHB EST-MCNC: 139.9 MG/DL
HBA1C MFR BLD: 6.5 %

## 2023-10-05 ENCOUNTER — APPOINTMENT (OUTPATIENT)
Dept: GENERAL RADIOLOGY | Age: 58
End: 2023-10-05
Payer: COMMERCIAL

## 2023-10-05 ENCOUNTER — HOSPITAL ENCOUNTER (EMERGENCY)
Age: 58
Discharge: HOME | End: 2023-10-05
Attending: INTERNAL MEDICINE
Payer: COMMERCIAL

## 2023-10-05 ENCOUNTER — OFFICE VISIT (OUTPATIENT)
Dept: ORTHOPEDIC SURGERY | Age: 58
End: 2023-10-05
Payer: COMMERCIAL

## 2023-10-05 ENCOUNTER — TELEPHONE (OUTPATIENT)
Dept: ORTHOPEDIC SURGERY | Age: 58
End: 2023-10-05

## 2023-10-05 VITALS — WEIGHT: 257 LBS | HEIGHT: 68 IN | BODY MASS INDEX: 38.95 KG/M2

## 2023-10-05 VITALS
TEMPERATURE: 98.4 F | DIASTOLIC BLOOD PRESSURE: 91 MMHG | HEIGHT: 68 IN | OXYGEN SATURATION: 99 % | WEIGHT: 257.3 LBS | BODY MASS INDEX: 39 KG/M2 | HEART RATE: 99 BPM | RESPIRATION RATE: 18 BRPM | SYSTOLIC BLOOD PRESSURE: 167 MMHG

## 2023-10-05 DIAGNOSIS — S42.017A CLOSED NONDISPLACED FRACTURE OF STERNAL END OF RIGHT CLAVICLE, INITIAL ENCOUNTER: Primary | ICD-10-CM

## 2023-10-05 PROCEDURE — 23500 CLTX CLAVICULAR FX W/O MNPJ: CPT | Performed by: ORTHOPAEDIC SURGERY

## 2023-10-05 PROCEDURE — 99203 OFFICE O/P NEW LOW 30 MIN: CPT | Performed by: ORTHOPAEDIC SURGERY

## 2023-10-05 PROCEDURE — 73030 X-RAY EXAM OF SHOULDER: CPT

## 2023-10-05 PROCEDURE — 99283 EMERGENCY DEPT VISIT LOW MDM: CPT

## 2023-10-05 RX ORDER — HYDROCODONE BITARTRATE AND ACETAMINOPHEN 5; 325 MG/1; MG/1
1 TABLET ORAL EVERY 4 HOURS PRN
Qty: 18 TABLET | Refills: 0 | Status: SHIPPED | OUTPATIENT
Start: 2023-10-05 | End: 2023-10-08

## 2023-10-05 RX ORDER — IBUPROFEN 600 MG/1
600 TABLET ORAL ONCE
Status: DISCONTINUED | OUTPATIENT
Start: 2023-10-05 | End: 2023-10-05 | Stop reason: HOSPADM

## 2023-10-05 ASSESSMENT — PAIN DESCRIPTION - LOCATION: LOCATION: SHOULDER

## 2023-10-05 ASSESSMENT — PATIENT HEALTH QUESTIONNAIRE - PHQ9
1. LITTLE INTEREST OR PLEASURE IN DOING THINGS: 0
2. FEELING DOWN, DEPRESSED OR HOPELESS: 0
SUM OF ALL RESPONSES TO PHQ QUESTIONS 1-9: 0
SUM OF ALL RESPONSES TO PHQ9 QUESTIONS 1 & 2: 0

## 2023-10-05 ASSESSMENT — PAIN - FUNCTIONAL ASSESSMENT: PAIN_FUNCTIONAL_ASSESSMENT: 0-10

## 2023-10-05 ASSESSMENT — PAIN DESCRIPTION - ORIENTATION: ORIENTATION: RIGHT

## 2023-10-05 ASSESSMENT — LIFESTYLE VARIABLES
HOW MANY STANDARD DRINKS CONTAINING ALCOHOL DO YOU HAVE ON A TYPICAL DAY: 1 OR 2
HOW OFTEN DO YOU HAVE A DRINK CONTAINING ALCOHOL: MONTHLY OR LESS

## 2023-10-05 ASSESSMENT — PAIN SCALES - GENERAL: PAINLEVEL_OUTOF10: 9

## 2023-10-05 NOTE — TELEPHONE ENCOUNTER
Patient referred to Dr. Ezequiel Ceballos from Emergency Department. Called patient in regards to scheduling an appointment to follow up with orthopedics.      Patient was scheduled for 2:00 10/5 at the [facility] and [address] [facility].[address]

## 2023-10-05 NOTE — PROGRESS NOTES
swelling and minimal ecchymosis. He is tender to palpation over the clavicle, and otherwise nontender over the remainder of the extremity. Range of motion is decreased secondary to pain over the right shoulder. The skin overlying the right shoulder is intact without evidence of lesion, laceration or skin tenting. Distal pulses are 2+ and symmetric bilaterally. Sensation is grossly intact to light touch and symmetric bilaterally. IMAGING:  Xrays dated 10/5/2023 taken at [facility], 2 views of right clavicle were reviewed, and showed minimally displaced sternal end of the clavicle fracture. IMPRESSION:  Right minimally displaced sternal and clavicle fracture. PLAN:  I discussed that the overall alignment of this fracture is good and that we can try to treat this non-operatively in a sling right shoulder as needed, with no heavy impact activities, and gentle ROM. We discussed the risk of nonunion and or malunion. Rest and ice to help with swelling and pain. No pushing, pulling or lifting with the right hand for 6 weeks. We will see him  back in 6 weeks at which time we will get a new xray of the right clavicle. He can return back to work light duty for the next 6 weeks.     Ethan Hirsch MD

## 2023-10-05 NOTE — ED PROVIDER NOTES
EMERGENCY MEDICINE PROVIDER NOTE    Patient Identification  Pt Name: Giselle Baez  MRN: [id number]  [address] 1965  Date of evaluation: 10/5/2023  Provider: Blaise Burleson DO  PCP: Aiden Lr MD    Chief Complaint  Shoulder Injury (Pt arrives to ED vai self c/o right shoulder pain after pulling a boot off of a heater at work, states when the object finally became loose, he heard a pop in his shoulder. Pt states he is able to move, but has a lot of pain when he does move.)      HPI  (History provided by patient)  This is a 66 y.o. male who was brought in by self for right shoulder pain. Patient was pulling a boot off of a heater at work and when the boot started to loosen up and give way he felt a pop in his right shoulder. He had a sudden onset of pain. He is able to move his shoulder but it is painful. He is only able to move the shoulder at about 90 degrees. Patient denies numbness and tingling of his arm.     I have reviewed the following nursing documentation:  Allergies: Ace inhibitors and Pollen extract    Past medical history:   Past Medical History:   Diagnosis Date    Diabetes mellitus ([address])     Hypertension     Obesity, unspecified     MARIA A on CPAP     Other abnormal blood chemistry     Seasonal allergies     Unspecified sleep apnea      Past surgical history:   Past Surgical History:   Procedure Laterality Date    CATARACT REMOVAL WITH IMPLANT  2009, 2010    B eyes    CYSTOSCOPY Left 08/24/2021    CYSTOSCOPY, LEFT RETROGRADE PYELOGRAM, PLACEMENT OF LEFT URETERAL STENT performed by Nikos Ferrer MD at [address] [place] [address] Left 09/02/2021    CYSTOSCOPY WITH LEFT URETEROSCOPY WITH HOLMIUM LASER LITHOTRIPSY, STONE MANIPULATION AND LEFT STENT REMOVAL performed by Nikos Ferrer MD at [address] [facility] Left 04/09/2022    CYSTOSCOPY LEFT URETERAL STENT INSERTION performed by Nikos Ferrer MD at [address] [place] [address] Left 06/25/2022    CYSTOSCOPY  LEFT URETERAL STENT

## 2023-10-06 PROBLEM — S42.017A CLOSED NONDISPLACED FRACTURE OF STERNAL END OF RIGHT CLAVICLE: Status: ACTIVE | Noted: 2023-10-06

## 2023-11-02 ENCOUNTER — OFFICE VISIT (OUTPATIENT)
Dept: FAMILY MEDICINE CLINIC | Age: 58
End: 2023-11-02
Payer: COMMERCIAL

## 2023-11-02 VITALS
HEART RATE: 108 BPM | BODY MASS INDEX: 39.53 KG/M2 | SYSTOLIC BLOOD PRESSURE: 130 MMHG | OXYGEN SATURATION: 98 % | RESPIRATION RATE: 12 BRPM | WEIGHT: 260 LBS | DIASTOLIC BLOOD PRESSURE: 82 MMHG | TEMPERATURE: 98.1 F

## 2023-11-02 DIAGNOSIS — E66.01 SEVERE OBESITY (BMI 35.0-39.9) WITH COMORBIDITY (HCC): ICD-10-CM

## 2023-11-02 DIAGNOSIS — R80.9 TYPE 2 DIABETES MELLITUS WITH MICROALBUMINURIA, WITHOUT LONG-TERM CURRENT USE OF INSULIN (HCC): Primary | ICD-10-CM

## 2023-11-02 DIAGNOSIS — E11.29 TYPE 2 DIABETES MELLITUS WITH MICROALBUMINURIA, WITHOUT LONG-TERM CURRENT USE OF INSULIN (HCC): Primary | ICD-10-CM

## 2023-11-02 DIAGNOSIS — J45.20 MILD INTERMITTENT ASTHMA WITHOUT COMPLICATION: ICD-10-CM

## 2023-11-02 DIAGNOSIS — E11.9 TYPE 2 DIABETES MELLITUS WITHOUT COMPLICATION, WITHOUT LONG-TERM CURRENT USE OF INSULIN (HCC): ICD-10-CM

## 2023-11-02 DIAGNOSIS — I10 ESSENTIAL HYPERTENSION: ICD-10-CM

## 2023-11-02 PROCEDURE — 3044F HG A1C LEVEL LT 7.0%: CPT | Performed by: FAMILY MEDICINE

## 2023-11-02 PROCEDURE — 3075F SYST BP GE 130 - 139MM HG: CPT | Performed by: FAMILY MEDICINE

## 2023-11-02 PROCEDURE — 3079F DIAST BP 80-89 MM HG: CPT | Performed by: FAMILY MEDICINE

## 2023-11-02 PROCEDURE — 99214 OFFICE O/P EST MOD 30 MIN: CPT | Performed by: FAMILY MEDICINE

## 2023-11-02 RX ORDER — ALLOPURINOL 100 MG/1
100 TABLET ORAL DAILY
Qty: 90 TABLET | Refills: 1 | Status: SHIPPED | OUTPATIENT
Start: 2023-11-02

## 2023-11-02 RX ORDER — GLIMEPIRIDE 1 MG/1
TABLET ORAL
Qty: 90 TABLET | Refills: 1 | Status: SHIPPED | OUTPATIENT
Start: 2023-11-02

## 2023-11-02 RX ORDER — METFORMIN HYDROCHLORIDE 500 MG/1
TABLET, EXTENDED RELEASE ORAL
Qty: 90 TABLET | Refills: 1 | Status: SHIPPED | OUTPATIENT
Start: 2023-11-02

## 2023-11-02 NOTE — PROGRESS NOTES
motion. Right lower leg: No edema. Left lower leg: No edema. Right foot: Normal.      Left foot: Normal.   Neurological:      Mental Status: He is alert and oriented to person, place, and time. Sensory: Sensation is intact. Motor: Motor function is intact. Deep Tendon Reflexes: Reflexes are normal and symmetric. Comments: 12 point monofilament test normal    Psychiatric:         Behavior: Behavior is cooperative. Assessment:      Eva Chung was seen today for follow-up, hypertension and diabetes. Diagnoses and all orders for this visit:    Type 2 diabetes mellitus with microalbuminuria, without long-term current use of insulin (HCC)    Type 2 diabetes mellitus without complication, without long-term current use of insulin ([facility])  -     Microalbumin / Creatinine Urine Ratio; Future  -     Lipid Panel; Future  -     Hemoglobin A1C; Future  -     Comprehensive Metabolic Panel; Future    Severe obesity (BMI 35.0-39. 9) with comorbidity ([address])    Mild intermittent asthma without complication    Essential hypertension    Other orders  -     allopurinol (ZYLOPRIM) 100 MG tablet; Take 1 tablet by mouth daily  -     glimepiride (AMARYL) 1 MG tablet; TAKE 1 TABLET BY MOUTH EVERY DAY IN THE MORNING-  -     metFORMIN (GLUCOPHAGE-XR) 500 MG extended release tablet; TAKE 1 TABLET BY MOUTH EVERY DAY            Plan:      Reviewed labs and test results with patient. Diabetes mellitus has been extremely well controlled with the current medical combination recommend patient continue with the same. We discussed possibly changing to Januvia in the future but he would prefer to stay with the glimepiride medication because of cost.    He does not to be on cholesterol medication at this time.     Continue with the allopurinol for gout and kidney stone prevention    Continue with orthopedic care    Patient received counseling on the following healthy behaviors: nutrition and exercise     Patient

## 2023-11-16 ENCOUNTER — OFFICE VISIT (OUTPATIENT)
Dept: ORTHOPEDIC SURGERY | Age: 58
End: 2023-11-16

## 2023-11-16 VITALS — HEIGHT: 68 IN | BODY MASS INDEX: 39.4 KG/M2 | WEIGHT: 260 LBS

## 2023-11-16 DIAGNOSIS — S42.017A CLOSED NONDISPLACED FRACTURE OF STERNAL END OF RIGHT CLAVICLE, INITIAL ENCOUNTER: Primary | ICD-10-CM

## 2023-11-16 RX ORDER — TRAMADOL HYDROCHLORIDE 50 MG/1
50 TABLET ORAL EVERY 8 HOURS PRN
Qty: 12 TABLET | Refills: 0 | Status: SHIPPED | OUTPATIENT
Start: 2023-11-16 | End: 2023-11-21

## 2023-11-17 NOTE — PROGRESS NOTES
CHIEF COMPLAINT: Right shoulder pain/ minimally displaced clavicle fracture. DATE OF INJURY: 10/5/2023, DOT: 10/5/2023, Worker's Comp. HISTORY:  Mr. Sergio Aguillon is a 66 y.o.  male right handed who presents today for follow up evaluation of a right shoulder injury. The patient reports that this injury occurred when he was pulling on a machine really hard when it became loose and he heard a pop in his shoulder and then had difficulty moving it without pain. He was first seen and evaluated in [facility] ER, when he was x-rayed and splinted, and asked to f/u with Orthopedics. The patient denies any other injuries. Movement makes the pain worse and resting makes the pain better. No numbness or tingling sensation. He works as a  requiring heavy duty activities and has been back to work light duty. Denies smoking. Denies shortness of breath.     Past Medical History:   Diagnosis Date    Diabetes mellitus ([address])     Hypertension     Obesity, unspecified     MARIA A on CPAP     Other abnormal blood chemistry     Seasonal allergies     Unspecified sleep apnea        Past Surgical History:   Procedure Laterality Date    CATARACT REMOVAL WITH IMPLANT  2009, 2010    B eyes    CYSTOSCOPY Left 08/24/2021    CYSTOSCOPY, LEFT RETROGRADE PYELOGRAM, PLACEMENT OF LEFT URETERAL STENT performed by Kiya Salguero MD at [address] [place] [address] Left 09/02/2021    CYSTOSCOPY WITH LEFT URETEROSCOPY WITH HOLMIUM LASER LITHOTRIPSY, STONE MANIPULATION AND LEFT STENT REMOVAL performed by Kiya Salguero MD at [address] [place] [address] Left 04/09/2022    CYSTOSCOPY LEFT URETERAL STENT INSERTION performed by Kiya Salguero MD at [address] [place] [address] 06/25/2022    CYSTOSCOPY  LEFT URETERAL STENT INSERTION performed by Hodan Monk MD at [facility]  06/2022    3 surgeries ( last surgery was 8/2022)    TOOTH EXTRACTION  08/2018       Social History     Socioeconomic History

## 2023-11-22 ENCOUNTER — TELEPHONE (OUTPATIENT)
Dept: ORTHOPEDIC SURGERY | Age: 58
End: 2023-11-22

## 2023-11-22 DIAGNOSIS — S42.017A CLOSED NONDISPLACED FRACTURE OF STERNAL END OF RIGHT CLAVICLE, INITIAL ENCOUNTER: Primary | ICD-10-CM

## 2023-11-22 NOTE — TELEPHONE ENCOUNTER
Pt. Requesting order for PT. Order placed    11-  PLAN:  I discussed that the overall alignment of this fracture is good. He can be WBT, with no heavy impact activities. I discussed with the patient that I think that he would really benefit from a course of physical therapy for further strengthening and stretching. An Rx for physical therapy was given to the patient. We will see him  back in 2 months at which time we will get a new xray of the right clavicle.

## 2023-11-22 NOTE — TELEPHONE ENCOUNTER
General Question     Subject: REQUESTING A CALL REGARDING PT.  Patient: Sumanth Erwin  Contact Number: [phone]

## 2023-11-28 ENCOUNTER — TELEPHONE (OUTPATIENT)
Dept: ORTHOPEDIC SURGERY | Age: 58
End: 2023-11-28

## 2023-11-28 RX ORDER — GLIMEPIRIDE 2 MG/1
TABLET ORAL
Qty: 90 TABLET | Refills: 0 | OUTPATIENT
Start: 2023-11-28

## 2023-11-28 NOTE — TELEPHONE ENCOUNTER
S/w patient he stated that a prescription was supposed to be sent to [facility] but they have;t received it. He does not know the name of the medication but it was for pain.  Ph: [phone]

## 2023-11-28 NOTE — TELEPHONE ENCOUNTER
Spoke with patient. Verified that Tramadol was send to and received by the correct pharmacy on 11/16/23. Pt stated he would follow up with pharmacy again today.

## 2023-11-30 ENCOUNTER — HOSPITAL ENCOUNTER (OUTPATIENT)
Dept: PHYSICAL THERAPY | Age: 58
Setting detail: THERAPIES SERIES
Discharge: HOME | End: 2023-11-30
Attending: ORTHOPAEDIC SURGERY
Payer: COMMERCIAL

## 2023-11-30 DIAGNOSIS — R29.898 SHOULDER WEAKNESS: ICD-10-CM

## 2023-11-30 DIAGNOSIS — R68.89 DECREASED FUNCTIONAL ACTIVITY TOLERANCE: Primary | ICD-10-CM

## 2023-11-30 PROCEDURE — 97161 PT EVAL LOW COMPLEX 20 MIN: CPT

## 2023-11-30 PROCEDURE — 97530 THERAPEUTIC ACTIVITIES: CPT

## 2023-11-30 NOTE — FLOWSHEET NOTE
[address] [facility] and Therapy [facility], [address] [facility], [address] [place] [address] office: [phone] fax: [phone]      Physical Therapy: TREATMENT/PROGRESS NOTE   Patient: Mikael Yousif (3 y.o. male)   Treatment Date: 2023   :  1965 MRN: [id number]   Visit #: 1   Insurance Allowable Auth Needed   12 approved through 24 [x]Yes    []No  Workers comp    Insurance: Payor: [address] [employer] [employer] / Plan: [address] [employer] / Product Type: *No Product type* /   Insurance ID: [id number] - (Worker's Comp)  Secondary Insurance (if applicable):    Treatment Diagnosis:     ICD-10-CM    1. Decreased functional activity tolerance  R68.89       2.  Shoulder weakness  R29.898          Medical Diagnosis:    Closed nondisplaced fracture of sternal end of right clavicle, initial encounter [S42.017A]   Referring Physician: Ethan Hirsch MD  PCP: Yuniel Morton MD                             Plan of care signed (Y/N): sent for cosign    Date of Patient follow up with Physician:      Progress Report/POC: EVAL today  POC update due: (10 visits [address] [facility], whichever is less)  2023     Precautions/ Contra-indications:                                                                                          Latex allergy:  NO  Pacemaker:    NO  Contraindications for Manipulation: None  Date of Surgery: none  Other:     Preferred Language for Healthcare:   [x]English       []other:    SUBJECTIVE EXAMINATION     Patient Report/Comments: see eval     Test used Initial score  23   Pain Summary VAS 3/10    Functional questionnaire Quick DASH 24/30%    Other:                OBJECTIVE EXAMINATION     Observation: see eval    Test measurements: see eval    Exercises/Interventions:     Therapeutic Ex (12717)  resistance Sets/time Reps Notes/Cues/Progressions   UBE        D-shrugs              Mid rows  High rows  LPD                     RTC Pt calling back to schedule    Has called 3 time and has had no call back, please assist    Pt can be reached at [phone]

## 2023-12-04 ENCOUNTER — HOSPITAL ENCOUNTER (OUTPATIENT)
Dept: PHYSICAL THERAPY | Age: 58
Setting detail: THERAPIES SERIES
Discharge: HOME | End: 2023-12-04
Attending: ORTHOPAEDIC SURGERY
Payer: COMMERCIAL

## 2023-12-04 PROCEDURE — 97140 MANUAL THERAPY 1/> REGIONS: CPT

## 2023-12-04 PROCEDURE — 97530 THERAPEUTIC ACTIVITIES: CPT

## 2023-12-04 PROCEDURE — 97110 THERAPEUTIC EXERCISES: CPT

## 2023-12-04 NOTE — FLOWSHEET NOTE
[address] [facility] and Therapy [facility], [address] [facility], [address] [place] [address] office: [phone] fax: [phone]      Physical Therapy: TREATMENT/PROGRESS NOTE   Patient: Luis Alberto Herbert (73 y.o. male)   Treatment Date: 2023   :  1965 MRN: [id number]   Visit #: 2   Insurance Allowable Auth Needed   12 approved through 24 [x]Yes    []No  Workers comp    Insurance: Payor: [employer] / Plan: [address] [employer] / Product Type: *No Product type* /   Insurance ID: [id number] - (Worker's Comp)  Secondary Insurance (if applicable):    Treatment Diagnosis:   No diagnosis found. Medical Diagnosis:    Closed nondisplaced fracture of sternal end of right clavicle, initial encounter [S42.017A]   Referring Physician: Luis Eduardo Cooley MD  PCP: Sarah Tim MD                             Plan of care signed (Y/N): sent for cosign    Date of Patient follow up with Physician:      Progress Report/POC: EVAL today  POC update due: (10 visits [address] [facility], whichever is less)  1/3/2024     Precautions/ Contra-indications:                                                                                          Latex allergy:  NO  Pacemaker:    NO  Contraindications for Manipulation: None  Date of Surgery: none  Other:     Preferred Language for Healthcare:   [x]English       []other:    SUBJECTIVE EXAMINATION     Patient Report/Comments: States exercises are \"going\" at home. He's not doing them consistently. Aching and soreness this date reported.       Test used Initial score  23   Pain Summary VAS 3/10 3   Functional questionnaire Quick DASH 24/30%    Other:                OBJECTIVE EXAMINATION     Observation: see eval    Test measurements: see eval    Exercises/Interventions:     Therapeutic Ex (63381)  resistance Sets/time Reps Notes/Cues/Progressions   UBE   4'     D-shrugs              Mid rows  High rows  LPD

## 2023-12-07 ENCOUNTER — HOSPITAL ENCOUNTER (OUTPATIENT)
Dept: PHYSICAL THERAPY | Age: 58
Setting detail: THERAPIES SERIES
Discharge: HOME | End: 2023-12-07
Attending: ORTHOPAEDIC SURGERY
Payer: COMMERCIAL

## 2023-12-07 PROCEDURE — 97110 THERAPEUTIC EXERCISES: CPT

## 2023-12-07 PROCEDURE — 97140 MANUAL THERAPY 1/> REGIONS: CPT

## 2023-12-07 NOTE — FLOWSHEET NOTE
Status: [] Progressing: [] Met: [] Not Met: [] Adjusted  LLE AROM = RLE AROM to allow for proper joint functioning as indicated by patients functional deficits. Status: [] Progressing: [] Met: [] Not Met: [] Adjusted  Pt to improve strength to 4+/5 or better of rotator cuff to allow for proper muscle and joint use in functional mobility, ADLs and prior level of function   Status: [] Progressing: [] Met: [] Not Met: [] Adjusted  Patient will return to Reaching activities without increased symptoms or restriction to work towards return to prior level of function. Status: [] Progressing: [] Met: [] Not Met: [] Adjusted  Patient will increase UE function to return to work with regular duties. Status: [] Progressing: [] Met: [] Not Met: [] Adjusted  6. Patient will be able to manage symptoms while resuming full duty at work. Status: [] Progressing: [] Met: [] Not Met: [] Adjusted      Overall Progression Towards Functional goals/ Treatment Progress Update:  [] Patient is progressing as expected towards functional goals listed. [] Progression is slowed due to complexities/Impairments listed. [] Progression has been slowed due to co-morbidities. [x] Plan just implemented, too soon (<30days) to assess goals progression   [] Goals require adjustment due to lack of progress  [] Patient is not progressing as expected and requires additional follow up with physician  [] Other:     CHARGE CAPTURE     PT CHARGE GRID   CPT Code (TIMED) minutes # CPT Code (UNTIMED) #     Therex (95941)  25 2  EVAL:LOW (03353 - Typically 20 minutes face-to-face)     Neuromusc. Re-ed (34978)    Re-Eval (59510)     Manual (22794) 15 1  Estim Unattended (62988)     Ther. Act (77337)    [facility].  Traction ([id number])     Gait (38472)    Dry Needle 1-2 muscle (30676)     Aquatic Therex (28148)    Dry Needle

## 2023-12-08 NOTE — OP NOTE
Urology Operative Report  [facility]    Provider: Merna Rueda MD MD Patient ID:  Admission Date: 2021 Name: Saida Vazquez Date: 2021  MRN: [id number]   Patient Location: OR/NONE : 1965  Attending: Radha Combs MD Date of Service: 2021  PCP: Narda Mobley MD     Date of Operation: 2021    Preoperative Diagnosis: LEFT side mid ureteral stone    Postoperative Diagnosis: same    Procedure:    1. Cystoscopy  2. Left side ureteral stent placement  3. Fluoroscopic imaging < 1 hr physician time  4. LEFT side retrograde ureteropyelogram    Surgeon:   Merna Rueda MD, MD    Anesthesia: General LMA anesthesia    Indications: Jacky Beatty is a 49 y.o. male who presents for the above named surgery. Informed consent was obtained and the risks, benefits, and details of the procedure were explained to the patient who elected to proceed. Details of Procedure: The patient was brought to the operating room and placed in the supine position on the operating room table. SCDs were placed on the lower extremities. Following induction of anesthesia the patient was positioned in a lithotomy position, all pressure points were padded, and the genitals were prepped and draped in the usual sterile fashion. A routine timeout was performed, confirming the patient, procedure, site, risk of fire, patient allergies and confirming that preoperative antibiotics had been administered prior to beginning. A 21 fr rigid cystoscope was advance via a normal appearing urethra into the bladder. The bladder was inspected and there were no suspicious lesions, stones or diverticula seen. Attention was turned to the left ureteral orifice. A 0.035 sensor wire was advance into the left UO and up to the renal pelvis under control of fluoroscopy. There was resistance felt at the expected level of the stone based on the CT scan.  Over the wire a 5 fr open ended catheter was positioned Patient's belongings returned

## 2023-12-12 ENCOUNTER — HOSPITAL ENCOUNTER (OUTPATIENT)
Dept: PHYSICAL THERAPY | Age: 58
Setting detail: THERAPIES SERIES
Discharge: HOME | End: 2023-12-12
Attending: ORTHOPAEDIC SURGERY
Payer: COMMERCIAL

## 2023-12-12 PROCEDURE — 97140 MANUAL THERAPY 1/> REGIONS: CPT

## 2023-12-12 PROCEDURE — 97110 THERAPEUTIC EXERCISES: CPT

## 2023-12-12 NOTE — FLOWSHEET NOTE
[address] [facility] and Therapy [facility], [address] [facility], [address] [place] [address] office: [phone] fax: [phone]      Physical Therapy: TREATMENT/PROGRESS NOTE   Patient: Sarina Morataya (42 y.o. male)   Treatment Date: 2023   :  1965 MRN: [id number]   Visit #: 4   Insurance Allowable Auth Needed   12 approved through 24 [x]Yes    []No  Workers comp    Insurance: Payor: [address] [employer] [place] [employer] / Plan: [address] [employer] [place] [employer] / Product Type: *No Product type* /   Insurance ID: [id number] - (Worker's Comp)  Secondary Insurance (if applicable):    Treatment Diagnosis:       ICD-10-CM      1. Decreased functional activity tolerance  R68.89         2. Shoulder weakness  R29.898        Medical Diagnosis:    Closed nondisplaced fracture of sternal end of right clavicle, initial encounter [S42.017A]   Referring Physician: Raphael Mckeon MD  PCP: Susie Ly MD                             Plan of care signed (Y/N): yes    Date of Patient follow up with Physician:      Progress Report/POC: NO  POC update due: (10 visits [address] [facility], whichever is less)  2024     Precautions/ Contra-indications:                                                                                          Latex allergy:  NO  Pacemaker:    NO  Contraindications for Manipulation: None  Date of Surgery: none  Other:     Preferred Language for Healthcare:   [x]English       []other:    SUBJECTIVE EXAMINATION     Patient Report/Comments: Pt reports only  minimal pain this date; \"annoying\". States he feels therapy is helping some overall. States he has been trying to take it easy some at work. Test used Initial score  23   Pain Summary VAS 3/10 2.5/10   Functional questionnaire Quick DASH 24/30%    Other:                OBJECTIVE EXAMINATION     Observation: Pt with rounded shoulders and forward head.       Test measurements: Formal measurements not taken this

## 2023-12-14 ENCOUNTER — HOSPITAL ENCOUNTER (OUTPATIENT)
Dept: PHYSICAL THERAPY | Age: 58
Setting detail: THERAPIES SERIES
Discharge: HOME | End: 2023-12-14
Attending: ORTHOPAEDIC SURGERY
Payer: COMMERCIAL

## 2023-12-14 PROCEDURE — 97110 THERAPEUTIC EXERCISES: CPT

## 2023-12-14 PROCEDURE — 97140 MANUAL THERAPY 1/> REGIONS: CPT

## 2023-12-14 NOTE — FLOWSHEET NOTE
Estim Attended (88113)    Canalith Repositioning (47466)     Other:    Other: Total Timed Code Tx Minutes 45 3       Total Treatment Minutes 45        Charge Justification:  (12541) THERAPEUTIC EXERCISE - Provided verbal/tactile cueing for activities related to strengthening, flexibility, endurance, ROM performed to prevent loss of range of motion, maintain or improve muscular strength or increase flexibility, following either an injury or surgery. (72320) MANUAL THERAPY -  Manual therapy techniques, 1 or more regions, each 15 minutes (Mobilization/manipulation, manual lymphatic drainage, manual traction) for the purpose of modulating pain, promoting relaxation,  increasing ROM, reducing/eliminating soft tissue swelling/inflammation/restriction, improving soft tissue extensibility and allowing for proper ROM for normal function with self care, mobility, lifting and ambulation    TREATMENT PLAN   Plan: Cont POC- Continue emphasis/focus on exercise progression, improving proper muscle recruitment and activation/motor control patterns, and modulating pain. Next visit plan to progress weights, progress reps, and add new exercises     Electronically Signed by Emperatriz Zayas PT,DPT, OMT-C         Date: 12/14/2023     Note: If patient does not return for scheduled/recommended follow up visits, this note will serve as a discharge from care along with the most recent update on progress.

## 2023-12-27 ENCOUNTER — HOSPITAL ENCOUNTER (OUTPATIENT)
Dept: PHYSICAL THERAPY | Age: 58
Setting detail: THERAPIES SERIES
Discharge: HOME | End: 2023-12-27
Attending: ORTHOPAEDIC SURGERY
Payer: COMMERCIAL

## 2023-12-27 PROCEDURE — 97530 THERAPEUTIC ACTIVITIES: CPT

## 2023-12-27 PROCEDURE — 97140 MANUAL THERAPY 1/> REGIONS: CPT

## 2023-12-27 PROCEDURE — 97110 THERAPEUTIC EXERCISES: CPT

## 2023-12-27 NOTE — FLOWSHEET NOTE
[address] [facility] and Therapy [facility], [address] [facility], [address] [place] [address] office: [phone] fax: [phone]      Physical Therapy: TREATMENT/PROGRESS NOTE   Patient: Myla Pérez (89 y.o. male)   Treatment Date: 2023   :  1965 MRN: [id number]   Visit #: 8   Insurance Allowable Auth Needed   12 approved through 24 [x]Yes    []No  Workers comp    Insurance: Payor: [employer] / Plan: [address] [employer] / Product Type: *No Product type* /   Insurance ID: [id number] - (Worker's Comp)  Secondary Insurance (if applicable):    Treatment Diagnosis:       ICD-10-CM      1. Decreased functional activity tolerance  R68.89         2. Shoulder weakness  R29.898        Medical Diagnosis:    Closed nondisplaced fracture of sternal end of right clavicle, initial encounter [S42.017A]   Referring Physician: Millicent Eid MD  PCP: Jasper Rod MD                             Plan of care signed (Y/N): yes    Date of Patient follow up with Physician:      Progress Report/POC: NO  POC update due: (10 visits [address] [facility], whichever is less)  23    Precautions/ Contra-indications:                                                                                          Latex allergy:  NO  Pacemaker:    NO  Contraindications for Manipulation: None  Date of Surgery: none  Other:     Preferred Language for Healthcare:   [x]English       []other:    SUBJECTIVE EXAMINATION     Patient Report/Comments:  Pain in front of upper arm and \"a little bit in my shoulder\". \"I just didn't abuse it at work today, it was a lighter work day\". Sees MD for follow up on . Reports he feels like motion is a little bit better with slightly less pain since starting therapy.       Test used Initial score  23   Pain Summary VAS 3/10 3/10 R bicep and 2/10 R shoulder   Functional questionnaire Quick DASH 24/30%    Other:

## 2024-01-03 ENCOUNTER — HOSPITAL ENCOUNTER (OUTPATIENT)
Dept: PHYSICAL THERAPY | Age: 59
Setting detail: THERAPIES SERIES
Discharge: HOME | End: 2024-01-03
Attending: ORTHOPAEDIC SURGERY
Payer: COMMERCIAL

## 2024-01-03 PROCEDURE — 97530 THERAPEUTIC ACTIVITIES: CPT

## 2024-01-03 PROCEDURE — 97140 MANUAL THERAPY 1/> REGIONS: CPT

## 2024-01-03 PROCEDURE — 97110 THERAPEUTIC EXERCISES: CPT

## 2024-01-03 NOTE — FLOWSHEET NOTE
Not Met: [] Adjusted     Long Term Goals: To be achieved in: 4-6weeks  Disability index score of 5% or less for the Quick DASH to assist with return to prior level of function.                         Status: [] Progressing: [] Met: [] Not Met: [] Adjusted  LLE AROM = RLE AROM to allow for proper joint functioning as indicated by patients functional deficits.  Status: [] Progressing: [] Met: [] Not Met: [] Adjusted  Pt to improve strength to 4+/5 or better of rotator cuff to allow for proper muscle and joint use in functional mobility, ADLs and prior level of function   Status: [] Progressing: [] Met: [] Not Met: [] Adjusted  Patient will return to Reaching activities without increased symptoms or restriction to work towards return to prior level of function.                               Status: [] Progressing: [] Met: [] Not Met: [] Adjusted  Patient will increase UE function to return to work with regular duties.   Status: [] Progressing: [] Met: [] Not Met: [] Adjusted  6.    Patient will be able to manage symptoms while resuming full duty at work.                                                                                                                    Status: [] Progressing: [] Met: [] Not Met: [] Adjusted      Overall Progression Towards Functional goals/ Treatment Progress Update:  [] Patient is progressing as expected towards functional goals listed.    [] Progression is slowed due to complexities/Impairments listed.  [] Progression has been slowed due to co-morbidities.  [x] Plan just implemented, too soon (<30days) to assess goals progression   [] Goals require adjustment due to lack of progress  [] Patient is not progressing as expected and requires additional follow up with physician  [] Other:     CHARGE CAPTURE     PT CHARGE GRID   CPT Code (TIMED) minutes # CPT Code (UNTIMED) #     Therex (17641)  20 2  EVAL:LOW (33867 - Typically 20 minutes face-to-face)     Neuromusc. Re-ed (92708)

## 2024-01-05 ENCOUNTER — HOSPITAL ENCOUNTER (OUTPATIENT)
Dept: PHYSICAL THERAPY | Age: 59
Setting detail: THERAPIES SERIES
Discharge: HOME | End: 2024-01-05
Attending: ORTHOPAEDIC SURGERY
Payer: COMMERCIAL

## 2024-01-05 PROCEDURE — 97110 THERAPEUTIC EXERCISES: CPT

## 2024-01-05 PROCEDURE — 97140 MANUAL THERAPY 1/> REGIONS: CPT

## 2024-01-05 NOTE — FLOWSHEET NOTE
[facility] - Outpatient Rehabilitation and Therapy [address], [place] [address] office: [phone] fax: [phone]      Physical Therapy: TREATMENT/PROGRESS NOTE   Patient: Toño Grewal (58 y.o. male)   Treatment Date: 2024   :  1965 MRN: [id number]   Visit #: 10   Insurance Allowable Auth Needed   12 approved through 24 [x]Yes    []No  Workers comp    Insurance: Payor: [employer] / Plan: [employer] / Product Type: *No Product type* /   Insurance ID: [id number] - (Worker's Comp)  Secondary Insurance (if applicable):    Treatment Diagnosis:       ICD-10-CM      1. Decreased functional activity tolerance  R68.89         2. Shoulder weakness  R29.898        Medical Diagnosis:    Closed nondisplaced fracture of sternal end of right clavicle, initial encounter [S42.017A]   Referring Physician: Meryl Pepe MD  PCP: Candido Colón MD                             Plan of care signed (Y/N): yes    Date of Patient follow up with Physician:      Progress Report/POC: NO  POC update due: (10 visits /OR AUTH LIMITS, whichever is less)  23    Precautions/ Contra-indications:                                                                                          Latex allergy:  NO  Pacemaker:    NO  Contraindications for Manipulation: None  Date of Surgery: none  Other:     Preferred Language for Healthcare:   [x]English       []other:    SUBJECTIVE EXAMINATION     Patient Report/Comments:  Hurting today.      Test used Initial score  23   Pain Summary VAS 3/10 3/10 R bicep and 2/10 R shoulder   Functional questionnaire Quick DASH 24/30%    Other:                OBJECTIVE EXAMINATION     Observation: 23:  Pt with rounded shoulders and forward head.      Test measurements: 23:  Formal measurements not taken this date.     Exercises/Interventions:     Therapeutic Ex (76913)  resistance Sets/time Reps

## 2024-01-09 ENCOUNTER — HOSPITAL ENCOUNTER (OUTPATIENT)
Dept: PHYSICAL THERAPY | Age: 59
Setting detail: THERAPIES SERIES
Discharge: HOME | End: 2024-01-09
Attending: ORTHOPAEDIC SURGERY
Payer: COMMERCIAL

## 2024-01-09 PROCEDURE — 97140 MANUAL THERAPY 1/> REGIONS: CPT

## 2024-01-09 PROCEDURE — 97110 THERAPEUTIC EXERCISES: CPT

## 2024-01-11 ENCOUNTER — HOSPITAL ENCOUNTER (OUTPATIENT)
Dept: PHYSICAL THERAPY | Age: 59
Setting detail: THERAPIES SERIES
Discharge: HOME | End: 2024-01-11
Attending: ORTHOPAEDIC SURGERY
Payer: COMMERCIAL

## 2024-01-11 PROCEDURE — 97140 MANUAL THERAPY 1/> REGIONS: CPT

## 2024-01-11 PROCEDURE — 97530 THERAPEUTIC ACTIVITIES: CPT

## 2024-01-11 PROCEDURE — 97110 THERAPEUTIC EXERCISES: CPT

## 2024-01-12 NOTE — FLOWSHEET NOTE
[facility] - Outpatient Rehabilitation and Therapy [address], [place] [address] office: [phone] fax: [phone]  Physical Therapy Re-Certification Plan of Care    Dear Meryl Pepe MD  ,    We had the pleasure of treating the following patient for physical therapy services at [facility]. A summary of our findings can be found in the updated assessment below.  This includes our plan of care.  If you have any questions or concerns regarding these findings, please do not hesitate to contact me at the office phone number checked above.  Thank you for the referral.     Physician Signature:________________________________Date:__________________  By signing above (or electronic signature), therapist's plan is approved by physician      Functional Outcome: Quick dash:  raw score:  22/25%    Toño Grewal 1965 has progressed well throughout course of PT.  ROM and strength are both improved.  Pt still complains of pain in both R shoulder and B elbows.  States he doesn't feel he is where he needs to be to complete all job duties and would like to continue with PT.  Reports 50% improvement overall since initiation of PT in regards to shoulder.  States elbow pain has persisted.     Overall Response to Treatment:   [x]Patient is responding well to treatment and improvement is noted with regards to goals   []Patient should continue to improve in reasonable time if they continue HEP   []Patient has plateaued and is no longer responding to skilled PT intervention    []Patient is getting worse and would benefit from return to referring MD   []Patient unable to adhere to initial POC   []Other:     Total Visits: 12     Recommendation:    [x] Continue PT 1-2x / wk for 4 weeks if C9 approval rec'd.   [] Hold PT, pending MD visit   [] Discharge to HEP. Follow up with PT or MD PRN.        Physical Therapy: TREATMENT/PROGRESS NOTE   Patient: Toño Grewal (58 y.o.

## 2024-01-16 ENCOUNTER — OFFICE VISIT (OUTPATIENT)
Dept: ORTHOPEDIC SURGERY | Age: 59
End: 2024-01-16
Payer: COMMERCIAL

## 2024-01-16 VITALS — WEIGHT: 260 LBS | HEIGHT: 68 IN | BODY MASS INDEX: 39.4 KG/M2

## 2024-01-16 DIAGNOSIS — S42.017A CLOSED NONDISPLACED FRACTURE OF STERNAL END OF RIGHT CLAVICLE, INITIAL ENCOUNTER: Primary | ICD-10-CM

## 2024-01-16 PROCEDURE — 99213 OFFICE O/P EST LOW 20 MIN: CPT | Performed by: ORTHOPAEDIC SURGERY

## 2024-01-16 NOTE — PROGRESS NOTES
CHIEF COMPLAINT: Right shoulder pain/ minimally displaced clavicle fracture.    DATE OF INJURY: 10/5/2023, DOT: 10/5/2023, Worker's Comp.    HISTORY:  Mr. Grewal is a 58 y.o.  male right handed who presents today for follow up evaluation of a right shoulder injury.  The patient reports that this injury occurred when he was pulling on a machine really hard when it became loose and he heard a pop in his shoulder and then had difficulty moving it without pain.  He was first seen and evaluated in [facility] ER, when he was x-rayed and splinted, and asked to f/u with Orthopedics. The patient denies any other injuries. He rates his pain a 6/10 VAS and is worse with certain movements out extended or overhead. LResting makes the pain better. No numbness or tingling sensation.  He works as a  requiring heavy duty activities.  Denies smoking.    Past Medical History:   Diagnosis Date    Diabetes mellitus (HCC)     Hypertension     Obesity, unspecified     MARIA A on CPAP     Other abnormal blood chemistry     Seasonal allergies     Unspecified sleep apnea        Past Surgical History:   Procedure Laterality Date    CATARACT REMOVAL WITH IMPLANT  2009, 2010    B eyes    CYSTOSCOPY Left 08/24/2021    CYSTOSCOPY, LEFT RETROGRADE PYELOGRAM, PLACEMENT OF LEFT URETERAL STENT performed by Blaise Ritchie MD at [facility] OR    CYSTOSCOPY Left 09/02/2021    CYSTOSCOPY WITH LEFT URETEROSCOPY WITH HOLMIUM LASER LITHOTRIPSY, STONE MANIPULATION AND LEFT STENT REMOVAL performed by Blaise Ritchie MD at [facility] OR    CYSTOSCOPY Left 04/09/2022    CYSTOSCOPY LEFT URETERAL STENT INSERTION performed by Blaise Ritchie MD at [facility] OR    CYSTOSCOPY Left 06/25/2022    CYSTOSCOPY  LEFT URETERAL STENT INSERTION performed by Red Estrada MD at [facility] OR    KIDNEY STONE SURGERY  06/2022    3 surgeries ( last surgery was 8/2022)    TOOTH EXTRACTION  08/2018       Social History     Socioeconomic History

## 2024-01-17 ENCOUNTER — APPOINTMENT (OUTPATIENT)
Dept: PHYSICAL THERAPY | Age: 59
End: 2024-01-17
Attending: ORTHOPAEDIC SURGERY
Payer: COMMERCIAL

## 2024-01-17 ENCOUNTER — TELEPHONE (OUTPATIENT)
Dept: ORTHOPEDIC SURGERY | Age: 59
End: 2024-01-17

## 2024-01-17 NOTE — TELEPHONE ENCOUNTER
[employer] / [employer]:    Caller:[facility]    Phone#:    Fax#:[phone]    [employer] Date of Service:  01.16.2024    REASON FOR CALL:         [employer] Form Missing

## 2024-01-18 ENCOUNTER — HOSPITAL ENCOUNTER (OUTPATIENT)
Dept: PHYSICAL THERAPY | Age: 59
Setting detail: THERAPIES SERIES
Discharge: HOME | End: 2024-01-18
Attending: ORTHOPAEDIC SURGERY
Payer: COMMERCIAL

## 2024-01-18 NOTE — FLOWSHEET NOTE
Physical Therapy    Pt scheduled appt today prior to receiving new C9 approval.  Not yet rec'd so will reschedule visit one approval rec'd.      Sherry Neal, PT, DPT, OMT-C

## 2024-01-26 ENCOUNTER — HOSPITAL ENCOUNTER (OUTPATIENT)
Age: 59
Discharge: HOME | End: 2024-01-26
Payer: COMMERCIAL

## 2024-01-26 DIAGNOSIS — E11.22 TYPE 2 DM WITH CKD STAGE 2 AND HYPERTENSION (HCC): ICD-10-CM

## 2024-01-26 DIAGNOSIS — I10 ESSENTIAL HYPERTENSION: ICD-10-CM

## 2024-01-26 DIAGNOSIS — N20.0 CALCULUS OF KIDNEY: ICD-10-CM

## 2024-01-26 DIAGNOSIS — N18.2 TYPE 2 DM WITH CKD STAGE 2 AND HYPERTENSION (HCC): ICD-10-CM

## 2024-01-26 DIAGNOSIS — E66.9 OBESITY (BMI 30-39.9): ICD-10-CM

## 2024-01-26 DIAGNOSIS — I12.9 TYPE 2 DM WITH CKD STAGE 2 AND HYPERTENSION (HCC): ICD-10-CM

## 2024-01-26 LAB
ALBUMIN SERPL-MCNC: 4.3 G/DL (ref 3.4–5)
ANION GAP SERPL CALCULATED.3IONS-SCNC: 5 MMOL/L (ref 3–16)
BACTERIA URNS QL MICRO: ABNORMAL /HPF
BILIRUB UR QL STRIP.AUTO: NEGATIVE
BUN SERPL-MCNC: 24 MG/DL (ref 7–20)
CALCIUM SERPL-MCNC: 8.8 MG/DL (ref 8.3–10.6)
CHLORIDE SERPL-SCNC: 105 MMOL/L (ref 99–110)
CLARITY UR: CLEAR
CO2 SERPL-SCNC: 31 MMOL/L (ref 21–32)
COLOR UR: YELLOW
CREAT SERPL-MCNC: 1.2 MG/DL (ref 0.9–1.3)
CREAT UR-MCNC: 107.4 MG/DL (ref 39–259)
DEPRECATED RDW RBC AUTO: 14.1 % (ref 12.4–15.4)
EPI CELLS #/AREA URNS AUTO: 1 /HPF (ref 0–5)
GFR SERPLBLD CREATININE-BSD FMLA CKD-EPI: >60 ML/MIN/{1.73_M2}
GLUCOSE SERPL-MCNC: 191 MG/DL (ref 70–99)
GLUCOSE UR STRIP.AUTO-MCNC: NEGATIVE MG/DL
HCT VFR BLD AUTO: 46 % (ref 40.5–52.5)
HGB BLD-MCNC: 16.1 G/DL (ref 13.5–17.5)
HGB UR QL STRIP.AUTO: NEGATIVE
HYALINE CASTS #/AREA URNS AUTO: 1 /LPF (ref 0–8)
KETONES UR STRIP.AUTO-MCNC: NEGATIVE MG/DL
LEUKOCYTE ESTERASE UR QL STRIP.AUTO: NEGATIVE
MCH RBC QN AUTO: 33 PG (ref 26–34)
MCHC RBC AUTO-ENTMCNC: 35.1 G/DL (ref 31–36)
MCV RBC AUTO: 94 FL (ref 80–100)
NITRITE UR QL STRIP.AUTO: NEGATIVE
PH UR STRIP.AUTO: 5 [PH] (ref 5–8)
PHOSPHATE SERPL-MCNC: 2.9 MG/DL (ref 2.5–4.9)
PLATELET # BLD AUTO: 191 K/UL (ref 135–450)
PMV BLD AUTO: 9.4 FL (ref 5–10.5)
POTASSIUM SERPL-SCNC: 5 MMOL/L (ref 3.5–5.1)
PROT UR STRIP.AUTO-MCNC: 30 MG/DL
PROT UR-MCNC: 40 MG/DL
PROT/CREAT UR-RTO: 0.4 MG/DL
RBC # BLD AUTO: 4.89 M/UL (ref 4.2–5.9)
RBC CLUMPS #/AREA URNS AUTO: 0 /HPF (ref 0–4)
SODIUM SERPL-SCNC: 141 MMOL/L (ref 136–145)
SP GR UR STRIP.AUTO: 1.02 (ref 1–1.03)
UA DIPSTICK W REFLEX MICRO PNL UR: YES
URN SPEC COLLECT METH UR: ABNORMAL
UROBILINOGEN UR STRIP-ACNC: 0.2 E.U./DL
WBC # BLD AUTO: 6.9 K/UL (ref 4–11)
WBC #/AREA URNS AUTO: 1 /HPF (ref 0–5)

## 2024-01-26 PROCEDURE — 80069 RENAL FUNCTION PANEL: CPT

## 2024-01-26 PROCEDURE — 85027 COMPLETE CBC AUTOMATED: CPT

## 2024-01-26 PROCEDURE — 82570 ASSAY OF URINE CREATININE: CPT

## 2024-01-26 PROCEDURE — 81001 URINALYSIS AUTO W/SCOPE: CPT

## 2024-01-26 PROCEDURE — 36415 COLL VENOUS BLD VENIPUNCTURE: CPT

## 2024-01-26 PROCEDURE — 84156 ASSAY OF PROTEIN URINE: CPT

## 2024-01-31 ENCOUNTER — TELEPHONE (OUTPATIENT)
Dept: ORTHOPEDIC SURGERY | Age: 59
End: 2024-01-31

## 2024-02-05 ENCOUNTER — HOSPITAL ENCOUNTER (OUTPATIENT)
Dept: ULTRASOUND IMAGING | Age: 59
Discharge: HOME | End: 2024-02-05
Payer: COMMERCIAL

## 2024-02-05 DIAGNOSIS — E11.22 TYPE 2 DM WITH CKD STAGE 2 AND HYPERTENSION (HCC): ICD-10-CM

## 2024-02-05 DIAGNOSIS — N18.2 CKD (CHRONIC KIDNEY DISEASE), STAGE II: ICD-10-CM

## 2024-02-05 DIAGNOSIS — I12.9 TYPE 2 DM WITH CKD STAGE 2 AND HYPERTENSION (HCC): ICD-10-CM

## 2024-02-05 DIAGNOSIS — N18.2 TYPE 2 DM WITH CKD STAGE 2 AND HYPERTENSION (HCC): ICD-10-CM

## 2024-02-05 PROCEDURE — 76770 US EXAM ABDO BACK WALL COMP: CPT

## 2024-02-10 ENCOUNTER — APPOINTMENT (OUTPATIENT)
Dept: CT IMAGING | Age: 59
End: 2024-02-10
Payer: COMMERCIAL

## 2024-02-10 ENCOUNTER — HOSPITAL ENCOUNTER (EMERGENCY)
Age: 59
Discharge: HOME | End: 2024-02-10
Payer: COMMERCIAL

## 2024-02-10 VITALS
TEMPERATURE: 97.2 F | BODY MASS INDEX: 37.13 KG/M2 | SYSTOLIC BLOOD PRESSURE: 172 MMHG | WEIGHT: 245 LBS | HEIGHT: 68 IN | OXYGEN SATURATION: 99 % | HEART RATE: 96 BPM | RESPIRATION RATE: 18 BRPM | DIASTOLIC BLOOD PRESSURE: 94 MMHG

## 2024-02-10 DIAGNOSIS — E86.0 DEHYDRATION: ICD-10-CM

## 2024-02-10 DIAGNOSIS — N20.0 KIDNEY STONE: Primary | ICD-10-CM

## 2024-02-10 DIAGNOSIS — R10.9 RIGHT FLANK PAIN: ICD-10-CM

## 2024-02-10 LAB
ALBUMIN SERPL-MCNC: 4 G/DL (ref 3.4–5)
ALBUMIN/GLOB SERPL: 1.3 {RATIO} (ref 1.1–2.2)
ALP SERPL-CCNC: 72 U/L (ref 40–129)
ALT SERPL-CCNC: 32 U/L (ref 10–40)
ANION GAP SERPL CALCULATED.3IONS-SCNC: 10 MMOL/L (ref 3–16)
AST SERPL-CCNC: 28 U/L (ref 15–37)
BACTERIA URNS QL MICRO: ABNORMAL /HPF
BASOPHILS # BLD: 0.1 K/UL (ref 0–0.2)
BASOPHILS NFR BLD: 0.6 %
BILIRUB SERPL-MCNC: 0.7 MG/DL (ref 0–1)
BILIRUB UR QL STRIP.AUTO: NEGATIVE
BUN SERPL-MCNC: 21 MG/DL (ref 7–20)
CALCIUM SERPL-MCNC: 9.2 MG/DL (ref 8.3–10.6)
CHLORIDE SERPL-SCNC: 102 MMOL/L (ref 99–110)
CLARITY UR: CLEAR
CO2 SERPL-SCNC: 25 MMOL/L (ref 21–32)
COLOR UR: ABNORMAL
CREAT SERPL-MCNC: 1.6 MG/DL (ref 0.9–1.3)
DEPRECATED RDW RBC AUTO: 13.9 % (ref 12.4–15.4)
EOSINOPHIL # BLD: 0.1 K/UL (ref 0–0.6)
EOSINOPHIL NFR BLD: 0.7 %
EPI CELLS #/AREA URNS AUTO: 3 /HPF (ref 0–5)
GFR SERPLBLD CREATININE-BSD FMLA CKD-EPI: 50 ML/MIN/{1.73_M2}
GLUCOSE SERPL-MCNC: 277 MG/DL (ref 70–99)
GLUCOSE UR STRIP.AUTO-MCNC: 250 MG/DL
HCT VFR BLD AUTO: 46 % (ref 40.5–52.5)
HGB BLD-MCNC: 16.1 G/DL (ref 13.5–17.5)
HGB UR QL STRIP.AUTO: ABNORMAL
HYALINE CASTS #/AREA URNS AUTO: 4 /LPF (ref 0–8)
KETONES UR STRIP.AUTO-MCNC: ABNORMAL MG/DL
LEUKOCYTE ESTERASE UR QL STRIP.AUTO: NEGATIVE
LIPASE SERPL-CCNC: 40 U/L (ref 13–60)
LYMPHOCYTES # BLD: 1.2 K/UL (ref 1–5.1)
LYMPHOCYTES NFR BLD: 13.5 %
MCH RBC QN AUTO: 33.2 PG (ref 26–34)
MCHC RBC AUTO-ENTMCNC: 35.1 G/DL (ref 31–36)
MCV RBC AUTO: 94.5 FL (ref 80–100)
MONOCYTES # BLD: 0.5 K/UL (ref 0–1.3)
MONOCYTES NFR BLD: 5.1 %
NEUTROPHILS # BLD: 7.1 K/UL (ref 1.7–7.7)
NEUTROPHILS NFR BLD: 80.1 %
NITRITE UR QL STRIP.AUTO: NEGATIVE
PH UR STRIP.AUTO: 5 [PH] (ref 5–8)
PLATELET # BLD AUTO: 209 K/UL (ref 135–450)
PMV BLD AUTO: 8.2 FL (ref 5–10.5)
POTASSIUM SERPL-SCNC: 5 MMOL/L (ref 3.5–5.1)
PROT SERPL-MCNC: 7.2 G/DL (ref 6.4–8.2)
PROT UR STRIP.AUTO-MCNC: 300 MG/DL
RBC # BLD AUTO: 4.87 M/UL (ref 4.2–5.9)
RBC CLUMPS #/AREA URNS AUTO: 22 /HPF (ref 0–4)
SODIUM SERPL-SCNC: 137 MMOL/L (ref 136–145)
SP GR UR STRIP.AUTO: >=1.03 (ref 1–1.03)
UA COMPLETE W REFLEX CULTURE PNL UR: ABNORMAL
UA DIPSTICK W REFLEX MICRO PNL UR: YES
URN SPEC COLLECT METH UR: ABNORMAL
UROBILINOGEN UR STRIP-ACNC: 1 E.U./DL
WBC # BLD AUTO: 8.8 K/UL (ref 4–11)
WBC #/AREA URNS AUTO: 1 /HPF (ref 0–5)

## 2024-02-10 PROCEDURE — 99284 EMERGENCY DEPT VISIT MOD MDM: CPT

## 2024-02-10 PROCEDURE — 80053 COMPREHEN METABOLIC PANEL: CPT

## 2024-02-10 PROCEDURE — 6360000002 HC RX W HCPCS: Performed by: PHYSICIAN ASSISTANT

## 2024-02-10 PROCEDURE — 96375 TX/PRO/DX INJ NEW DRUG ADDON: CPT

## 2024-02-10 PROCEDURE — 85025 COMPLETE CBC W/AUTO DIFF WBC: CPT

## 2024-02-10 PROCEDURE — 2580000003 HC RX 258: Performed by: PHYSICIAN ASSISTANT

## 2024-02-10 PROCEDURE — 6370000000 HC RX 637 (ALT 250 FOR IP): Performed by: PHYSICIAN ASSISTANT

## 2024-02-10 PROCEDURE — 74176 CT ABD & PELVIS W/O CONTRAST: CPT

## 2024-02-10 PROCEDURE — 36415 COLL VENOUS BLD VENIPUNCTURE: CPT

## 2024-02-10 PROCEDURE — 83690 ASSAY OF LIPASE: CPT

## 2024-02-10 PROCEDURE — 96361 HYDRATE IV INFUSION ADD-ON: CPT

## 2024-02-10 PROCEDURE — 96374 THER/PROPH/DIAG INJ IV PUSH: CPT

## 2024-02-10 PROCEDURE — 81001 URINALYSIS AUTO W/SCOPE: CPT

## 2024-02-10 RX ORDER — KETOROLAC TROMETHAMINE 30 MG/ML
30 INJECTION, SOLUTION INTRAMUSCULAR; INTRAVENOUS ONCE
Status: COMPLETED | OUTPATIENT
Start: 2024-02-10 | End: 2024-02-10

## 2024-02-10 RX ORDER — TAMSULOSIN HYDROCHLORIDE 0.4 MG/1
0.4 CAPSULE ORAL DAILY
Qty: 5 CAPSULE | Refills: 0 | Status: SHIPPED | OUTPATIENT
Start: 2024-02-10 | End: 2024-02-11

## 2024-02-10 RX ORDER — OXYCODONE HYDROCHLORIDE AND ACETAMINOPHEN 5; 325 MG/1; MG/1
2 TABLET ORAL ONCE
Status: COMPLETED | OUTPATIENT
Start: 2024-02-10 | End: 2024-02-10

## 2024-02-10 RX ORDER — OXYCODONE HYDROCHLORIDE AND ACETAMINOPHEN 5; 325 MG/1; MG/1
1 TABLET ORAL EVERY 6 HOURS PRN
Qty: 12 TABLET | Refills: 0 | Status: SHIPPED | OUTPATIENT
Start: 2024-02-10 | End: 2024-02-13

## 2024-02-10 RX ORDER — ORPHENADRINE CITRATE 30 MG/ML
30 INJECTION INTRAMUSCULAR; INTRAVENOUS ONCE
Status: COMPLETED | OUTPATIENT
Start: 2024-02-10 | End: 2024-02-10

## 2024-02-10 RX ORDER — 0.9 % SODIUM CHLORIDE 0.9 %
1000 INTRAVENOUS SOLUTION INTRAVENOUS ONCE
Status: COMPLETED | OUTPATIENT
Start: 2024-02-10 | End: 2024-02-10

## 2024-02-10 RX ORDER — ONDANSETRON 2 MG/ML
4 INJECTION INTRAMUSCULAR; INTRAVENOUS EVERY 6 HOURS PRN
Status: DISCONTINUED | OUTPATIENT
Start: 2024-02-10 | End: 2024-02-11 | Stop reason: HOSPADM

## 2024-02-10 RX ORDER — ONDANSETRON 4 MG/1
4 TABLET, FILM COATED ORAL EVERY 8 HOURS PRN
Qty: 20 TABLET | Refills: 0 | Status: SHIPPED | OUTPATIENT
Start: 2024-02-10 | End: 2024-02-11

## 2024-02-10 RX ORDER — TAMSULOSIN HYDROCHLORIDE 0.4 MG/1
0.4 CAPSULE ORAL ONCE
Status: COMPLETED | OUTPATIENT
Start: 2024-02-10 | End: 2024-02-10

## 2024-02-10 RX ADMIN — TAMSULOSIN HYDROCHLORIDE 0.4 MG: 0.4 CAPSULE ORAL at 23:08

## 2024-02-10 RX ADMIN — ONDANSETRON 4 MG: 2 INJECTION INTRAMUSCULAR; INTRAVENOUS at 19:17

## 2024-02-10 RX ADMIN — KETOROLAC TROMETHAMINE 30 MG: 30 INJECTION INTRAMUSCULAR; INTRAVENOUS at 19:18

## 2024-02-10 RX ADMIN — ORPHENADRINE CITRATE 30 MG: 60 INJECTION INTRAMUSCULAR; INTRAVENOUS at 19:18

## 2024-02-10 RX ADMIN — SODIUM CHLORIDE 1000 ML: 9 INJECTION, SOLUTION INTRAVENOUS at 21:37

## 2024-02-10 RX ADMIN — OXYCODONE HYDROCHLORIDE AND ACETAMINOPHEN 2 TABLET: 5; 325 TABLET ORAL at 23:08

## 2024-02-10 ASSESSMENT — PAIN DESCRIPTION - LOCATION: LOCATION: ABDOMEN

## 2024-02-10 ASSESSMENT — ENCOUNTER SYMPTOMS
SHORTNESS OF BREATH: 0
COUGH: 0
NAUSEA: 1
DIARRHEA: 0
ABDOMINAL PAIN: 1
RHINORRHEA: 0
VOMITING: 1

## 2024-02-10 ASSESSMENT — PAIN - FUNCTIONAL ASSESSMENT: PAIN_FUNCTIONAL_ASSESSMENT: 0-10

## 2024-02-10 ASSESSMENT — PAIN DESCRIPTION - DESCRIPTORS: DESCRIPTORS: ACHING

## 2024-02-10 ASSESSMENT — PAIN SCALES - GENERAL: PAINLEVEL_OUTOF10: 7

## 2024-02-11 DIAGNOSIS — N20.0 NEPHROLITHIASIS: Primary | ICD-10-CM

## 2024-02-11 RX ORDER — ONDANSETRON 4 MG/1
4 TABLET, FILM COATED ORAL EVERY 8 HOURS PRN
Qty: 20 TABLET | Refills: 0 | Status: SHIPPED | OUTPATIENT
Start: 2024-02-11

## 2024-02-11 RX ORDER — OXYCODONE HYDROCHLORIDE AND ACETAMINOPHEN 5; 325 MG/1; MG/1
1 TABLET ORAL EVERY 4 HOURS PRN
Qty: 30 TABLET | Refills: 0 | Status: SHIPPED | OUTPATIENT
Start: 2024-02-11 | End: 2024-02-16

## 2024-02-11 RX ORDER — TAMSULOSIN HYDROCHLORIDE 0.4 MG/1
0.4 CAPSULE ORAL DAILY
Qty: 30 CAPSULE | Refills: 0 | Status: SHIPPED | OUTPATIENT
Start: 2024-02-11

## 2024-02-11 RX ORDER — TAMSULOSIN HYDROCHLORIDE 0.4 MG/1
0.4 CAPSULE ORAL DAILY
Qty: 5 CAPSULE | Refills: 0 | Status: SHIPPED | OUTPATIENT
Start: 2024-02-11 | End: 2024-02-16

## 2024-02-11 NOTE — ED PROVIDER NOTES
tablet, R-0Normal      ondansetron (ZOFRAN) 4 MG tablet Take 1 tablet by mouth every 8 hours as needed for Nausea, Disp-20 tablet, R-0Normal      tamsulosin (FLOMAX) 0.4 MG capsule Take 1 capsule by mouth daily for 5 doses, Disp-5 capsule, R-0Normal             DISCONTINUED MEDICATIONS:  Discharge Medication List as of 2/10/2024 10:50 PM                 (Please note that portions of this note were completed with a voice recognition program.  Efforts were made to edit the dictations but occasionally words are mis-transcribed.)    Sera Rocha PA-C (electronically signed)        Sera Rocha PA-C  02/11/24 0034

## 2024-02-14 ENCOUNTER — APPOINTMENT (OUTPATIENT)
Dept: CT IMAGING | Age: 59
End: 2024-02-14
Payer: COMMERCIAL

## 2024-02-14 ENCOUNTER — HOSPITAL ENCOUNTER (OUTPATIENT)
Age: 59
Setting detail: OBSERVATION
Discharge: HOME | End: 2024-02-15
Attending: EMERGENCY MEDICINE | Admitting: STUDENT IN AN ORGANIZED HEALTH CARE EDUCATION/TRAINING PROGRAM
Payer: COMMERCIAL

## 2024-02-14 DIAGNOSIS — Z78.9 FAILURE OF OUTPATIENT TREATMENT: ICD-10-CM

## 2024-02-14 DIAGNOSIS — N23 RENAL COLIC: ICD-10-CM

## 2024-02-14 DIAGNOSIS — N28.9 MILD RENAL INSUFFICIENCY: ICD-10-CM

## 2024-02-14 DIAGNOSIS — N20.0 KIDNEY STONE ON RIGHT SIDE: Primary | ICD-10-CM

## 2024-02-14 LAB
ALBUMIN SERPL-MCNC: 4 G/DL (ref 3.4–5)
ALBUMIN/GLOB SERPL: 1.1 {RATIO} (ref 1.1–2.2)
ALP SERPL-CCNC: 72 U/L (ref 40–129)
ALT SERPL-CCNC: 25 U/L (ref 10–40)
ANION GAP SERPL CALCULATED.3IONS-SCNC: 13 MMOL/L (ref 3–16)
AST SERPL-CCNC: 22 U/L (ref 15–37)
BACTERIA URNS QL MICRO: NORMAL /HPF
BASOPHILS # BLD: 0 K/UL (ref 0–0.2)
BASOPHILS NFR BLD: 0.5 %
BILIRUB SERPL-MCNC: 0.8 MG/DL (ref 0–1)
BILIRUB UR QL STRIP.AUTO: NEGATIVE
BUN SERPL-MCNC: 28 MG/DL (ref 7–20)
CALCIUM SERPL-MCNC: 9.1 MG/DL (ref 8.3–10.6)
CHLORIDE SERPL-SCNC: 99 MMOL/L (ref 99–110)
CLARITY UR: CLEAR
COLOR UR: YELLOW
CREAT SERPL-MCNC: 2 MG/DL (ref 0.9–1.3)
DEPRECATED RDW RBC AUTO: 14.3 % (ref 12.4–15.4)
EOSINOPHIL # BLD: 0.1 K/UL (ref 0–0.6)
EOSINOPHIL NFR BLD: 1.6 %
EPI CELLS #/AREA URNS AUTO: 1 /HPF (ref 0–5)
GFR SERPLBLD CREATININE-BSD FMLA CKD-EPI: 38 ML/MIN/{1.73_M2}
GLUCOSE BLD-MCNC: 107 MG/DL (ref 70–99)
GLUCOSE SERPL-MCNC: 171 MG/DL (ref 70–99)
GLUCOSE UR STRIP.AUTO-MCNC: NEGATIVE MG/DL
HCT VFR BLD AUTO: 45.4 % (ref 40.5–52.5)
HGB BLD-MCNC: 15.8 G/DL (ref 13.5–17.5)
HGB UR QL STRIP.AUTO: NEGATIVE
HYALINE CASTS #/AREA URNS AUTO: 1 /LPF (ref 0–8)
KETONES UR STRIP.AUTO-MCNC: 15 MG/DL
LACTATE BLDV-SCNC: 1.2 MMOL/L (ref 0.4–1.9)
LEUKOCYTE ESTERASE UR QL STRIP.AUTO: NEGATIVE
LIPASE SERPL-CCNC: 36 U/L (ref 13–60)
LYMPHOCYTES # BLD: 1.5 K/UL (ref 1–5.1)
LYMPHOCYTES NFR BLD: 15.9 %
MCH RBC QN AUTO: 32.9 PG (ref 26–34)
MCHC RBC AUTO-ENTMCNC: 34.8 G/DL (ref 31–36)
MCV RBC AUTO: 94.5 FL (ref 80–100)
MONOCYTES # BLD: 0.6 K/UL (ref 0–1.3)
MONOCYTES NFR BLD: 6.2 %
NEUTROPHILS # BLD: 6.9 K/UL (ref 1.7–7.7)
NEUTROPHILS NFR BLD: 75.8 %
NITRITE UR QL STRIP.AUTO: NEGATIVE
PERFORMED ON: ABNORMAL
PH UR STRIP.AUTO: 5 [PH] (ref 5–8)
PLATELET # BLD AUTO: 265 K/UL (ref 135–450)
PMV BLD AUTO: 7.8 FL (ref 5–10.5)
POTASSIUM SERPL-SCNC: 4.3 MMOL/L (ref 3.5–5.1)
PROT SERPL-MCNC: 7.6 G/DL (ref 6.4–8.2)
PROT UR STRIP.AUTO-MCNC: 30 MG/DL
RBC # BLD AUTO: 4.8 M/UL (ref 4.2–5.9)
RBC CLUMPS #/AREA URNS AUTO: 0 /HPF (ref 0–4)
SODIUM SERPL-SCNC: 136 MMOL/L (ref 136–145)
SP GR UR STRIP.AUTO: 1.01 (ref 1–1.03)
UA COMPLETE W REFLEX CULTURE PNL UR: ABNORMAL
UA DIPSTICK W REFLEX MICRO PNL UR: YES
URN SPEC COLLECT METH UR: ABNORMAL
UROBILINOGEN UR STRIP-ACNC: 0.2 E.U./DL
WBC # BLD AUTO: 9.2 K/UL (ref 4–11)
WBC #/AREA URNS AUTO: 0 /HPF (ref 0–5)

## 2024-02-14 PROCEDURE — 96367 TX/PROPH/DG ADDL SEQ IV INF: CPT

## 2024-02-14 PROCEDURE — 96365 THER/PROPH/DIAG IV INF INIT: CPT

## 2024-02-14 PROCEDURE — 74176 CT ABD & PELVIS W/O CONTRAST: CPT

## 2024-02-14 PROCEDURE — 83690 ASSAY OF LIPASE: CPT

## 2024-02-14 PROCEDURE — 2580000003 HC RX 258: Performed by: STUDENT IN AN ORGANIZED HEALTH CARE EDUCATION/TRAINING PROGRAM

## 2024-02-14 PROCEDURE — 6360000002 HC RX W HCPCS: Performed by: STUDENT IN AN ORGANIZED HEALTH CARE EDUCATION/TRAINING PROGRAM

## 2024-02-14 PROCEDURE — 6360000002 HC RX W HCPCS: Performed by: EMERGENCY MEDICINE

## 2024-02-14 PROCEDURE — 2580000003 HC RX 258: Performed by: EMERGENCY MEDICINE

## 2024-02-14 PROCEDURE — 80053 COMPREHEN METABOLIC PANEL: CPT

## 2024-02-14 PROCEDURE — 6360000002 HC RX W HCPCS: Performed by: INTERNAL MEDICINE

## 2024-02-14 PROCEDURE — 83036 HEMOGLOBIN GLYCOSYLATED A1C: CPT

## 2024-02-14 PROCEDURE — 96361 HYDRATE IV INFUSION ADD-ON: CPT

## 2024-02-14 PROCEDURE — 96376 TX/PRO/DX INJ SAME DRUG ADON: CPT

## 2024-02-14 PROCEDURE — G0378 HOSPITAL OBSERVATION PER HR: HCPCS

## 2024-02-14 PROCEDURE — 96375 TX/PRO/DX INJ NEW DRUG ADDON: CPT

## 2024-02-14 PROCEDURE — 99285 EMERGENCY DEPT VISIT HI MDM: CPT

## 2024-02-14 PROCEDURE — 81001 URINALYSIS AUTO W/SCOPE: CPT

## 2024-02-14 PROCEDURE — 6370000000 HC RX 637 (ALT 250 FOR IP): Performed by: INTERNAL MEDICINE

## 2024-02-14 PROCEDURE — 83605 ASSAY OF LACTIC ACID: CPT

## 2024-02-14 PROCEDURE — 85025 COMPLETE CBC W/AUTO DIFF WBC: CPT

## 2024-02-14 PROCEDURE — 96372 THER/PROPH/DIAG INJ SC/IM: CPT

## 2024-02-14 RX ORDER — OXYCODONE HYDROCHLORIDE AND ACETAMINOPHEN 5; 325 MG/1; MG/1
1 TABLET ORAL EVERY 4 HOURS PRN
Status: DISCONTINUED | OUTPATIENT
Start: 2024-02-14 | End: 2024-02-15 | Stop reason: HOSPADM

## 2024-02-14 RX ORDER — ENOXAPARIN SODIUM 100 MG/ML
40 INJECTION SUBCUTANEOUS DAILY
Status: DISCONTINUED | OUTPATIENT
Start: 2024-02-14 | End: 2024-02-15 | Stop reason: HOSPADM

## 2024-02-14 RX ORDER — ONDANSETRON 4 MG/1
4 TABLET, ORALLY DISINTEGRATING ORAL EVERY 8 HOURS PRN
Status: DISCONTINUED | OUTPATIENT
Start: 2024-02-14 | End: 2024-02-15 | Stop reason: HOSPADM

## 2024-02-14 RX ORDER — ACETAMINOPHEN 325 MG/1
650 TABLET ORAL EVERY 6 HOURS PRN
Status: DISCONTINUED | OUTPATIENT
Start: 2024-02-14 | End: 2024-02-15 | Stop reason: HOSPADM

## 2024-02-14 RX ORDER — INSULIN LISPRO 100 [IU]/ML
0-4 INJECTION, SOLUTION INTRAVENOUS; SUBCUTANEOUS NIGHTLY
Status: DISCONTINUED | OUTPATIENT
Start: 2024-02-14 | End: 2024-02-15 | Stop reason: HOSPADM

## 2024-02-14 RX ORDER — ONDANSETRON 2 MG/ML
4 INJECTION INTRAMUSCULAR; INTRAVENOUS
Status: COMPLETED | OUTPATIENT
Start: 2024-02-14 | End: 2024-02-14

## 2024-02-14 RX ORDER — FAMOTIDINE 20 MG/1
20 TABLET, FILM COATED ORAL PRN
Status: DISCONTINUED | OUTPATIENT
Start: 2024-02-14 | End: 2024-02-15 | Stop reason: HOSPADM

## 2024-02-14 RX ORDER — ASPIRIN 81 MG/1
81 TABLET ORAL DAILY
Status: DISCONTINUED | OUTPATIENT
Start: 2024-02-15 | End: 2024-02-15 | Stop reason: HOSPADM

## 2024-02-14 RX ORDER — SODIUM CHLORIDE, SODIUM LACTATE, POTASSIUM CHLORIDE, CALCIUM CHLORIDE 600; 310; 30; 20 MG/100ML; MG/100ML; MG/100ML; MG/100ML
INJECTION, SOLUTION INTRAVENOUS CONTINUOUS
Status: DISCONTINUED | OUTPATIENT
Start: 2024-02-14 | End: 2024-02-15 | Stop reason: HOSPADM

## 2024-02-14 RX ORDER — AZELASTINE 1 MG/ML
1 SPRAY, METERED NASAL PRN
Status: DISCONTINUED | OUTPATIENT
Start: 2024-02-14 | End: 2024-02-15 | Stop reason: HOSPADM

## 2024-02-14 RX ORDER — INSULIN LISPRO 100 [IU]/ML
0-8 INJECTION, SOLUTION INTRAVENOUS; SUBCUTANEOUS
Status: DISCONTINUED | OUTPATIENT
Start: 2024-02-15 | End: 2024-02-15 | Stop reason: HOSPADM

## 2024-02-14 RX ORDER — AMLODIPINE BESYLATE 5 MG/1
7.5 TABLET ORAL DAILY
Status: DISCONTINUED | OUTPATIENT
Start: 2024-02-15 | End: 2024-02-15 | Stop reason: HOSPADM

## 2024-02-14 RX ORDER — 0.9 % SODIUM CHLORIDE 0.9 %
30 INTRAVENOUS SOLUTION INTRAVENOUS ONCE
Status: COMPLETED | OUTPATIENT
Start: 2024-02-14 | End: 2024-02-14

## 2024-02-14 RX ORDER — POLYETHYLENE GLYCOL 3350 17 G/17G
17 POWDER, FOR SOLUTION ORAL DAILY PRN
Status: DISCONTINUED | OUTPATIENT
Start: 2024-02-14 | End: 2024-02-15 | Stop reason: HOSPADM

## 2024-02-14 RX ORDER — TAMSULOSIN HYDROCHLORIDE 0.4 MG/1
0.4 CAPSULE ORAL DAILY
Status: DISCONTINUED | OUTPATIENT
Start: 2024-02-15 | End: 2024-02-15 | Stop reason: HOSPADM

## 2024-02-14 RX ORDER — ONDANSETRON 2 MG/ML
4 INJECTION INTRAMUSCULAR; INTRAVENOUS EVERY 6 HOURS PRN
Status: DISCONTINUED | OUTPATIENT
Start: 2024-02-14 | End: 2024-02-15 | Stop reason: HOSPADM

## 2024-02-14 RX ORDER — GLUCAGON 1 MG/ML
1 KIT INJECTION PRN
Status: DISCONTINUED | OUTPATIENT
Start: 2024-02-14 | End: 2024-02-14 | Stop reason: RX

## 2024-02-14 RX ORDER — DEXTROSE MONOHYDRATE 100 MG/ML
INJECTION, SOLUTION INTRAVENOUS CONTINUOUS PRN
Status: DISCONTINUED | OUTPATIENT
Start: 2024-02-14 | End: 2024-02-15 | Stop reason: HOSPADM

## 2024-02-14 RX ORDER — POTASSIUM CHLORIDE 7.45 MG/ML
10 INJECTION INTRAVENOUS PRN
Status: DISCONTINUED | OUTPATIENT
Start: 2024-02-14 | End: 2024-02-15 | Stop reason: HOSPADM

## 2024-02-14 RX ORDER — MORPHINE SULFATE 2 MG/ML
2 INJECTION, SOLUTION INTRAMUSCULAR; INTRAVENOUS EVERY 4 HOURS PRN
Status: DISCONTINUED | OUTPATIENT
Start: 2024-02-14 | End: 2024-02-15 | Stop reason: HOSPADM

## 2024-02-14 RX ORDER — MAGNESIUM SULFATE IN WATER 40 MG/ML
2000 INJECTION, SOLUTION INTRAVENOUS PRN
Status: DISCONTINUED | OUTPATIENT
Start: 2024-02-14 | End: 2024-02-15 | Stop reason: HOSPADM

## 2024-02-14 RX ORDER — SODIUM CHLORIDE 9 MG/ML
INJECTION, SOLUTION INTRAVENOUS PRN
Status: DISCONTINUED | OUTPATIENT
Start: 2024-02-14 | End: 2024-02-15 | Stop reason: HOSPADM

## 2024-02-14 RX ORDER — POTASSIUM CHLORIDE 20 MEQ/1
40 TABLET, EXTENDED RELEASE ORAL PRN
Status: DISCONTINUED | OUTPATIENT
Start: 2024-02-14 | End: 2024-02-15 | Stop reason: HOSPADM

## 2024-02-14 RX ORDER — SODIUM CHLORIDE 0.9 % (FLUSH) 0.9 %
5-40 SYRINGE (ML) INJECTION EVERY 12 HOURS SCHEDULED
Status: DISCONTINUED | OUTPATIENT
Start: 2024-02-14 | End: 2024-02-15 | Stop reason: HOSPADM

## 2024-02-14 RX ORDER — ACETAMINOPHEN 650 MG/1
650 SUPPOSITORY RECTAL EVERY 6 HOURS PRN
Status: DISCONTINUED | OUTPATIENT
Start: 2024-02-14 | End: 2024-02-15 | Stop reason: HOSPADM

## 2024-02-14 RX ORDER — ALBUTEROL SULFATE 90 UG/1
2 AEROSOL, METERED RESPIRATORY (INHALATION) 4 TIMES DAILY PRN
Status: DISCONTINUED | OUTPATIENT
Start: 2024-02-14 | End: 2024-02-15 | Stop reason: HOSPADM

## 2024-02-14 RX ORDER — ALLOPURINOL 100 MG/1
100 TABLET ORAL DAILY
Status: DISCONTINUED | OUTPATIENT
Start: 2024-02-15 | End: 2024-02-15 | Stop reason: HOSPADM

## 2024-02-14 RX ORDER — SODIUM CHLORIDE 0.9 % (FLUSH) 0.9 %
5-40 SYRINGE (ML) INJECTION PRN
Status: DISCONTINUED | OUTPATIENT
Start: 2024-02-14 | End: 2024-02-15 | Stop reason: HOSPADM

## 2024-02-14 RX ORDER — MORPHINE SULFATE 4 MG/ML
4 INJECTION, SOLUTION INTRAMUSCULAR; INTRAVENOUS
Status: COMPLETED | OUTPATIENT
Start: 2024-02-14 | End: 2024-02-14

## 2024-02-14 RX ADMIN — MORPHINE SULFATE 4 MG: 4 INJECTION, SOLUTION INTRAMUSCULAR; INTRAVENOUS at 12:00

## 2024-02-14 RX ADMIN — ONDANSETRON 4 MG: 2 INJECTION INTRAMUSCULAR; INTRAVENOUS at 14:57

## 2024-02-14 RX ADMIN — SODIUM CHLORIDE 2052 ML: 9 INJECTION, SOLUTION INTRAVENOUS at 11:59

## 2024-02-14 RX ADMIN — CEFTRIAXONE SODIUM 1000 MG: 1 INJECTION, POWDER, FOR SOLUTION INTRAMUSCULAR; INTRAVENOUS at 13:58

## 2024-02-14 RX ADMIN — MORPHINE SULFATE 4 MG: 4 INJECTION, SOLUTION INTRAMUSCULAR; INTRAVENOUS at 14:57

## 2024-02-14 RX ADMIN — MORPHINE SULFATE 2 MG: 2 INJECTION, SOLUTION INTRAMUSCULAR; INTRAVENOUS at 21:14

## 2024-02-14 RX ADMIN — ONDANSETRON 4 MG: 2 INJECTION INTRAMUSCULAR; INTRAVENOUS at 21:14

## 2024-02-14 RX ADMIN — SODIUM CHLORIDE, PRESERVATIVE FREE 10 ML: 5 INJECTION INTRAVENOUS at 20:56

## 2024-02-14 RX ADMIN — ONDANSETRON 4 MG: 2 INJECTION INTRAMUSCULAR; INTRAVENOUS at 12:00

## 2024-02-14 RX ADMIN — SODIUM CHLORIDE, POTASSIUM CHLORIDE, SODIUM LACTATE AND CALCIUM CHLORIDE: 600; 310; 30; 20 INJECTION, SOLUTION INTRAVENOUS at 20:56

## 2024-02-14 RX ADMIN — ENOXAPARIN SODIUM 40 MG: 100 INJECTION SUBCUTANEOUS at 20:56

## 2024-02-14 NOTE — PROGRESS NOTES
Pharmacy Home Medication Reconciliation Note    A medication reconciliation has been completed for Toño Grewal 1965    [facility] [address] [place]    Information provided by: Patient    The patient's home medication list is as follows:  No current facility-administered medications on file prior to encounter.     Current Outpatient Medications on File Prior to Encounter   Medication Sig Dispense Refill    ondansetron (ZOFRAN) 4 MG tablet Take 1 tablet by mouth every 8 hours as needed for Nausea 20 tablet 0    tamsulosin (FLOMAX) 0.4 MG capsule Take 1 capsule by mouth daily for 5 doses 5 capsule 0    oxyCODONE-acetaminophen (PERCOCET) 5-325 MG per tablet Take 1 tablet by mouth every 4 hours as needed for Pain for up to 5 days. Intended supply: 3 days. Take lowest dose possible to manage pain Max Daily Amount: 6 tablets 30 tablet 0    tamsulosin (FLOMAX) 0.4 MG capsule Take 1 capsule by mouth daily 30 capsule 0    [] oxyCODONE-acetaminophen (PERCOCET) 5-325 MG per tablet Take 1 tablet by mouth every 6 hours as needed for Pain for up to 3 days. Intended supply: 3 days. Take lowest dose possible to manage pain Max Daily Amount: 4 tablets 12 tablet 0    Potassium Citrate ER (UROCIT-K) 15 MEQ (1620 MG) TBCR extended release tablet TAKE 3 TABLETS BY MOUTH DAILY (Patient taking differently: Take 3 tablets by mouth daily 1 in the morning and 2 in evening) 270 tablet 1    allopurinol (ZYLOPRIM) 100 MG tablet Take 1 tablet by mouth daily 90 tablet 1    glimepiride (AMARYL) 1 MG tablet TAKE 1 TABLET BY MOUTH EVERY DAY IN THE MORNING- 90 tablet 1    metFORMIN (GLUCOPHAGE-XR) 500 MG extended release tablet TAKE 1 TABLET BY MOUTH EVERY DAY 90 tablet 1    amLODIPine (NORVASC) 5 MG tablet TAKE 1 AND 1/2 TABLETS DAILY BY MOUTH 135 tablet 3    Continuous Blood Gluc Sensor (DEXCOM G6 SENSOR) MISC Use as directed 1 each 9    Continuous Blood Gluc Transmit (DEXCOM G6 TRANSMITTER) MISC

## 2024-02-14 NOTE — ED PROVIDER NOTES
[facility] Emergency Department [facility]    I, Baljeet Sampson MD, am the primary clinician of record.    CHIEF COMPLAINT  Chief Complaint   Patient presents with    Flank Pain     Pt reports right side flank pain since Sat, worsening pain radiating to mid abdomen. Was seen here sat and diagnosed with kidney stone        HISTORY OF PRESENT ILLNESS  Toño Grewal is a 58 y.o. male  who presents to the ED complaining of persistence of right flank pain rating to the right low back and right lower quadrant.  He reports some urinary urgency as well that is persistent.  He was seen here 4 days ago at which time he was diagnosed with a 5 mm ureteral stone.  He has a history of kidney stones in the past.  He denies any acute testicular pain.  He has tried to pass the stone conservatively with medications but his pain is still quite significant and he has not passed the stone.  He has not had a fever but has had nausea.  No vomiting or constipation or diarrhea.  No left-sided flank pain at this time.    No other complaints, modifying factors or associated symptoms.     I have reviewed the following from the nursing documentation.    Past Medical History:   Diagnosis Date    Diabetes mellitus (HCC)     Hypertension     Obesity, unspecified     MARIA A on CPAP     Other abnormal blood chemistry     Seasonal allergies     Unspecified sleep apnea      Past Surgical History:   Procedure Laterality Date    CATARACT REMOVAL WITH IMPLANT  2009, 2010    B eyes    CYSTOSCOPY Left 08/24/2021    CYSTOSCOPY, LEFT RETROGRADE PYELOGRAM, PLACEMENT OF LEFT URETERAL STENT performed by Blaise Ritchie MD at [facility] OR    CYSTOSCOPY Left 09/02/2021    CYSTOSCOPY WITH LEFT URETEROSCOPY WITH HOLMIUM LASER LITHOTRIPSY, STONE MANIPULATION AND LEFT STENT REMOVAL performed by Blaise Ritchie MD at [facility] OR    CYSTOSCOPY Left 04/09/2022    CYSTOSCOPY LEFT URETERAL STENT INSERTION performed by Blaise Ritchie MD at [facility] OR

## 2024-02-14 NOTE — PROGRESS NOTES
Patient seen in ED, room 11.  Admission completed with the following exceptions:  4 Eyes Assessment, Immunizations, Vaccines, Rights and Responsibilities, Orientation to room, Plan of Care, Education/Learning Assessment and Education Plan, white board, height and weight, pain assessment and head to toe assessment.  Patient is alert and oriented X 4.  Patient lives in a one story with his wife and grandson.  Home Medications as well as Outside Sources have been verbally reviewed with patient and updated if appropriate.  Medication reconciliation is now Completed.  All questions answered.   Wife is at bedside.    Patient requesting more pain and nausea medication: ER nurse informed

## 2024-02-15 VITALS
SYSTOLIC BLOOD PRESSURE: 168 MMHG | HEART RATE: 94 BPM | RESPIRATION RATE: 16 BRPM | OXYGEN SATURATION: 94 % | DIASTOLIC BLOOD PRESSURE: 91 MMHG | TEMPERATURE: 99.1 F

## 2024-02-15 LAB
ALBUMIN SERPL-MCNC: 3.4 G/DL (ref 3.4–5)
ALBUMIN/GLOB SERPL: 1 {RATIO} (ref 1.1–2.2)
ALP SERPL-CCNC: 66 U/L (ref 40–129)
ALT SERPL-CCNC: 24 U/L (ref 10–40)
ANION GAP SERPL CALCULATED.3IONS-SCNC: 10 MMOL/L (ref 3–16)
AST SERPL-CCNC: 22 U/L (ref 15–37)
BASOPHILS # BLD: 0 K/UL (ref 0–0.2)
BASOPHILS NFR BLD: 0.4 %
BILIRUB SERPL-MCNC: 0.7 MG/DL (ref 0–1)
BUN SERPL-MCNC: 23 MG/DL (ref 7–20)
CALCIUM SERPL-MCNC: 8.6 MG/DL (ref 8.3–10.6)
CHLORIDE SERPL-SCNC: 102 MMOL/L (ref 99–110)
CO2 SERPL-SCNC: 25 MMOL/L (ref 21–32)
CREAT SERPL-MCNC: 1.9 MG/DL (ref 0.9–1.3)
DEPRECATED RDW RBC AUTO: 13.6 % (ref 12.4–15.4)
EOSINOPHIL # BLD: 0.3 K/UL (ref 0–0.6)
EOSINOPHIL NFR BLD: 4 %
EST. AVERAGE GLUCOSE BLD GHB EST-MCNC: 205.9 MG/DL
GFR SERPLBLD CREATININE-BSD FMLA CKD-EPI: 41 ML/MIN/{1.73_M2}
GLUCOSE BLD-MCNC: 132 MG/DL (ref 70–99)
GLUCOSE SERPL-MCNC: 125 MG/DL (ref 70–99)
HBA1C MFR BLD: 8.8 %
HCT VFR BLD AUTO: 40.9 % (ref 40.5–52.5)
HGB BLD-MCNC: 14.4 G/DL (ref 13.5–17.5)
LYMPHOCYTES # BLD: 1.8 K/UL (ref 1–5.1)
LYMPHOCYTES NFR BLD: 23 %
MCH RBC QN AUTO: 33.1 PG (ref 26–34)
MCHC RBC AUTO-ENTMCNC: 35.3 G/DL (ref 31–36)
MCV RBC AUTO: 93.8 FL (ref 80–100)
MONOCYTES # BLD: 0.7 K/UL (ref 0–1.3)
MONOCYTES NFR BLD: 8.9 %
NEUTROPHILS # BLD: 4.9 K/UL (ref 1.7–7.7)
NEUTROPHILS NFR BLD: 63.7 %
PERFORMED ON: ABNORMAL
PLATELET # BLD AUTO: 269 K/UL (ref 135–450)
PMV BLD AUTO: 7.6 FL (ref 5–10.5)
POTASSIUM SERPL-SCNC: 4.2 MMOL/L (ref 3.5–5.1)
PROT SERPL-MCNC: 6.8 G/DL (ref 6.4–8.2)
RBC # BLD AUTO: 4.36 M/UL (ref 4.2–5.9)
SODIUM SERPL-SCNC: 137 MMOL/L (ref 136–145)
WBC # BLD AUTO: 7.8 K/UL (ref 4–11)

## 2024-02-15 PROCEDURE — 6360000002 HC RX W HCPCS: Performed by: INTERNAL MEDICINE

## 2024-02-15 PROCEDURE — 2580000003 HC RX 258: Performed by: STUDENT IN AN ORGANIZED HEALTH CARE EDUCATION/TRAINING PROGRAM

## 2024-02-15 PROCEDURE — 96376 TX/PRO/DX INJ SAME DRUG ADON: CPT

## 2024-02-15 PROCEDURE — 96361 HYDRATE IV INFUSION ADD-ON: CPT

## 2024-02-15 PROCEDURE — 80053 COMPREHEN METABOLIC PANEL: CPT

## 2024-02-15 PROCEDURE — 36415 COLL VENOUS BLD VENIPUNCTURE: CPT

## 2024-02-15 PROCEDURE — 85025 COMPLETE CBC W/AUTO DIFF WBC: CPT

## 2024-02-15 PROCEDURE — 6370000000 HC RX 637 (ALT 250 FOR IP): Performed by: INTERNAL MEDICINE

## 2024-02-15 PROCEDURE — 6360000002 HC RX W HCPCS: Performed by: STUDENT IN AN ORGANIZED HEALTH CARE EDUCATION/TRAINING PROGRAM

## 2024-02-15 PROCEDURE — G0378 HOSPITAL OBSERVATION PER HR: HCPCS

## 2024-02-15 RX ADMIN — ALLOPURINOL 100 MG: 100 TABLET ORAL at 08:21

## 2024-02-15 RX ADMIN — ONDANSETRON 4 MG: 2 INJECTION INTRAMUSCULAR; INTRAVENOUS at 08:21

## 2024-02-15 RX ADMIN — MORPHINE SULFATE 2 MG: 2 INJECTION, SOLUTION INTRAMUSCULAR; INTRAVENOUS at 03:53

## 2024-02-15 RX ADMIN — AMLODIPINE BESYLATE 7.5 MG: 5 TABLET ORAL at 08:21

## 2024-02-15 RX ADMIN — SODIUM CHLORIDE, POTASSIUM CHLORIDE, SODIUM LACTATE AND CALCIUM CHLORIDE: 600; 310; 30; 20 INJECTION, SOLUTION INTRAVENOUS at 08:20

## 2024-02-15 RX ADMIN — TAMSULOSIN HYDROCHLORIDE 0.4 MG: 0.4 CAPSULE ORAL at 08:21

## 2024-02-15 RX ADMIN — ASPIRIN 81 MG: 81 TABLET, COATED ORAL at 08:21

## 2024-02-15 RX ADMIN — OXYCODONE HYDROCHLORIDE AND ACETAMINOPHEN 1 TABLET: 5; 325 TABLET ORAL at 08:21

## 2024-02-15 NOTE — PROGRESS NOTES
Gave d/c instructions with list of active meds and when they are next due. Reviewed discharge instructions at bedside and provided printed copy of same. Pt verbalized understanding of all instructions. Denied additional questions. Ok to D/c with IV per Vinod Abraham. Clamped IV aand blue cap in place. Pt to travel to [facility] for procedure this a.m. as there was a cancellation.

## 2024-02-15 NOTE — DISCHARGE SUMMARY
tamsulosin 0.4 MG capsule  Commonly known as: FLOMAX  Take 1 capsule by mouth daily           * This list has 2 medication(s) that are the same as other medications prescribed for you. Read the directions carefully, and ask your doctor or other care provider to review them with you.                  Discharge recommendations given to patient.  Follow Up.  in 1 week   Disposition.  Urology center   Activity. activity as tolerated  Diet: No diet orders on file      Spent 35 minutes in discharge process.    Signed:  QUINCY Ahuja CNP     2/15/2024 1:07 PM

## 2024-02-15 NOTE — CONSULTS
Urology Consult Note  [facility]     Patient: Toño Grewal MRN: [id number]  Room/Bed: [facility]-4460/4460-01   YOB: 1965  Age/Sex: 58 y.o.male  Admission Date: 2/14/2024     Date of Service:  2/15/2024    Consulting Provider: QUINCY Lloyd CNP   Admitting/Requesting Physician: Evan Taveras MD  Primary Care Physician: Candido Cloón MD    Reason for Consult: Ureteral Stone    ASSESSMENT/PLAN   59 yo male    6mm stone distal right ureter  Mild ONOFRE of 1.9  Urine appears sterile  No leukocytosis      Recommendations:  Transfer to [facility] for surgery with his primary urologist Dr. Desir. Leave IV in place.  Discussed with hospitalist and patient/family.     All patient questions were answered. He understands the plan as listed above.    HISTORY     Chief Complaint:   Chief Complaint   Patient presents with    Flank Pain     Pt reports right side flank pain since Sat, worsening pain radiating to mid abdomen. Was seen here sat and diagnosed with kidney stone       History of Present Illness: Toño Grewal is a 58 y.o. male with ureteral stone. Onset of symptoms was days ago with improving course since that time. Symptoms are aggravated by ureteral stone. Symptoms improved with pain medication. Associated symptoms include flank pain. Patient also reports nausea. He has tried the following treatments: morphine and zofran.     Past Medical History:  He has a past medical history of Diabetes mellitus (HCC), Hypertension, Obesity, unspecified, MARIA A on CPAP, Other abnormal blood chemistry, Seasonal allergies, and Unspecified sleep apnea.     Hospital Problem List:  Principal Problem:    Renal stone  Resolved Problems:    * No resolved hospital problems. *      Past Surgical History:  He has a past surgical history that includes Cataract removal with implant (2009, 2010); Tooth Extraction (08/2018); Cystoscopy (Left, 08/24/2021); Cystoscopy (Left, 09/02/2021); Cystoscopy (Left, 04/09/2022);

## 2024-02-15 NOTE — PROGRESS NOTES
02/15/24 0452   RT Protocol   History Pulmonary Disease 0   Respiratory pattern 0   Breath sounds 0   Cough 0   Bronchodilator Assessment Score 0        MOLECULAR PCR

## 2024-02-15 NOTE — ED NOTES
ED TO INPATIENT SBAR HANDOFF    Patient Name: Toño Grewal   :  1965  58 y.o.   MRN:  [id number]  Preferred Name    ED Room #:  ED-0011/11  Family/Caregiver Present no   Restraints no   Sitter no   Sepsis Risk Score Sepsis Risk Score: 0.94    Situation  Code Status: Full Code No additional code details.    Allergies: Ace inhibitors and Pollen extract  Weight: No data found.  Arrived from: home  Chief Complaint:   Chief Complaint   Patient presents with    Flank Pain     Pt reports right side flank pain since Sat, worsening pain radiating to mid abdomen. Was seen here sat and diagnosed with kidney stone     Hospital Problem/Diagnosis:  Principal Problem:    Renal stone  Resolved Problems:    * No resolved hospital problems. *    Imaging:   CT ABDOMEN PELVIS WO CONTRAST Additional Contrast? None   Final Result   1. Obstructive uropathy with 6 mm calculus now in the distal right ureter.   2. Fatty liver.           Abnormal labs:   Abnormal Labs Reviewed   COMPREHENSIVE METABOLIC PANEL W/ REFLEX TO MG FOR LOW K - Abnormal; Notable for the following components:       Result Value    Glucose 171 (*)     BUN 28 (*)     Creatinine 2.0 (*)     Est, Glom Filt Rate 38 (*)     All other components within normal limits   URINALYSIS WITH REFLEX TO CULTURE - Abnormal; Notable for the following components:    Ketones, Urine 15 (*)     Protein, UA 30 (*)     All other components within normal limits     Critical values: no     Abnormal Assessment Findings:     Background  History:   Past Medical History:   Diagnosis Date    Diabetes mellitus (HCC)     Hypertension     Obesity, unspecified     MARIA A on CPAP     Other abnormal blood chemistry     Seasonal allergies     Unspecified sleep apnea        Assessment    Vitals/MEWS:    Level of Consciousness: Alert (0)   Vitals:    24 1731 24 1801 24 1831 24 1858   BP: (!) 153/88 (!) 149/90 (!) 149/86    Pulse:       Resp:       Temp:       TempSrc:

## 2024-02-15 NOTE — CARE COORDINATION
Discharge Planning Note:    Chart reviewed and it appears that patient has minimal needs for discharge at this time. Discussed with patient and requested that case management be notified if discharge needs are identified.     - Current discharge plan is for the patient to return home.    - PCP: Candido Colón    Case management will continue to follow progress and update discharge plan as needed.      Risk of Readmission Score: Observation    ISATU Covarrubias RN    [facility]  Phone: [phone]

## 2024-02-15 NOTE — H&P
V2.0  History and Physical      Name:  Toño Grewal /Age/Sex: 1965  (58 y.o. male)   MRN & CSN:  [id number] & [id number] Encounter Date/Time: 2024 9:03 PM EST   Location:  [facility]4460/4460-01 PCP: Candido Colón MD       Hospital Day: 1    Assessment and Plan:   Toño Grewal is a 58 y.o. male with a pmh of hypertension, mild intermittent bronchial asthma, diabetes type 2, history of kidney stone and obesity/MARIA A presenting with intermittent right flank pain for 5 days.        Hospital Problems             Last Modified POA    * (Principal) Renal stone 2024 Yes       1.  Right ureteric stone: Patient has failed outpatient conservative management.  Continue Flomax and start on IV fluid hydration.  Consult urology for possible cystoscopy and removal of the stone.    2.  Acute kidney injury: Due to kidney stone and dehydration.  Start on IV fluid hydration.  Hold KCl for now.  Avoid nephrotoxic medication.  Monitor chemistry daily.    3.  Diabetes type 2: Hold Amaryl and metformin.  Check hemoglobin A1c in AM.  Monitor glucose and cover with insulin sliding scale.    Chronic problems include hypertension, mild intermittent asthma and obesity/MARIA A.    DVT prophylaxis: Lovenox    Disposition:   Current Living situation: He lives with his wife.  Expected Disposition: Home  Estimated D/C: 2 days    Diet ADULT DIET; Regular   DVT Prophylaxis [x] Lovenox, []  Heparin, [] SCDs, [] Ambulation,  [] Eliquis, [] Xarelto, [] Coumadin   Code Status Full Code   Surrogate Decision Maker/ POA      Personally reviewed Lab Studies and Imaging     History from:     patient    History of Present Illness:     Chief Complaint: Right flank pain for 5 days    Toño Grewal is a 58 y.o. male with pmh of hypertension, mild intermittent bronchial asthma, diabetes type 2, history of kidney stone and obesity/MARIA A presenting with intermittent right flank pain for 5 days.    Patient has been having intermittent right flank

## 2024-02-15 NOTE — RT PROTOCOL NOTE
Protocol order mode.        4-6 - enter or revise RT Bronchodilator order(s) to two equivalent RT bronchodilator orders with one order with BID Frequency and one order with Frequency of every 4 hours PRN wheezing or increased work of breathing using Per Protocol order mode.        7-10 - enter or revise RT Bronchodilator order(s) to two equivalent RT bronchodilator orders with one order with TID Frequency and one order with Frequency of every 4 hours PRN wheezing or increased work of breathing using Per Protocol order mode.       11-13 - enter or revise RT Bronchodilator order(s) to one equivalent RT bronchodilator order with QID Frequency and an Albuterol order with Frequency of every 4 hours PRN wheezing or increased work of breathing using Per Protocol order mode.      Greater than 13 - enter or revise RT Bronchodilator order(s) to one equivalent RT bronchodilator order with every 4 hours Frequency and an Albuterol order with Frequency of every 2 hours PRN wheezing or increased work of breathing using Per Protocol order mode.     RT to enter RT Home Evaluation for COPD & MDI Assessment order using Per Protocol order mode.    Electronically signed by Ludy Heath RCP on 2/15/2024 at 4:51 AM

## 2024-02-19 RX ORDER — GLIMEPIRIDE 2 MG/1
TABLET ORAL
Qty: 90 TABLET | Refills: 0 | OUTPATIENT
Start: 2024-02-19

## 2024-02-26 ENCOUNTER — HOSPITAL ENCOUNTER (OUTPATIENT)
Age: 59
Discharge: HOME | End: 2024-02-26
Payer: COMMERCIAL

## 2024-02-26 LAB
ANION GAP SERPL CALCULATED.3IONS-SCNC: 12 MMOL/L (ref 3–16)
BUN SERPL-MCNC: 30 MG/DL (ref 7–20)
CALCIUM SERPL-MCNC: 9.6 MG/DL (ref 8.3–10.6)
CHLORIDE SERPL-SCNC: 105 MMOL/L (ref 99–110)
CO2 SERPL-SCNC: 25 MMOL/L (ref 21–32)
CREAT SERPL-MCNC: 1.4 MG/DL (ref 0.9–1.3)
GFR SERPLBLD CREATININE-BSD FMLA CKD-EPI: 59 ML/MIN/{1.73_M2}
GLUCOSE SERPL-MCNC: 107 MG/DL (ref 70–99)
POTASSIUM SERPL-SCNC: 4.6 MMOL/L (ref 3.5–5.1)
SODIUM SERPL-SCNC: 142 MMOL/L (ref 136–145)

## 2024-02-26 PROCEDURE — 80048 BASIC METABOLIC PNL TOTAL CA: CPT

## 2024-02-26 PROCEDURE — 36415 COLL VENOUS BLD VENIPUNCTURE: CPT

## 2024-02-29 ENCOUNTER — HOSPITAL ENCOUNTER (OUTPATIENT)
Dept: PHYSICAL THERAPY | Age: 59
Setting detail: THERAPIES SERIES
Discharge: HOME | End: 2024-02-29
Payer: COMMERCIAL

## 2024-02-29 PROCEDURE — 97110 THERAPEUTIC EXERCISES: CPT

## 2024-02-29 PROCEDURE — 97530 THERAPEUTIC ACTIVITIES: CPT

## 2024-02-29 NOTE — PLAN OF CARE
either an injury or surgery.   (99332) THERAPEUTIC ACTIVITY - use of dynamic activities to improve functional performance. (Ex include squatting, ascending/descending stairs, walking, bending, lifting, catching, throwing, pushing, pulling, jumping.)  Direct, one on one contact, billed in 15-minute increments.  (69001) MANUAL THERAPY -  Manual therapy techniques, 1 or more regions, each 15 minutes (Mobilization/manipulation, manual lymphatic drainage, manual traction) for the purpose of modulating pain, promoting relaxation,  increasing ROM, reducing/eliminating soft tissue swelling/inflammation/restriction, improving soft tissue extensibility and allowing for proper ROM for normal function with self care, mobility, lifting and ambulation    TREATMENT PLAN   Plan: Cont POC- Continue emphasis/focus on exercise progression, improving proper muscle recruitment and activation/motor control patterns, and modulating pain. Next visit plan to progress weights, progress reps, and add new exercises     Electronically Signed by Niurka Marcum PT, DPT   Date: 02/29/2024     Note: If patient does not return for scheduled/recommended follow up visits, this note will serve as a discharge from care along with the most recent update on progress.

## 2024-03-01 ENCOUNTER — OFFICE VISIT (OUTPATIENT)
Dept: FAMILY MEDICINE CLINIC | Age: 59
End: 2024-03-01
Payer: COMMERCIAL

## 2024-03-01 VITALS
HEART RATE: 90 BPM | WEIGHT: 240 LBS | DIASTOLIC BLOOD PRESSURE: 76 MMHG | SYSTOLIC BLOOD PRESSURE: 130 MMHG | BODY MASS INDEX: 36.49 KG/M2 | OXYGEN SATURATION: 97 %

## 2024-03-01 DIAGNOSIS — M10.9 ACUTE GOUT OF LEFT FOOT, UNSPECIFIED CAUSE: Primary | ICD-10-CM

## 2024-03-01 PROCEDURE — 3075F SYST BP GE 130 - 139MM HG: CPT | Performed by: NURSE PRACTITIONER

## 2024-03-01 PROCEDURE — 99213 OFFICE O/P EST LOW 20 MIN: CPT | Performed by: NURSE PRACTITIONER

## 2024-03-01 PROCEDURE — 3078F DIAST BP <80 MM HG: CPT | Performed by: NURSE PRACTITIONER

## 2024-03-01 RX ORDER — NAPROXEN 500 MG/1
500 TABLET ORAL 2 TIMES DAILY WITH MEALS
Qty: 20 TABLET | Refills: 0 | Status: SHIPPED | OUTPATIENT
Start: 2024-03-01 | End: 2024-03-11

## 2024-03-01 ASSESSMENT — PATIENT HEALTH QUESTIONNAIRE - PHQ9
2. FEELING DOWN, DEPRESSED OR HOPELESS: 0
SUM OF ALL RESPONSES TO PHQ QUESTIONS 1-9: 0
SUM OF ALL RESPONSES TO PHQ QUESTIONS 1-9: 0
1. LITTLE INTEREST OR PLEASURE IN DOING THINGS: 0
SUM OF ALL RESPONSES TO PHQ9 QUESTIONS 1 & 2: 0
SUM OF ALL RESPONSES TO PHQ QUESTIONS 1-9: 0
SUM OF ALL RESPONSES TO PHQ QUESTIONS 1-9: 0

## 2024-03-01 NOTE — PROGRESS NOTES
complications of disease if inadequately treated.  Discussed side effects of medications, diagnosis, treatment options, and prognosis along with risks, benefits, complications, and alternatives of treatment including labs, imaging and other studies/treatment targets and goals.  He verbalized understanding of instructions and counseling.    Return if symptoms worsen or fail to improve.    Medical decision making of low complexity.

## 2024-03-06 ENCOUNTER — HOSPITAL ENCOUNTER (OUTPATIENT)
Dept: PHYSICAL THERAPY | Age: 59
Setting detail: THERAPIES SERIES
End: 2024-03-06
Attending: ORTHOPAEDIC SURGERY
Payer: COMMERCIAL

## 2024-03-08 ENCOUNTER — HOSPITAL ENCOUNTER (OUTPATIENT)
Dept: PHYSICAL THERAPY | Age: 59
Setting detail: THERAPIES SERIES
Discharge: HOME | End: 2024-03-08
Attending: ORTHOPAEDIC SURGERY
Payer: COMMERCIAL

## 2024-03-08 PROCEDURE — 97112 NEUROMUSCULAR REEDUCATION: CPT

## 2024-03-08 PROCEDURE — 97110 THERAPEUTIC EXERCISES: CPT

## 2024-03-08 NOTE — FLOWSHEET NOTE
[facility] - Outpatient Rehabilitation and Therapy [address], [place] [address] office: [phone] fax: [phone]        Physical Therapy: TREATMENT/PROGRESS NOTE   Patient: Toño Grewal (58 y.o. male)   Treatment Date: 2024   :  1965 MRN: [id number]   Visit #: 14   Insurance Allowable Auth Needed   18 approved (24 - 3/30/24) [x]Yes    []No  Workers comp    Insurance: Payor: [employer]IC [employer] / Plan: GENERIC [employer] WC / Product Type: *No Product type* /   Insurance ID: [id number] - (Worker's Comp)  Secondary Insurance (if applicable):    Treatment Diagnosis:       ICD-10-CM      1. Decreased functional activity tolerance  R68.89         2. Shoulder weakness  R29.898        Medical Diagnosis:    Nondisplaced fracture of sternal end of right clavicle, initial encounter for closed fracture [S42.017A]   Referring Physician: Meryl Pepe MD  PCP: Candido Colón MD                             Plan of care signed (Y/N): yes    Date of Patient follow up with Physician:      Progress Report/POC: NO  POC update due: (10 visits /OR AUTH LIMITS, whichever is less)    Precautions/ Contra-indications:                                                                                          Latex allergy:  NO  Pacemaker:    NO  Contraindications for Manipulation: None  Date of Surgery: none  Other:     Preferred Language for Healthcare:   [x]English       []other:    SUBJECTIVE EXAMINATION     Patient Report/Comments:  Pt states shoulder has its good and bad days, overall pain is mild.      Test used Initial score  23   Pain Summary VAS 3/10  1/10   Functional questionnaire Quick DASH % 19 / 18.18%   Other:                OBJECTIVE EXAMINATION     Observation: :  Pt with significant rounded shoulders and forward head.      Test measurements:   : bold denotes changes    Mvmt (norm) AROM L AROM R Notes                     SHOULDER Flexion (180) 160

## 2024-03-12 ENCOUNTER — HOSPITAL ENCOUNTER (OUTPATIENT)
Dept: PHYSICAL THERAPY | Age: 59
Setting detail: THERAPIES SERIES
Discharge: HOME | End: 2024-03-12
Attending: ORTHOPAEDIC SURGERY
Payer: COMMERCIAL

## 2024-03-12 PROCEDURE — 97110 THERAPEUTIC EXERCISES: CPT

## 2024-03-12 PROCEDURE — 97112 NEUROMUSCULAR REEDUCATION: CPT

## 2024-03-12 NOTE — FLOWSHEET NOTE
Next visit plan to progress weights, progress reps, and add new exercises     Electronically Signed by Isauro Luna PTA, ATC   Date: 03/12/2024     Note: If patient does not return for scheduled/recommended follow up visits, this note will serve as a discharge from care along with the most recent update on progress.

## 2024-03-13 ENCOUNTER — HOSPITAL ENCOUNTER (OUTPATIENT)
Dept: PHYSICAL THERAPY | Age: 59
Setting detail: THERAPIES SERIES
Discharge: HOME | End: 2024-03-13
Attending: ORTHOPAEDIC SURGERY
Payer: COMMERCIAL

## 2024-03-13 PROCEDURE — 97112 NEUROMUSCULAR REEDUCATION: CPT

## 2024-03-13 PROCEDURE — 97110 THERAPEUTIC EXERCISES: CPT

## 2024-03-13 NOTE — FLOWSHEET NOTE
necessary for medical necessity for care and/or justification of therapy services:  The patient has functional impairments and/or activity limitations and would benefit from continued outpatient therapy services to address the deficits outlined in the patients goals    Treatment/Activity Tolerance:  [x] Patient tolerated treatment well [] Patient limited by fatique  [] Patient limited by pain  [] Patient limited by other medical complications  [] Other:     Return to Play: NA    Prognosis for POC: [x] Good [] Fair  [] Poor    Patient requires continued skilled intervention: [x] Yes  [] No      GOALS   Short Term Goals: To be achieved in: 2 weeks  Independent in HEP and progression per patient tolerance, in order to progress toward full function and prevent re-injury.               Status: [] Progressing: [x] Met: [] Not Met: [] Adjusted  Patient will have a decrease in pain to 0/10 to help  facilitate improvement in movement, function, and ADLs as indicated by functional deficits.              Status: [x] Progressing: [] Met: [] Not Met: [] Adjusted     Long Term Goals: To be achieved in: 4-6weeks  Disability index score of 5% or less for the Quick DASH to assist with return to prior level of function.                         Status: [x] Progressing: [] Met: [] Not Met: [] Adjusted  LLE AROM = RLE AROM to allow for proper joint functioning as indicated by patients functional deficits.  Status: [] Progressing: [x] Met: [] Not Met: [] Adjusted  Pt to improve strength to 4+/5 or better of rotator cuff to allow for proper muscle and joint use in functional mobility, ADLs and prior level of function   Status: [] Progressing: [] Met: [] Not Met: [] Adjusted  Patient will return to Reaching activities without increased symptoms or restriction to work towards return to prior level of function.                               Status: [x] Progressing: [] Met: [] Not Met: [] Adjusted  Patient will increase UE function to return

## 2024-03-15 ENCOUNTER — HOSPITAL ENCOUNTER (OUTPATIENT)
Dept: PHYSICAL THERAPY | Age: 59
Setting detail: THERAPIES SERIES
Discharge: HOME | End: 2024-03-15
Attending: ORTHOPAEDIC SURGERY
Payer: COMMERCIAL

## 2024-03-15 PROCEDURE — 97110 THERAPEUTIC EXERCISES: CPT

## 2024-03-15 PROCEDURE — 97112 NEUROMUSCULAR REEDUCATION: CPT

## 2024-03-15 ASSESSMENT — ENCOUNTER SYMPTOMS
DIARRHEA: 0
COUGH: 0
NAUSEA: 0
VOMITING: 0
SHORTNESS OF BREATH: 0

## 2024-03-15 NOTE — FLOWSHEET NOTE
activation/motor control patterns, and modulating pain. Next visit plan to progress weights, progress reps, and add new exercises     Electronically Signed by Isauro Luna PTA, ATC   Date: 03/15/2024     Note: If patient does not return for scheduled/recommended follow up visits, this note will serve as a discharge from care along with the most recent update on progress.

## 2024-04-16 ENCOUNTER — OFFICE VISIT (OUTPATIENT)
Dept: ORTHOPEDIC SURGERY | Age: 59
End: 2024-04-16

## 2024-04-16 VITALS — HEIGHT: 68 IN | BODY MASS INDEX: 36.39 KG/M2 | WEIGHT: 240.08 LBS

## 2024-04-16 DIAGNOSIS — S42.017A CLOSED NONDISPLACED FRACTURE OF STERNAL END OF RIGHT CLAVICLE, INITIAL ENCOUNTER: Primary | ICD-10-CM

## 2024-04-16 NOTE — PROGRESS NOTES
CHIEF COMPLAINT: Right shoulder pain/ minimally displaced clavicle fracture.    DATE OF INJURY: 10/5/2023, DOT: 10/5/2023, Worker's Comp.    HISTORY:  Mr. Grewal is a 58 y.o.  male right handed who presents today for follow up evaluation of a right shoulder injury.  The patient reports that this injury occurred when he was pulling on a machine really hard when it became loose and he heard a pop in his shoulder and then had difficulty moving it without pain.  He was first seen and evaluated in [facility] ER, when he was x-rayed and splinted, and asked to f/u with Orthopedics. The patient denies any other injuries. He rates his pain a 7/10 VAS and is worse with certain movements out extended or overhead. LResting makes the pain better. No numbness or tingling sensation.  He works as a  requiring heavy duty activities and back to work.  Denies smoking.    Past Medical History:   Diagnosis Date    Diabetes mellitus (HCC)     Hypertension     Obesity, unspecified     MARIA A on CPAP     Other abnormal blood chemistry     Seasonal allergies     Unspecified sleep apnea        Past Surgical History:   Procedure Laterality Date    CATARACT REMOVAL WITH IMPLANT  2009, 2010    B eyes    CYSTOSCOPY Left 08/24/2021    CYSTOSCOPY, LEFT RETROGRADE PYELOGRAM, PLACEMENT OF LEFT URETERAL STENT performed by Blaise Ritchie MD at [facility] OR    CYSTOSCOPY Left 09/02/2021    CYSTOSCOPY WITH LEFT URETEROSCOPY WITH HOLMIUM LASER LITHOTRIPSY, STONE MANIPULATION AND LEFT STENT REMOVAL performed by Blaise Ritchie MD at [facility] OR    CYSTOSCOPY Left 04/09/2022    CYSTOSCOPY LEFT URETERAL STENT INSERTION performed by Blaise Ritchie MD at [facility] OR    CYSTOSCOPY Left 06/25/2022    CYSTOSCOPY  LEFT URETERAL STENT INSERTION performed by Red Estrada MD at [facility] OR    KIDNEY STONE SURGERY  06/2022    3 surgeries ( last surgery was 8/2022)    TOOTH EXTRACTION  08/2018       Social History     Socioeconomic

## 2024-04-19 ENCOUNTER — HOSPITAL ENCOUNTER (OUTPATIENT)
Age: 59
Discharge: HOME | End: 2024-04-19
Payer: COMMERCIAL

## 2024-04-19 DIAGNOSIS — I12.9 TYPE 2 DM WITH CKD STAGE 2 AND HYPERTENSION (HCC): ICD-10-CM

## 2024-04-19 DIAGNOSIS — E66.01 SEVERE OBESITY (BMI 35.0-39.9) WITH COMORBIDITY (HCC): ICD-10-CM

## 2024-04-19 DIAGNOSIS — N17.9 ACUTE KIDNEY INJURY SUPERIMPOSED ON CKD (HCC): ICD-10-CM

## 2024-04-19 DIAGNOSIS — N20.0 CALCULUS OF KIDNEY: ICD-10-CM

## 2024-04-19 DIAGNOSIS — E11.9 TYPE 2 DIABETES MELLITUS WITHOUT COMPLICATION, WITHOUT LONG-TERM CURRENT USE OF INSULIN (HCC): ICD-10-CM

## 2024-04-19 DIAGNOSIS — R80.1 PERSISTENT PROTEINURIA: ICD-10-CM

## 2024-04-19 DIAGNOSIS — N18.2 CKD (CHRONIC KIDNEY DISEASE), STAGE II: ICD-10-CM

## 2024-04-19 DIAGNOSIS — E11.22 TYPE 2 DM WITH CKD STAGE 2 AND HYPERTENSION (HCC): ICD-10-CM

## 2024-04-19 DIAGNOSIS — N18.2 TYPE 2 DM WITH CKD STAGE 2 AND HYPERTENSION (HCC): ICD-10-CM

## 2024-04-19 DIAGNOSIS — I10 ESSENTIAL HYPERTENSION: ICD-10-CM

## 2024-04-19 DIAGNOSIS — N18.9 ACUTE KIDNEY INJURY SUPERIMPOSED ON CKD (HCC): ICD-10-CM

## 2024-04-19 LAB
25(OH)D3 SERPL-MCNC: 21.9 NG/ML
ALBUMIN SERPL-MCNC: 4.2 G/DL (ref 3.4–5)
ALBUMIN SERPL-MCNC: 4.2 G/DL (ref 3.4–5)
ALBUMIN/GLOB SERPL: 1.4 {RATIO} (ref 1.1–2.2)
ALP SERPL-CCNC: 58 U/L (ref 40–129)
ALT SERPL-CCNC: 32 U/L (ref 10–40)
ANION GAP SERPL CALCULATED.3IONS-SCNC: 10 MMOL/L (ref 3–16)
ANION GAP SERPL CALCULATED.3IONS-SCNC: 11 MMOL/L (ref 3–16)
AST SERPL-CCNC: 27 U/L (ref 15–37)
BACTERIA URNS QL MICRO: ABNORMAL /HPF
BILIRUB SERPL-MCNC: 0.6 MG/DL (ref 0–1)
BILIRUB UR QL STRIP.AUTO: NEGATIVE
BUN SERPL-MCNC: 26 MG/DL (ref 7–20)
BUN SERPL-MCNC: 26 MG/DL (ref 7–20)
CALCIUM SERPL-MCNC: 9.3 MG/DL (ref 8.3–10.6)
CALCIUM SERPL-MCNC: 9.5 MG/DL (ref 8.3–10.6)
CHLORIDE SERPL-SCNC: 104 MMOL/L (ref 99–110)
CHLORIDE SERPL-SCNC: 104 MMOL/L (ref 99–110)
CHOLEST SERPL-MCNC: 155 MG/DL (ref 0–199)
CLARITY UR: CLEAR
CO2 SERPL-SCNC: 27 MMOL/L (ref 21–32)
CO2 SERPL-SCNC: 27 MMOL/L (ref 21–32)
COLOR UR: YELLOW
CREAT SERPL-MCNC: 1.1 MG/DL (ref 0.9–1.3)
CREAT SERPL-MCNC: 1.2 MG/DL (ref 0.9–1.3)
CREAT UR-MCNC: 91.3 MG/DL (ref 39–259)
DEPRECATED RDW RBC AUTO: 14.8 % (ref 12.4–15.4)
EPI CELLS #/AREA URNS AUTO: 1 /HPF (ref 0–5)
GFR SERPLBLD CREATININE-BSD FMLA CKD-EPI: 71 ML/MIN/{1.73_M2}
GFR SERPLBLD CREATININE-BSD FMLA CKD-EPI: 78 ML/MIN/{1.73_M2}
GLUCOSE SERPL-MCNC: 98 MG/DL (ref 70–99)
GLUCOSE SERPL-MCNC: 99 MG/DL (ref 70–99)
GLUCOSE UR STRIP.AUTO-MCNC: NEGATIVE MG/DL
HCT VFR BLD AUTO: 46.4 % (ref 40.5–52.5)
HDLC SERPL-MCNC: 38 MG/DL (ref 40–60)
HGB BLD-MCNC: 16.1 G/DL (ref 13.5–17.5)
HGB UR QL STRIP.AUTO: ABNORMAL
HYALINE CASTS #/AREA URNS AUTO: 0 /LPF (ref 0–8)
KETONES UR STRIP.AUTO-MCNC: NEGATIVE MG/DL
LDLC SERPL CALC-MCNC: 97 MG/DL
LEUKOCYTE ESTERASE UR QL STRIP.AUTO: NEGATIVE
MCH RBC QN AUTO: 33.3 PG (ref 26–34)
MCHC RBC AUTO-ENTMCNC: 34.6 G/DL (ref 31–36)
MCV RBC AUTO: 96.2 FL (ref 80–100)
MICROALBUMIN UR DL<=1MG/L-MCNC: 6.9 MG/DL
MICROALBUMIN/CREAT UR: 75.6 MG/G (ref 0–30)
NITRITE UR QL STRIP.AUTO: NEGATIVE
PH UR STRIP.AUTO: 5.5 [PH] (ref 5–8)
PHOSPHATE SERPL-MCNC: 3 MG/DL (ref 2.5–4.9)
PLATELET # BLD AUTO: 210 K/UL (ref 135–450)
PMV BLD AUTO: 9.2 FL (ref 5–10.5)
POTASSIUM SERPL-SCNC: 4.6 MMOL/L (ref 3.5–5.1)
POTASSIUM SERPL-SCNC: 4.6 MMOL/L (ref 3.5–5.1)
PROT SERPL-MCNC: 7.3 G/DL (ref 6.4–8.2)
PROT UR STRIP.AUTO-MCNC: NEGATIVE MG/DL
PROT UR-MCNC: 14 MG/DL
PROT/CREAT UR-RTO: 0.2 MG/DL
PTH-INTACT SERPL-MCNC: 25 PG/ML (ref 14–72)
RBC # BLD AUTO: 4.82 M/UL (ref 4.2–5.9)
RBC CLUMPS #/AREA URNS AUTO: 2 /HPF (ref 0–4)
SODIUM SERPL-SCNC: 141 MMOL/L (ref 136–145)
SODIUM SERPL-SCNC: 142 MMOL/L (ref 136–145)
SP GR UR STRIP.AUTO: 1.01 (ref 1–1.03)
TRIGL SERPL-MCNC: 101 MG/DL (ref 0–150)
UA DIPSTICK W REFLEX MICRO PNL UR: YES
URN SPEC COLLECT METH UR: ABNORMAL
UROBILINOGEN UR STRIP-ACNC: 0.2 E.U./DL
VLDLC SERPL CALC-MCNC: 20 MG/DL
WBC # BLD AUTO: 6.7 K/UL (ref 4–11)
WBC #/AREA URNS AUTO: 0 /HPF (ref 0–5)

## 2024-04-19 PROCEDURE — 80053 COMPREHEN METABOLIC PANEL: CPT

## 2024-04-19 PROCEDURE — 84156 ASSAY OF PROTEIN URINE: CPT

## 2024-04-19 PROCEDURE — 82306 VITAMIN D 25 HYDROXY: CPT

## 2024-04-19 PROCEDURE — 81001 URINALYSIS AUTO W/SCOPE: CPT

## 2024-04-19 PROCEDURE — 82043 UR ALBUMIN QUANTITATIVE: CPT

## 2024-04-19 PROCEDURE — 36415 COLL VENOUS BLD VENIPUNCTURE: CPT

## 2024-04-19 PROCEDURE — 83970 ASSAY OF PARATHORMONE: CPT

## 2024-04-19 PROCEDURE — 83036 HEMOGLOBIN GLYCOSYLATED A1C: CPT

## 2024-04-19 PROCEDURE — 80061 LIPID PANEL: CPT

## 2024-04-19 PROCEDURE — 85027 COMPLETE CBC AUTOMATED: CPT

## 2024-04-19 PROCEDURE — 82570 ASSAY OF URINE CREATININE: CPT

## 2024-04-20 LAB
EST. AVERAGE GLUCOSE BLD GHB EST-MCNC: 114 MG/DL
HBA1C MFR BLD: 5.6 %

## 2024-04-23 ENCOUNTER — TELEPHONE (OUTPATIENT)
Dept: ORTHOPEDIC SURGERY | Age: 59
End: 2024-04-23

## 2024-04-23 NOTE — TELEPHONE ENCOUNTER
Sherry Luna [facility] Clinical Support  C9 APPROVED FOR PHYSICAL THERAPY 2-3 X'S WEEK FOR 4-6 WEEKS 12 VISITS 04-- 06-.    LVM for patient stating that the C-9 for PT was approved.

## 2024-05-10 ENCOUNTER — HOSPITAL ENCOUNTER (OUTPATIENT)
Dept: ULTRASOUND IMAGING | Age: 59
Discharge: HOME | End: 2024-05-10
Payer: COMMERCIAL

## 2024-05-10 DIAGNOSIS — N20.0 CALCULUS OF KIDNEY: ICD-10-CM

## 2024-05-10 DIAGNOSIS — E11.22 TYPE 2 DM WITH CKD STAGE 2 AND HYPERTENSION (HCC): ICD-10-CM

## 2024-05-10 DIAGNOSIS — N18.2 TYPE 2 DM WITH CKD STAGE 2 AND HYPERTENSION (HCC): ICD-10-CM

## 2024-05-10 DIAGNOSIS — I10 ESSENTIAL HYPERTENSION: ICD-10-CM

## 2024-05-10 DIAGNOSIS — I12.9 TYPE 2 DM WITH CKD STAGE 2 AND HYPERTENSION (HCC): ICD-10-CM

## 2024-05-10 DIAGNOSIS — N18.2 CKD (CHRONIC KIDNEY DISEASE), STAGE II: ICD-10-CM

## 2024-05-10 DIAGNOSIS — R80.1 PERSISTENT PROTEINURIA: ICD-10-CM

## 2024-05-10 DIAGNOSIS — E66.01 SEVERE OBESITY (BMI 35.0-39.9) WITH COMORBIDITY (HCC): ICD-10-CM

## 2024-05-10 PROCEDURE — 76770 US EXAM ABDO BACK WALL COMP: CPT

## 2024-05-11 ENCOUNTER — HOSPITAL ENCOUNTER (OUTPATIENT)
Dept: PHYSICAL THERAPY | Age: 59
Setting detail: THERAPIES SERIES
Discharge: HOME | End: 2024-05-11
Attending: ORTHOPAEDIC SURGERY
Payer: COMMERCIAL

## 2024-05-11 PROCEDURE — 97164 PT RE-EVAL EST PLAN CARE: CPT

## 2024-05-11 PROCEDURE — 97110 THERAPEUTIC EXERCISES: CPT

## 2024-05-11 NOTE — PLAN OF CARE
[facility] - Outpatient Rehabilitation and Therapy [address], [place] [address] office: [phone] fax: [phone]    Physical Therapy Re-Certification Plan of Care    Dear Meryl Pepe MD  ,    We had the pleasure of treating the following patient for physical therapy services at [facility]. A summary of our findings can be found in the updated assessment below.  This includes our plan of care.  If you have any questions or concerns regarding these findings, please do not hesitate to contact me at the office phone number checked above.  Thank you for the referral.     Physician Signature:________________________________Date:__________________  By signing above (or electronic signature), therapist's plan is approved by physician      Functional Outcome: Quick DASH 24 pts, 30%     Overall Response to Treatment:  RE-Eval this date    Total Visits: 18     Recommendation:    [x] Continue PT 2x / wk for 6 weeks.   [] Hold PT, pending MD visit   [] Discharge to [facility]. Follow up with PT or MD PRN.      Physical Therapy: TREATMENT/PROGRESS NOTE   Patient: Toño Grewal (58 y.o. male)   Treatment Date: 2024   :  1965 MRN: [id number]   Visit #: + 12  Insurance Allowable Auth Needed   18 approved (24 - 3/30/24)    12 approved  24-24 [x]Yes    []No  Workers comp    Elayne  Fax [phone]    Insurance: Payor: [employer] / Plan: [employer] / Product Type: *No Product type* /   Insurance ID: [id number] - (Worker's Comp)  Secondary Insurance (if applicable):    Treatment Diagnosis:       ICD-10-CM      1. Decreased functional activity tolerance  R68.89         2. Shoulder weakness  R29.898        Medical Diagnosis:    Nondisplaced fracture of sternal end of right clavicle, initial encounter for closed fracture [S42.017A]   Referring Physician: Meryl Pepe MD  PCP: Candido Colón MD           - - -

## 2024-05-13 ENCOUNTER — APPOINTMENT (OUTPATIENT)
Dept: PHYSICAL THERAPY | Age: 59
End: 2024-05-13
Attending: ORTHOPAEDIC SURGERY
Payer: COMMERCIAL

## 2024-05-14 ENCOUNTER — OFFICE VISIT (OUTPATIENT)
Dept: FAMILY MEDICINE CLINIC | Age: 59
End: 2024-05-14
Payer: COMMERCIAL

## 2024-05-14 VITALS
HEART RATE: 98 BPM | BODY MASS INDEX: 36.4 KG/M2 | OXYGEN SATURATION: 98 % | TEMPERATURE: 98 F | WEIGHT: 246.5 LBS | DIASTOLIC BLOOD PRESSURE: 82 MMHG | SYSTOLIC BLOOD PRESSURE: 124 MMHG | RESPIRATION RATE: 12 BRPM

## 2024-05-14 DIAGNOSIS — M10.9 GOUT, UNSPECIFIED CAUSE, UNSPECIFIED CHRONICITY, UNSPECIFIED SITE: ICD-10-CM

## 2024-05-14 DIAGNOSIS — R80.9 TYPE 2 DIABETES MELLITUS WITH MICROALBUMINURIA, WITHOUT LONG-TERM CURRENT USE OF INSULIN (HCC): Primary | ICD-10-CM

## 2024-05-14 DIAGNOSIS — I10 ESSENTIAL HYPERTENSION: ICD-10-CM

## 2024-05-14 DIAGNOSIS — E11.29 TYPE 2 DIABETES MELLITUS WITH MICROALBUMINURIA, WITHOUT LONG-TERM CURRENT USE OF INSULIN (HCC): Primary | ICD-10-CM

## 2024-05-14 DIAGNOSIS — E66.01 SEVERE OBESITY (BMI 35.0-39.9) WITH COMORBIDITY (HCC): ICD-10-CM

## 2024-05-14 DIAGNOSIS — N17.9 AKI (ACUTE KIDNEY INJURY) (HCC): ICD-10-CM

## 2024-05-14 DIAGNOSIS — E55.9 VITAMIN D DEFICIENCY: ICD-10-CM

## 2024-05-14 PROCEDURE — 3044F HG A1C LEVEL LT 7.0%: CPT | Performed by: FAMILY MEDICINE

## 2024-05-14 PROCEDURE — 3074F SYST BP LT 130 MM HG: CPT | Performed by: FAMILY MEDICINE

## 2024-05-14 PROCEDURE — 99214 OFFICE O/P EST MOD 30 MIN: CPT | Performed by: FAMILY MEDICINE

## 2024-05-14 PROCEDURE — 3079F DIAST BP 80-89 MM HG: CPT | Performed by: FAMILY MEDICINE

## 2024-05-14 RX ORDER — AMLODIPINE BESYLATE 5 MG/1
5 TABLET ORAL DAILY
Qty: 135 TABLET | Refills: 3 | Status: SHIPPED | OUTPATIENT
Start: 2024-05-14

## 2024-05-14 RX ORDER — GLIMEPIRIDE 1 MG/1
TABLET ORAL
Qty: 90 TABLET | Refills: 1 | Status: SHIPPED | OUTPATIENT
Start: 2024-05-14

## 2024-05-14 RX ORDER — VALSARTAN 80 MG/1
80 TABLET ORAL DAILY
Qty: 90 TABLET | Refills: 1 | Status: SHIPPED | OUTPATIENT
Start: 2024-05-14

## 2024-05-14 RX ORDER — METFORMIN HYDROCHLORIDE 500 MG/1
TABLET, EXTENDED RELEASE ORAL
Qty: 90 TABLET | Refills: 1 | Status: SHIPPED | OUTPATIENT
Start: 2024-05-14

## 2024-05-14 RX ORDER — ALLOPURINOL 100 MG/1
100 TABLET ORAL DAILY
Qty: 90 TABLET | Refills: 1 | Status: SHIPPED | OUTPATIENT
Start: 2024-05-14

## 2024-05-14 SDOH — ECONOMIC STABILITY: FOOD INSECURITY: WITHIN THE PAST 12 MONTHS, THE FOOD YOU BOUGHT JUST DIDN'T LAST AND YOU DIDN'T HAVE MONEY TO GET MORE.: NEVER TRUE

## 2024-05-14 SDOH — ECONOMIC STABILITY: FOOD INSECURITY: WITHIN THE PAST 12 MONTHS, YOU WORRIED THAT YOUR FOOD WOULD RUN OUT BEFORE YOU GOT MONEY TO BUY MORE.: NEVER TRUE

## 2024-05-14 SDOH — ECONOMIC STABILITY: INCOME INSECURITY: HOW HARD IS IT FOR YOU TO PAY FOR THE VERY BASICS LIKE FOOD, HOUSING, MEDICAL CARE, AND HEATING?: NOT HARD AT ALL

## 2024-05-14 NOTE — PROGRESS NOTES
Subjective   Patient ID: Toño Grewal is a 58 y.o. male.  CC: Patient presents for re-evaluation of chronic health problems including diabetes mellitus, gout, hypertension, acute kidney injury secondary to nephrolithiasis..    HPI Pt is  here for a follow up, med refill, test results.  Patient states he is doing better at this point of time.  He had kidney stones removed in February as he developed acute renal failure secondary to nephrolithiasis.  The hydronephrosis improved postoperatively and kidney function appears to return to normal.  He has not had any gout flareups for some time and maintains gout medication.  He is concerned about taking too many medications but has been compliant with his current medications.    Eye exam current (within one year): No    Checks sugars at home: yes  Home blood sugar records: patient tests 1 time(s) per day  Any episodes of hypoglycemia? Yes    Current medication use: taking as prescribed  Medication side effects: none     Current diet: well balanced  Current exercise:very active     Review of Systems     Patient Active Problem List   Diagnosis    Essential hypertension    Obesity    Sleep apnea    Type 2 diabetes mellitus with microalbuminuria, without long-term current use of insulin ([facility])    Mild intermittent asthma without complication    Carpal tunnel syndrome of right wrist    Nephrolithiasis    ONOFRE (acute kidney injury) ([facility])    Severe obesity (BMI 35.0-39.9) with comorbidity (HCC)    Closed nondisplaced fracture of sternal end of right clavicle    Renal stone       Outpatient Medications Marked as Taking for the 5/14/24 encounter (Office Visit) with Candido Colón MD   Medication Sig Dispense Refill    Multiple Vitamins-Minerals (CENTRUM/CERTA-MARLA WITH MINERALS ORAL) solution Take 15 mLs by mouth daily      naproxen (NAPROSYN) 500 MG tablet Take 1 tablet by mouth 2 times daily (with meals) for 10 days 20 tablet 0    ondansetron (ZOFRAN) 4 MG tablet Take 1

## 2024-05-15 ENCOUNTER — HOSPITAL ENCOUNTER (OUTPATIENT)
Dept: PHYSICAL THERAPY | Age: 59
Setting detail: THERAPIES SERIES
Discharge: HOME | End: 2024-05-15
Attending: ORTHOPAEDIC SURGERY
Payer: COMMERCIAL

## 2024-05-15 PROCEDURE — 97112 NEUROMUSCULAR REEDUCATION: CPT

## 2024-05-15 PROCEDURE — 97140 MANUAL THERAPY 1/> REGIONS: CPT

## 2024-05-15 PROCEDURE — 97110 THERAPEUTIC EXERCISES: CPT

## 2024-05-15 NOTE — FLOWSHEET NOTE
[facility] - Outpatient Rehabilitation and Therapy [address], [place] [address] office: [phone] fax: [phone]      Physical Therapy: TREATMENT/PROGRESS NOTE   Patient: Toño Grewal (58 y.o. male)   Treatment Date: 05/15/2024   :  1965 MRN: [id number]   Visit #: + 12  Insurance Allowable Auth Needed   18 approved (24 - 3/30/24)     approved  24-24 [x]Yes    []No  Workers comp    Elayne  Fax [phone]    Insurance: Payor: [employer] / Plan: [employer] / Product Type: *No Product type* /   Insurance ID: [id number] - (Worker's Comp)  Secondary Insurance (if applicable):    Treatment Diagnosis:       ICD-10-CM      1. Decreased functional activity tolerance  R68.89         2. Shoulder weakness  R29.898        Medical Diagnosis:    Nondisplaced fracture of sternal end of right clavicle, initial encounter for closed fracture [S42.017A]   Referring Physician: Meryl Pepe MD  PCP: Candido Colón MD                             Plan of care signed (Y/N): Y    Date of Patient follow up with Physician: 24     Progress Report/POC: YES, Date Range for this report: 24 to 24  (RE-Eval)  POC update due: (10 visits /OR AUTH LIMITS, whichever is less)    Precautions/ Contra-indications:                                                                                          Latex allergy:  NO  Pacemaker:    NO  Contraindications for Manipulation: None  Date of Surgery: none  Other:     Preferred Language for Healthcare:   [x]English       []other:    SUBJECTIVE EXAMINATION     Patient Report/Comments:  Pt rates R SHLD pain /10 with c/o moderate stiffness and ache 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          Test used Initial score  23  POC 24  Re Eval 05/15/2024   Pain Summary VAS 3/10 1/10 4/10    6/10 worst in past week  4/10   Functional questionnaire Quick DASH %

## 2024-05-20 ENCOUNTER — HOSPITAL ENCOUNTER (OUTPATIENT)
Dept: PHYSICAL THERAPY | Age: 59
Setting detail: THERAPIES SERIES
Discharge: HOME | End: 2024-05-20
Attending: ORTHOPAEDIC SURGERY
Payer: COMMERCIAL

## 2024-05-20 PROCEDURE — 97110 THERAPEUTIC EXERCISES: CPT

## 2024-05-20 PROCEDURE — 97112 NEUROMUSCULAR REEDUCATION: CPT

## 2024-05-20 NOTE — FLOWSHEET NOTE
[facility] - Outpatient Rehabilitation and Therapy [address], [place] [address] office: [phone] fax: [phone]      Physical Therapy: TREATMENT/PROGRESS NOTE   Patient: Toño Grewal (59 y.o. male)   Treatment Date: 2024   :  1965 MRN: [id number]   Visit #: + 12  Insurance Allowable Auth Needed   18 approved (24 - 3/30/24)    3/12 approved  24-24 [x]Yes    []No  Workers comp    Elayne  Fax [phone]    Insurance: Payor: [employer] / Plan: [employer] / Product Type: *No Product type* /   Insurance ID: [id number] - (Worker's Comp)  Secondary Insurance (if applicable):    Treatment Diagnosis:       ICD-10-CM      1. Decreased functional activity tolerance  R68.89         2. Shoulder weakness  R29.898        Medical Diagnosis:    Nondisplaced fracture of sternal end of right clavicle, initial encounter for closed fracture [S42.017A]   Referring Physician: Meryl Pepe MD  PCP: Candido Colón MD                             Plan of care signed (Y/N): Y    Date of Patient follow up with Physician: 24     Progress Report/POC: NO  POC update due: (10 visits /OR AUTH LIMITS, whichever is less)    Precautions/ Contra-indications:                                                                                          Latex allergy:  NO  Pacemaker:    NO  Contraindications for Manipulation: None  Date of Surgery: none  Other:     Preferred Language for Healthcare:   [x]English       []other:    SUBJECTIVE EXAMINATION     Patient Report/Comments:  Pt reports pain down right lateral arm to elbow. Recalls it can be more irritated with pushing up out of a chair. Occasional clicking and popping can be painful but not always.     Test used Initial score  23  POC 24  Re Eval 2024   Pain Summary VAS 3/10 1/10 4/10    6/10 worst in past week 5/10   Functional questionnaire Quick DASH % 19

## 2024-05-22 ENCOUNTER — HOSPITAL ENCOUNTER (OUTPATIENT)
Dept: PHYSICAL THERAPY | Age: 59
Setting detail: THERAPIES SERIES
Discharge: HOME | End: 2024-05-22
Attending: ORTHOPAEDIC SURGERY
Payer: COMMERCIAL

## 2024-05-22 PROCEDURE — 97110 THERAPEUTIC EXERCISES: CPT

## 2024-05-22 PROCEDURE — 97112 NEUROMUSCULAR REEDUCATION: CPT

## 2024-05-22 NOTE — FLOWSHEET NOTE
[facility] - Outpatient Rehabilitation and Therapy [address], [place] [address] office: [phone] fax: [phone]      Physical Therapy: TREATMENT/PROGRESS NOTE   Patient: Toño Grewal (59 y.o. male)   Treatment Date: 2024   :  1965 MRN: [id number]   Visit #: + 12  Insurance Allowable Auth Needed   18 approved (24 - 3/30/24)    3/12 approved  24-24 [x]Yes    []No  Workers comp    Elayne  Fax [phone]    Insurance: Payor: [employer] / Plan: [employer] / Product Type: *No Product type* /   Insurance ID: [id number] - (Worker's Comp)  Secondary Insurance (if applicable):    Treatment Diagnosis:       ICD-10-CM      1. Decreased functional activity tolerance  R68.89         2. Shoulder weakness  R29.898        Medical Diagnosis:    No admission diagnoses are documented for this encounter.   Referring Physician: Meryl Pepe MD  PCP: Candido Colón MD                             Plan of care signed (Y/N): Y    Date of Patient follow up with Physician: 24     Progress Report/POC: NO  POC update due: (10 visits /OR AUTH LIMITS, whichever is less)    Precautions/ Contra-indications:                                                                                          Latex allergy:  NO  Pacemaker:    NO  Contraindications for Manipulation: None  Date of Surgery: none  Other:     Preferred Language for Healthcare:   [x]English       []other:    SUBJECTIVE EXAMINATION     Patient Report/Comments:  Pt c/o 10 superolateral R SHLD pain currently.        Test used Initial score  23  POC 24  Re Eval 2024   Pain Summary VAS 3/10 1/10 4/10    6/10 worst in past week 10   Functional questionnaire Quick DASH 30% 19 / 18.18%  %    Other:                  Pain:  Patient Scale: VAS: 4/10 R shoulder  Pain location: R shoulder  Patient describes pain to be constant and aching; has

## 2024-05-29 ENCOUNTER — HOSPITAL ENCOUNTER (OUTPATIENT)
Dept: PHYSICAL THERAPY | Age: 59
Setting detail: THERAPIES SERIES
Discharge: HOME | End: 2024-05-29
Attending: ORTHOPAEDIC SURGERY
Payer: COMMERCIAL

## 2024-05-29 PROCEDURE — 97112 NEUROMUSCULAR REEDUCATION: CPT

## 2024-05-29 PROCEDURE — 97110 THERAPEUTIC EXERCISES: CPT

## 2024-05-29 NOTE — FLOWSHEET NOTE
proper muscle and joint use in functional mobility, ADLs and prior level of function   Status: [] Progressing: [] Met: [] Not Met: [] Adjusted  Patient will return to Reaching activities without increased symptoms or restriction to work towards return to prior level of function.                               Status: [] Progressing: [] Met: [] Not Met: [] Adjusted  Patient will increase UE function to return to work with regular duties.   Status: [] Progressing: [] Met: [] Not Met: [] Adjusted  6.    Patient will be able to manage symptoms while resuming full duty at work.                                                                                                                    Status: [] Progressing: [] Met: [] Not Met: [] Adjusted      Overall Progression Towards Functional goals/ Treatment Progress Update:  [x] Patient is progressing as expected towards functional goals listed.    [] Progression is slowed due to complexities/Impairments listed.  [] Progression has been slowed due to co-morbidities.  [] Plan just implemented, too soon (<30days) to assess goals progression   [] Goals require adjustment due to lack of progress  [] Patient is not progressing as expected and requires additional follow up with physician  [] Other:     CHARGE CAPTURE     PT WORKERS COMP GRID:   CPT Code (TIMED) # Time In Time Out Total Time CPT Code (UNTIMED) #    Therex (29592)  2 530 555 25 Eval     Neuromusc. Re-ed (35654) 1 555 610 15  Re-Eval (43122)     Manual (56145)      Estim Unattended (08449)     Ther. Act (96438)      Mech. Traction (84174)     Gait (24034)      Dry Needle 1-2 muscle (20560)     Aquatic Therex (01920)      Dry Needle 3+ muscle (20561)     Iontophoresis (55149)      VASO (33371)     Ultrasound (89932)      Group Therapy (94751)     Estim Attended (04638)      Canalith Repositioning (71529)     Other:      Other:    Totals   3 530 610 40       Total Treatment Minutes 40        Charge

## 2024-05-30 ENCOUNTER — TELEPHONE (OUTPATIENT)
Dept: ADMINISTRATIVE | Age: 59
End: 2024-05-30

## 2024-05-30 NOTE — TELEPHONE ENCOUNTER
Received a PA request for Dexcom G6 Sensor.  I don't see a current script in the chart for this product.  If patient is still using it, I will new a new script in order to do the PA.    Please respond to the pool ( P MHCX PSC MEDICATION PRE-AUTH).      Thank you!

## 2024-06-01 ENCOUNTER — HOSPITAL ENCOUNTER (OUTPATIENT)
Dept: PHYSICAL THERAPY | Age: 59
Setting detail: THERAPIES SERIES
Discharge: HOME | End: 2024-06-01
Attending: ORTHOPAEDIC SURGERY
Payer: COMMERCIAL

## 2024-06-01 PROCEDURE — 97110 THERAPEUTIC EXERCISES: CPT

## 2024-06-01 PROCEDURE — 97112 NEUROMUSCULAR REEDUCATION: CPT

## 2024-06-01 NOTE — FLOWSHEET NOTE
Goals: To be achieved in: 6weeks  Disability index score of 15% or less for the Quick DASH to assist with return to prior level of function.                         Status: [] Progressing: [] Met: [] Not Met: [] Adjusted  LLE AROM = RLE AROM to allow for proper joint functioning as indicated by patients functional deficits.  Status: [] Progressing: [] Met: [] Not Met: [] Adjusted  Pt to improve strength to 4+/5 or better of rotator cuff to allow for proper muscle and joint use in functional mobility, ADLs and prior level of function   Status: [] Progressing: [] Met: [] Not Met: [] Adjusted  Patient will return to Reaching activities without increased symptoms or restriction to work towards return to prior level of function.                               Status: [] Progressing: [] Met: [] Not Met: [] Adjusted  Patient will increase UE function to return to work with regular duties.   Status: [] Progressing: [] Met: [] Not Met: [] Adjusted  6.    Patient will be able to manage symptoms while resuming full duty at work.                                                                                                                    Status: [] Progressing: [] Met: [] Not Met: [] Adjusted      Overall Progression Towards Functional goals/ Treatment Progress Update:  [x] Patient is progressing as expected towards functional goals listed.    [] Progression is slowed due to complexities/Impairments listed.  [] Progression has been slowed due to co-morbidities.  [] Plan just implemented, too soon (<30days) to assess goals progression   [] Goals require adjustment due to lack of progress  [] Patient is not progressing as expected and requires additional follow up with physician  [] Other:     CHARGE CAPTURE     PT WORKERS COMP GRID:   CPT Code (TIMED) # Time In Time Out Total Time CPT Code (UNTIMED) #    Therex (81193)  2 11:15 11:42 27 Eval     Neuromusc. Re-ed (54116) 1 11:42 12:00 18  Re-Eval (28434)     Manual (24263)

## 2024-06-03 ENCOUNTER — HOSPITAL ENCOUNTER (OUTPATIENT)
Dept: PHYSICAL THERAPY | Age: 59
Setting detail: THERAPIES SERIES
Discharge: HOME | End: 2024-06-03
Attending: ORTHOPAEDIC SURGERY
Payer: COMMERCIAL

## 2024-06-03 PROCEDURE — 97110 THERAPEUTIC EXERCISES: CPT

## 2024-06-03 PROCEDURE — 97112 NEUROMUSCULAR REEDUCATION: CPT

## 2024-06-03 NOTE — FLOWSHEET NOTE
[facility] - Outpatient Rehabilitation and Therapy [address], [place] [address] office: [phone] fax: [phone]      Physical Therapy: TREATMENT/PROGRESS NOTE   Patient: Toño Grewal (59 y.o. male)   Treatment Date: 2024   :  1965 MRN: [id number]   Visit #: + 12  Insurance Allowable Auth Needed   18 approved (24 - 3/30/24)     approved  24-24 [x]Yes    []No  Workers comp    Elayne  Fax [phone]    Insurance: Payor: [employer] / Plan: [employer] / Product Type: *No Product type* /   Insurance ID: [id number] - (Worker's Comp)  Secondary Insurance (if applicable):    Treatment Diagnosis:       ICD-10-CM      1. Decreased functional activity tolerance  R68.89         2. Shoulder weakness  R29.898        Medical Diagnosis:    Nondisplaced fracture of sternal end of right clavicle, initial encounter for closed fracture [S42.017A]   Referring Physician: Meryl Pepe MD  PCP: Candido Colón MD                             Plan of care signed (Y/N): Y    Date of Patient follow up with Physician: 24     Progress Report/POC: NO  POC update due: (10 visits /OR AUTH LIMITS, whichever is less) weekly    Precautions/ Contra-indications:                                                                                          Latex allergy:  NO  Pacemaker:    NO  Contraindications for Manipulation: None  Date of Surgery: none  Other:     Preferred Language for Healthcare:   [x]English       []other:    SUBJECTIVE EXAMINATION     Patient Report/Comments:  Pt reports having to 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     Test used Initial score  23  POC 24  Re Eval 2024   Pain Summary VAS 3/10 1/10 4/10    6/10 worst in past week 10   Functional questionnaire Quick

## 2024-06-05 ENCOUNTER — HOSPITAL ENCOUNTER (OUTPATIENT)
Dept: PHYSICAL THERAPY | Age: 59
Setting detail: THERAPIES SERIES
Discharge: HOME | End: 2024-06-05
Attending: ORTHOPAEDIC SURGERY
Payer: COMMERCIAL

## 2024-06-05 PROCEDURE — 97112 NEUROMUSCULAR REEDUCATION: CPT

## 2024-06-05 PROCEDURE — 97110 THERAPEUTIC EXERCISES: CPT

## 2024-06-05 NOTE — FLOWSHEET NOTE
Charge Justification:  (86506) THERAPEUTIC EXERCISE - Provided verbal/tactile cueing for activities related to strengthening, flexibility, endurance, ROM performed to prevent loss of range of motion, maintain or improve muscular strength or increase flexibility, following either an injury or surgery.   (28989) NEUROMUSCULAR RE-EDUCATION - Therapeutic procedure, 1 or more areas, each 15 minutes; neuromuscular reeducation of movement, balance, coordination, kinesthetic sense, posture, and/or proprioception for sitting and/or standing activities    TREATMENT PLAN   Plan: Cont POC- Continue emphasis/focus on exercise progression, improving proper muscle recruitment and activation/motor control patterns, and modulating pain. Next visit plan to progress weights, progress reps, and add new exercises     Electronically Signed by Isauro Luna PTA, ATC  Date: 06/05/2024     Note: If patient does not return for scheduled/recommended follow up visits, this note will serve as a discharge from care along with the most recent update on progress.

## 2024-06-08 ENCOUNTER — HOSPITAL ENCOUNTER (OUTPATIENT)
Dept: PHYSICAL THERAPY | Age: 59
Setting detail: THERAPIES SERIES
Discharge: HOME | End: 2024-06-08
Attending: ORTHOPAEDIC SURGERY
Payer: COMMERCIAL

## 2024-06-08 PROCEDURE — 97112 NEUROMUSCULAR REEDUCATION: CPT

## 2024-06-08 PROCEDURE — 97110 THERAPEUTIC EXERCISES: CPT

## 2024-06-08 NOTE — FLOWSHEET NOTE
muscle (81512)     Iontophoresis (54824)      VASO (51791)     Ultrasound (72474)      Group Therapy (78972)     Estim Attended (34190)      Canalith Repositioning (47020)     Other:      Other:    Totals   3 1115 1155 40         Total Treatment Minutes 40          Charge Justification:  (84220) THERAPEUTIC EXERCISE - Provided verbal/tactile cueing for activities related to strengthening, flexibility, endurance, ROM performed to prevent loss of range of motion, maintain or improve muscular strength or increase flexibility, following either an injury or surgery.   (18998) NEUROMUSCULAR RE-EDUCATION - Therapeutic procedure, 1 or more areas, each 15 minutes; neuromuscular reeducation of movement, balance, coordination, kinesthetic sense, posture, and/or proprioception for sitting and/or standing activities    TREATMENT PLAN   Plan: Cont POC- Continue emphasis/focus on exercise progression, improving proper muscle recruitment and activation/motor control patterns, and modulating pain. Next visit plan to progress weights, progress reps, and add new exercises     Electronically Signed by ALIX GAUDARRAMA PT, DPT, OMT-C   Date: 06/08/2024     Note: If patient does not return for scheduled/recommended follow up visits, this note will serve as a discharge from care along with the most recent update on progress.

## 2024-06-10 ENCOUNTER — HOSPITAL ENCOUNTER (OUTPATIENT)
Dept: PHYSICAL THERAPY | Age: 59
Setting detail: THERAPIES SERIES
Discharge: HOME | End: 2024-06-10
Attending: ORTHOPAEDIC SURGERY
Payer: COMMERCIAL

## 2024-06-10 PROCEDURE — 97530 THERAPEUTIC ACTIVITIES: CPT

## 2024-06-10 PROCEDURE — 97110 THERAPEUTIC EXERCISES: CPT

## 2024-06-10 NOTE — FLOWSHEET NOTE
[facility] - [facility] Therapy [address], [place] [address] office: [phone] fax: [phone]    Physical Therapy Re-Certification Plan of Care    Dear Meryl Pepe MD  ,    We had the pleasure of treating the following patient for physical therapy services at [facility]. A summary of our findings can be found in the updated assessment below.  This includes our plan of care.  If you have any questions or concerns regarding these findings, please do not hesitate to contact me at the office phone number checked above.  Thank you for the referral.     Physician Signature:________________________________Date:__________________  By signing above (or electronic signature), therapist's plan is approved by physician      Functional Outcome: QUICKDASH = 22.7%  Toño Grewal 1965 continues to present with functional deficits in ROM, joint mobility, strength symmetry, flexibility, eccentric control, and muscle activation  limiting ability with reaching overhead, carrying items, lifting items, pushing or pulling activity, ADLs, and heavy home activity .  During therapy this date, patient required visual cueing, verbal cueing, tactile cueing, muscle facilitation, modification of technique, progression of exercises and program, and manual interventions for exercise progression, improving proper muscle recruitment and activation/motor control patterns, modulating pain, increasing ROM, reduce/eliminate soft tissue swelling/inflammation/restriction, improving soft tissue extensibility, allowing for proper ROM, and static and dynamic balance. Patient will continue to benefit from ongoing evaluation and advanced clinical decision from a Physical Therapist to improve pain control, ROM, muscle strength, neuromuscular control, functional mobility, and ADL status to safely return to [facility] without symptoms or restrictions.    Overall Response to

## 2024-06-12 ENCOUNTER — HOSPITAL ENCOUNTER (OUTPATIENT)
Dept: PHYSICAL THERAPY | Age: 59
Setting detail: THERAPIES SERIES
Discharge: HOME | End: 2024-06-12
Attending: ORTHOPAEDIC SURGERY
Payer: COMMERCIAL

## 2024-06-12 PROCEDURE — 97530 THERAPEUTIC ACTIVITIES: CPT

## 2024-06-12 PROCEDURE — 97110 THERAPEUTIC EXERCISES: CPT

## 2024-06-12 NOTE — FLOWSHEET NOTE
supine  Shoulder Abd, sidelying  Shoulder IR  Shoulder ER   2#  DBL lime  3#  Blue   Lime           12/4   S/L ER  3#    SL abd 3#    ER walkouts purple    Pec stretch in doorway   5x10 sec    Horiz Abduction   Lime 2/29   Bilat resisted ER lime 2/29   IR S strap     Sleeper stretch   2 x 30\"    ER at 90 in scaption and 0 deg abd   2 lbs 2 10    TRX rows  3 15          Prone SHLD ext 3# 5/15   Prone row 4# 5/15   Prone HA 2# 5/15   Pushups plyos  3 10    Ys against wall 2 lbs 2 10    Seated thorcic ext   3'' x15    SL open books   3'' x10    Bicep S   10'' x5    Manual Intervention (46142)  TIME     PROM R shoulder   STM bicep/pec tendon/ lat/teres tendons  Thoracic jt mobility                     NMR re-education (87056) resistance Sets/time Reps CUES NEEDED   Serratus punches    Cw/ccw    Supine ABCs 5# bottoms up KB    SL abd    Banded wall slides orange    Standing cane SHLD flex 3/13   BOW (4-way) 1.1# 1 20 ea 3/13        Standing SHLD flex/scap 2# 2 10 ea 5/15   Body blade ER/IR  @ 90 Flex   20''  20'' X3  x3    CC PNF chops  4 plates  2 10 B                                Therapeutic Activity (83793)  Sets/time     Time spent on patient progress note and edu including PT services, POC, assessment findings, diagnosis, prognosis, expectations, goals, and answering patient questions.                                     Modalities:    No modalities applied this session; pt declined stating he would ice at home.      Education/Home Exercise Program: Patient HEP program created electronically.  Refer to [facility] access code: [email or web address]    Access Code: [email or web address]  URL: [email or web address]  Date: 02/29/2024  Prepared by: Katharina Marcum    Exercises  - Standing shoulder flexion wall slides  - 1-2 x daily - 7 x weekly - 1-2 sets - 10 reps  - Standing Shoulder Row with Anchored Resistance  - 1-2 x daily - 7 x weekly - 1-2 sets - 10 reps  - Seated Shoulder Inferior Glide  - 1-2 x daily - 7 x weekly - 1-2 sets - 3

## 2024-06-17 ENCOUNTER — HOSPITAL ENCOUNTER (OUTPATIENT)
Dept: PHYSICAL THERAPY | Age: 59
Setting detail: THERAPIES SERIES
End: 2024-06-17
Attending: ORTHOPAEDIC SURGERY
Payer: COMMERCIAL

## 2024-06-19 ENCOUNTER — APPOINTMENT (OUTPATIENT)
Dept: PHYSICAL THERAPY | Age: 59
End: 2024-06-19
Attending: ORTHOPAEDIC SURGERY
Payer: COMMERCIAL

## 2024-06-24 ENCOUNTER — HOSPITAL ENCOUNTER (OUTPATIENT)
Dept: PHYSICAL THERAPY | Age: 59
Setting detail: THERAPIES SERIES
End: 2024-06-24
Attending: ORTHOPAEDIC SURGERY
Payer: COMMERCIAL

## 2024-08-08 ENCOUNTER — OFFICE VISIT (OUTPATIENT)
Dept: ORTHOPEDIC SURGERY | Age: 59
End: 2024-08-08

## 2024-08-08 VITALS — BODY MASS INDEX: 36.43 KG/M2 | WEIGHT: 246 LBS | HEIGHT: 69 IN

## 2024-08-08 DIAGNOSIS — S42.017A CLOSED NONDISPLACED FRACTURE OF STERNAL END OF RIGHT CLAVICLE, INITIAL ENCOUNTER: Primary | ICD-10-CM

## 2024-08-08 NOTE — PROGRESS NOTES
otherwise nontender over the remainder of the extremity.  Range of motion is full right shoulder.  The skin overlying the right shoulder is intact without evidence of lesion, laceration or skin tenting.  Distal pulses are 2+ and symmetric bilaterally.  Sensation is grossly intact to light touch and symmetric bilaterally.    IMAGING:  Xrays dated today in office, 2 views of right clavicle were reviewed, and showed minimally displaced sternal end of the clavicle fracture.    IMPRESSION:  Right minimally displaced sternal and clavicle fracture.    PLAN:  I discussed that the overall alignment of this fracture is still good and healed well. I discussed with the patient that I think that he would really benefit from a course of physical therapy for further strengthening and stretching. An Rx for physical therapy was given to the patient. NSAIDs OTC. We will see him  back in 2 months at which time we will get a new xray of the right clavicle.      He can continue to work full duty.      Meryl Pepe MD

## 2024-09-06 ENCOUNTER — HOSPITAL ENCOUNTER (OUTPATIENT)
Age: 59
Discharge: HOME | End: 2024-09-06
Payer: COMMERCIAL

## 2024-09-06 DIAGNOSIS — R80.9 TYPE 2 DIABETES MELLITUS WITH MICROALBUMINURIA, WITHOUT LONG-TERM CURRENT USE OF INSULIN (HCC): ICD-10-CM

## 2024-09-06 DIAGNOSIS — M10.9 GOUT, UNSPECIFIED CAUSE, UNSPECIFIED CHRONICITY, UNSPECIFIED SITE: ICD-10-CM

## 2024-09-06 DIAGNOSIS — E11.29 TYPE 2 DIABETES MELLITUS WITH MICROALBUMINURIA, WITHOUT LONG-TERM CURRENT USE OF INSULIN (HCC): ICD-10-CM

## 2024-09-06 LAB
ALBUMIN SERPL-MCNC: 4.3 G/DL (ref 3.4–5)
ALBUMIN/GLOB SERPL: 1.5 {RATIO} (ref 1.1–2.2)
ALP SERPL-CCNC: 54 U/L (ref 40–129)
ALT SERPL-CCNC: 47 U/L (ref 10–40)
ANION GAP SERPL CALCULATED.3IONS-SCNC: 12 MMOL/L (ref 3–16)
AST SERPL-CCNC: 35 U/L (ref 15–37)
BACTERIA URNS QL MICRO: ABNORMAL /HPF
BILIRUB SERPL-MCNC: 0.7 MG/DL (ref 0–1)
BILIRUB UR QL STRIP.AUTO: NEGATIVE
BUN SERPL-MCNC: 24 MG/DL (ref 7–20)
CALCIUM SERPL-MCNC: 9.7 MG/DL (ref 8.3–10.6)
CHLORIDE SERPL-SCNC: 105 MMOL/L (ref 99–110)
CLARITY UR: CLEAR
CO2 SERPL-SCNC: 25 MMOL/L (ref 21–32)
COLOR UR: YELLOW
CREAT SERPL-MCNC: 1.1 MG/DL (ref 0.9–1.3)
CREAT UR-MCNC: 146 MG/DL (ref 39–259)
CREAT UR-MCNC: 147 MG/DL (ref 39–259)
DEPRECATED RDW RBC AUTO: 14.4 % (ref 12.4–15.4)
EPI CELLS #/AREA URNS AUTO: 1 /HPF (ref 0–5)
EST. AVERAGE GLUCOSE BLD GHB EST-MCNC: 151.3 MG/DL
GFR SERPLBLD CREATININE-BSD FMLA CKD-EPI: 78 ML/MIN/{1.73_M2}
GLUCOSE SERPL-MCNC: 142 MG/DL (ref 70–99)
GLUCOSE UR STRIP.AUTO-MCNC: NEGATIVE MG/DL
HBA1C MFR BLD: 6.9 %
HCT VFR BLD AUTO: 47.7 % (ref 40.5–52.5)
HGB BLD-MCNC: 16.4 G/DL (ref 13.5–17.5)
HGB UR QL STRIP.AUTO: NEGATIVE
HYALINE CASTS #/AREA URNS AUTO: 0 /LPF (ref 0–8)
KETONES UR STRIP.AUTO-MCNC: NEGATIVE MG/DL
LEUKOCYTE ESTERASE UR QL STRIP.AUTO: NEGATIVE
MCH RBC QN AUTO: 34 PG (ref 26–34)
MCHC RBC AUTO-ENTMCNC: 34.4 G/DL (ref 31–36)
MCV RBC AUTO: 99 FL (ref 80–100)
MICROALBUMIN UR DL<=1MG/L-MCNC: 6.66 MG/DL
MICROALBUMIN/CREAT UR: 45.6 MG/G (ref 0–30)
NITRITE UR QL STRIP.AUTO: NEGATIVE
PH UR STRIP.AUTO: 5.5 [PH] (ref 5–8)
PHOSPHATE SERPL-MCNC: 2.6 MG/DL (ref 2.5–4.9)
PLATELET # BLD AUTO: 212 K/UL (ref 135–450)
PMV BLD AUTO: 9.8 FL (ref 5–10.5)
POTASSIUM SERPL-SCNC: 4.5 MMOL/L (ref 3.5–5.1)
PROT SERPL-MCNC: 7.2 G/DL (ref 6.4–8.2)
PROT UR STRIP.AUTO-MCNC: ABNORMAL MG/DL
PROT UR-MCNC: 19.5 MG/DL
PROT/CREAT UR-RTO: 0.1 MG/DL
RBC # BLD AUTO: 4.82 M/UL (ref 4.2–5.9)
RBC CLUMPS #/AREA URNS AUTO: 0 /HPF (ref 0–4)
SODIUM SERPL-SCNC: 142 MMOL/L (ref 136–145)
SP GR UR STRIP.AUTO: 1.02 (ref 1–1.03)
UA DIPSTICK W REFLEX MICRO PNL UR: YES
URATE SERPL-MCNC: 7.5 MG/DL (ref 3.5–7.2)
URN SPEC COLLECT METH UR: ABNORMAL
UROBILINOGEN UR STRIP-ACNC: 0.2 E.U./DL
WBC # BLD AUTO: 7.9 K/UL (ref 4–11)
WBC #/AREA URNS AUTO: 0 /HPF (ref 0–5)

## 2024-09-06 PROCEDURE — 36415 COLL VENOUS BLD VENIPUNCTURE: CPT

## 2024-09-06 PROCEDURE — 82043 UR ALBUMIN QUANTITATIVE: CPT

## 2024-09-06 PROCEDURE — 84156 ASSAY OF PROTEIN URINE: CPT

## 2024-09-06 PROCEDURE — 84100 ASSAY OF PHOSPHORUS: CPT

## 2024-09-06 PROCEDURE — 80053 COMPREHEN METABOLIC PANEL: CPT

## 2024-09-06 PROCEDURE — 81001 URINALYSIS AUTO W/SCOPE: CPT

## 2024-09-06 PROCEDURE — 82570 ASSAY OF URINE CREATININE: CPT

## 2024-09-06 PROCEDURE — 85027 COMPLETE CBC AUTOMATED: CPT

## 2024-09-06 PROCEDURE — 84550 ASSAY OF BLOOD/URIC ACID: CPT

## 2024-09-06 PROCEDURE — 83036 HEMOGLOBIN GLYCOSYLATED A1C: CPT

## 2024-09-16 ENCOUNTER — TELEPHONE (OUTPATIENT)
Dept: ORTHOPEDIC SURGERY | Age: 59
End: 2024-09-16

## 2024-09-24 ENCOUNTER — TELEPHONE (OUTPATIENT)
Dept: ORTHOPEDIC SURGERY | Age: 59
End: 2024-09-24

## 2024-09-24 ENCOUNTER — OFFICE VISIT (OUTPATIENT)
Dept: FAMILY MEDICINE CLINIC | Age: 59
End: 2024-09-24
Payer: COMMERCIAL

## 2024-09-24 VITALS
TEMPERATURE: 98.2 F | RESPIRATION RATE: 12 BRPM | WEIGHT: 255.5 LBS | OXYGEN SATURATION: 98 % | BODY MASS INDEX: 37.73 KG/M2 | HEART RATE: 106 BPM | DIASTOLIC BLOOD PRESSURE: 82 MMHG | SYSTOLIC BLOOD PRESSURE: 136 MMHG

## 2024-09-24 DIAGNOSIS — I10 ESSENTIAL HYPERTENSION: ICD-10-CM

## 2024-09-24 DIAGNOSIS — R80.9 TYPE 2 DIABETES MELLITUS WITH MICROALBUMINURIA, WITHOUT LONG-TERM CURRENT USE OF INSULIN (HCC): Primary | ICD-10-CM

## 2024-09-24 DIAGNOSIS — M10.9 GOUT, UNSPECIFIED CAUSE, UNSPECIFIED CHRONICITY, UNSPECIFIED SITE: ICD-10-CM

## 2024-09-24 DIAGNOSIS — E11.29 TYPE 2 DIABETES MELLITUS WITH MICROALBUMINURIA, WITHOUT LONG-TERM CURRENT USE OF INSULIN (HCC): Primary | ICD-10-CM

## 2024-09-24 PROBLEM — N20.0 RENAL STONE: Status: RESOLVED | Noted: 2024-02-14 | Resolved: 2024-09-24

## 2024-09-24 PROBLEM — N17.9 AKI (ACUTE KIDNEY INJURY) (HCC): Status: RESOLVED | Noted: 2022-06-24 | Resolved: 2024-09-24

## 2024-09-24 PROCEDURE — 3044F HG A1C LEVEL LT 7.0%: CPT | Performed by: FAMILY MEDICINE

## 2024-09-24 PROCEDURE — 3079F DIAST BP 80-89 MM HG: CPT | Performed by: FAMILY MEDICINE

## 2024-09-24 PROCEDURE — 3075F SYST BP GE 130 - 139MM HG: CPT | Performed by: FAMILY MEDICINE

## 2024-09-24 PROCEDURE — 99214 OFFICE O/P EST MOD 30 MIN: CPT | Performed by: FAMILY MEDICINE

## 2024-09-24 RX ORDER — ALLOPURINOL 200 MG/1
200 TABLET ORAL DAILY
Qty: 90 TABLET | Refills: 3 | Status: SHIPPED | OUTPATIENT
Start: 2024-09-24

## 2024-09-24 RX ORDER — COLCHICINE 0.6 MG/1
0.6 TABLET ORAL 2 TIMES DAILY PRN
Qty: 30 TABLET | Refills: 3 | Status: SHIPPED | OUTPATIENT
Start: 2024-09-24

## 2024-10-08 ENCOUNTER — OFFICE VISIT (OUTPATIENT)
Dept: ORTHOPEDIC SURGERY | Age: 59
End: 2024-10-08

## 2024-10-08 VITALS — BODY MASS INDEX: 37.77 KG/M2 | WEIGHT: 255 LBS | HEIGHT: 69 IN

## 2024-10-08 DIAGNOSIS — S42.017A CLOSED NONDISPLACED FRACTURE OF STERNAL END OF RIGHT CLAVICLE, INITIAL ENCOUNTER: Primary | ICD-10-CM

## 2024-10-08 DIAGNOSIS — M75.101 TEAR OF RIGHT ROTATOR CUFF, UNSPECIFIED TEAR EXTENT, UNSPECIFIED WHETHER TRAUMATIC: ICD-10-CM

## 2024-10-08 RX ORDER — NAPROXEN 500 MG/1
500 TABLET ORAL 2 TIMES DAILY WITH MEALS
Qty: 60 TABLET | Refills: 0 | Status: SHIPPED | OUTPATIENT
Start: 2024-10-08 | End: 2024-11-07

## 2024-10-08 NOTE — PROGRESS NOTES
CHIEF COMPLAINT:   1-Right minimally displaced clavicle fracture.  2-Right shoulder pain    DATE OF INJURY: 10/5/2023, DOT: 10/5/2023, Worker's Comp.    HISTORY:  Mr. Grewal is a 59 y.o.  male right handed who presents today for follow up evaluation of a right shoulder injury.  The patient reports that this injury occurred when he was pulling on a machine really hard when it became loose and he heard a pop in his shoulder and then had difficulty moving it without pain.  He was first seen and evaluated in [facility] ER, when he was x-rayed and splinted, and asked to f/u with Orthopedics. The patient denies any other injuries. He rates his pain a 4/10 VAS and is worse with certain movements out extended or overhead. Resting makes the pain better. He reports that his pain is not improving and he sleeps in the recliner several days a weeks d/t shoulder pain. He has difficulty lifting overhead. No numbness or tingling sensation.  He works as a  requiring heavy duty activities and back to work.We had reordered the PT, but this was denied by [facility].   Denies smoking.    Past Medical History:   Diagnosis Date    Diabetes mellitus (HCC)     Hypertension     Obesity, unspecified     MARIA A on CPAP     Other abnormal blood chemistry     Seasonal allergies     Unspecified sleep apnea        Past Surgical History:   Procedure Laterality Date    CATARACT REMOVAL WITH IMPLANT  2009, 2010    B eyes    CYSTOSCOPY Left 08/24/2021    CYSTOSCOPY, LEFT RETROGRADE PYELOGRAM, PLACEMENT OF LEFT URETERAL STENT performed by Blaise Ritchie MD at [facility] OR    CYSTOSCOPY Left 09/02/2021    CYSTOSCOPY WITH LEFT URETEROSCOPY WITH HOLMIUM LASER LITHOTRIPSY, STONE MANIPULATION AND LEFT STENT REMOVAL performed by Blaise Ritchie MD at [facility] OR    CYSTOSCOPY Left 04/09/2022    CYSTOSCOPY LEFT URETERAL STENT INSERTION performed by Blaise Ritchie MD at [facility] OR    CYSTOSCOPY Left 06/25/2022    CYSTOSCOPY

## 2024-10-18 ENCOUNTER — TELEPHONE (OUTPATIENT)
Dept: ORTHOPEDIC SURGERY | Age: 59
End: 2024-10-18

## 2024-10-18 NOTE — TELEPHONE ENCOUNTER
SW patient. Informed him of the C-9 denial.     Sherry Luna [facility] Clinical Support    C9 DENIED FOR MRI RIGHT SHOULDER WO CONTRAST TREATMENT IS NOT MEDICALLY  NECESSARY. 10-- 11-.

## 2024-10-28 ENCOUNTER — PATIENT MESSAGE (OUTPATIENT)
Dept: FAMILY MEDICINE CLINIC | Age: 59
End: 2024-10-28

## 2024-10-28 RX ORDER — ALLOPURINOL 200 MG/1
200 TABLET ORAL DAILY
Qty: 90 TABLET | Refills: 1 | Status: SHIPPED | OUTPATIENT
Start: 2024-10-28

## 2024-10-28 RX ORDER — GLIMEPIRIDE 1 MG/1
TABLET ORAL
Qty: 90 TABLET | Refills: 1 | Status: SHIPPED | OUTPATIENT
Start: 2024-10-28

## 2024-10-28 RX ORDER — COLCHICINE 0.6 MG/1
0.6 TABLET ORAL 2 TIMES DAILY PRN
Qty: 30 TABLET | Refills: 3 | Status: SHIPPED | OUTPATIENT
Start: 2024-10-28

## 2024-10-28 RX ORDER — AMLODIPINE BESYLATE 5 MG/1
5 TABLET ORAL DAILY
Qty: 135 TABLET | Refills: 1 | Status: SHIPPED | OUTPATIENT
Start: 2024-10-28

## 2024-10-28 RX ORDER — NAPROXEN 500 MG/1
500 TABLET ORAL 2 TIMES DAILY WITH MEALS
Qty: 60 TABLET | Refills: 0 | Status: SHIPPED | OUTPATIENT
Start: 2024-10-28 | End: 2024-11-27

## 2024-10-28 RX ORDER — METFORMIN HYDROCHLORIDE 500 MG/1
TABLET, EXTENDED RELEASE ORAL
Qty: 90 TABLET | Refills: 1 | Status: SHIPPED | OUTPATIENT
Start: 2024-10-28

## 2024-10-28 RX ORDER — VALSARTAN 80 MG/1
80 TABLET ORAL DAILY
Qty: 90 TABLET | Refills: 1 | Status: SHIPPED | OUTPATIENT
Start: 2024-10-28

## 2024-11-01 ENCOUNTER — HOSPITAL ENCOUNTER (OUTPATIENT)
Age: 59
Discharge: HOME | End: 2024-11-01
Payer: COMMERCIAL

## 2024-11-01 DIAGNOSIS — N18.2 CKD (CHRONIC KIDNEY DISEASE), STAGE II: ICD-10-CM

## 2024-11-01 DIAGNOSIS — E11.22 TYPE 2 DM WITH CKD STAGE 2 AND HYPERTENSION (HCC): ICD-10-CM

## 2024-11-01 DIAGNOSIS — I12.9 TYPE 2 DM WITH CKD STAGE 2 AND HYPERTENSION (HCC): ICD-10-CM

## 2024-11-01 DIAGNOSIS — N20.0 CALCULUS OF KIDNEY: ICD-10-CM

## 2024-11-01 DIAGNOSIS — N18.2 TYPE 2 DM WITH CKD STAGE 2 AND HYPERTENSION (HCC): ICD-10-CM

## 2024-11-01 DIAGNOSIS — R80.1 PERSISTENT PROTEINURIA: ICD-10-CM

## 2024-11-01 DIAGNOSIS — I10 ESSENTIAL HYPERTENSION: ICD-10-CM

## 2024-11-01 DIAGNOSIS — E66.01 SEVERE OBESITY (BMI 35.0-39.9) WITH COMORBIDITY: ICD-10-CM

## 2024-11-01 LAB
ALBUMIN SERPL-MCNC: 4.1 G/DL (ref 3.4–5)
ANION GAP SERPL CALCULATED.3IONS-SCNC: 9 MMOL/L (ref 3–16)
BACTERIA URNS QL MICRO: ABNORMAL /HPF
BILIRUB UR QL STRIP.AUTO: NEGATIVE
BUN SERPL-MCNC: 21 MG/DL (ref 7–20)
CALCIUM SERPL-MCNC: 9.4 MG/DL (ref 8.3–10.6)
CHLORIDE SERPL-SCNC: 105 MMOL/L (ref 99–110)
CLARITY UR: CLEAR
CO2 SERPL-SCNC: 26 MMOL/L (ref 21–32)
COLOR UR: YELLOW
CREAT SERPL-MCNC: 1.1 MG/DL (ref 0.9–1.3)
CREAT UR-MCNC: 124 MG/DL (ref 39–259)
DEPRECATED RDW RBC AUTO: 14 % (ref 12.4–15.4)
EPI CELLS #/AREA URNS AUTO: 1 /HPF (ref 0–5)
GFR SERPLBLD CREATININE-BSD FMLA CKD-EPI: 78 ML/MIN/{1.73_M2}
GLUCOSE SERPL-MCNC: 229 MG/DL (ref 70–99)
GLUCOSE UR STRIP.AUTO-MCNC: NEGATIVE MG/DL
HCT VFR BLD AUTO: 45.2 % (ref 40.5–52.5)
HGB BLD-MCNC: 15.9 G/DL (ref 13.5–17.5)
HGB UR QL STRIP.AUTO: NEGATIVE
HYALINE CASTS #/AREA URNS AUTO: 2 /LPF (ref 0–8)
KETONES UR STRIP.AUTO-MCNC: NEGATIVE MG/DL
LEUKOCYTE ESTERASE UR QL STRIP.AUTO: NEGATIVE
MCH RBC QN AUTO: 34.6 PG (ref 26–34)
MCHC RBC AUTO-ENTMCNC: 35.3 G/DL (ref 31–36)
MCV RBC AUTO: 98.2 FL (ref 80–100)
NITRITE UR QL STRIP.AUTO: NEGATIVE
PH UR STRIP.AUTO: 5 [PH] (ref 5–8)
PHOSPHATE SERPL-MCNC: 2.9 MG/DL (ref 2.5–4.9)
PLATELET # BLD AUTO: 192 K/UL (ref 135–450)
PMV BLD AUTO: 9.3 FL (ref 5–10.5)
POTASSIUM SERPL-SCNC: 4.7 MMOL/L (ref 3.5–5.1)
PROT UR STRIP.AUTO-MCNC: 100 MG/DL
PROT UR-MCNC: 71.7 MG/DL
PROT/CREAT UR-RTO: 0.6 MG/DL
RBC # BLD AUTO: 4.6 M/UL (ref 4.2–5.9)
RBC CLUMPS #/AREA URNS AUTO: 0 /HPF (ref 0–4)
SODIUM SERPL-SCNC: 140 MMOL/L (ref 136–145)
SP GR UR STRIP.AUTO: 1.02 (ref 1–1.03)
UA DIPSTICK W REFLEX MICRO PNL UR: YES
URN SPEC COLLECT METH UR: ABNORMAL
UROBILINOGEN UR STRIP-ACNC: 0.2 E.U./DL
WBC # BLD AUTO: 5.4 K/UL (ref 4–11)
WBC #/AREA URNS AUTO: 0 /HPF (ref 0–5)

## 2024-11-01 PROCEDURE — 85027 COMPLETE CBC AUTOMATED: CPT

## 2024-11-01 PROCEDURE — 81001 URINALYSIS AUTO W/SCOPE: CPT

## 2024-11-01 PROCEDURE — 80069 RENAL FUNCTION PANEL: CPT

## 2024-11-01 PROCEDURE — 82570 ASSAY OF URINE CREATININE: CPT

## 2024-11-01 PROCEDURE — 84156 ASSAY OF PROTEIN URINE: CPT

## 2024-11-05 RX ORDER — COLCHICINE 0.6 MG/1
0.6 TABLET ORAL 2 TIMES DAILY PRN
Qty: 180 TABLET | Refills: 0 | Status: SHIPPED | OUTPATIENT
Start: 2024-11-05

## 2024-12-02 RX ORDER — NAPROXEN 500 MG/1
500 TABLET ORAL 2 TIMES DAILY WITH MEALS
Qty: 60 TABLET | Refills: 0 | Status: SHIPPED | OUTPATIENT
Start: 2024-12-02

## 2025-01-02 ENCOUNTER — OFFICE VISIT (OUTPATIENT)
Dept: FAMILY MEDICINE CLINIC | Age: 60
End: 2025-01-02

## 2025-01-02 VITALS
SYSTOLIC BLOOD PRESSURE: 122 MMHG | DIASTOLIC BLOOD PRESSURE: 76 MMHG | BODY MASS INDEX: 37.51 KG/M2 | OXYGEN SATURATION: 98 % | HEART RATE: 96 BPM | WEIGHT: 254 LBS | RESPIRATION RATE: 12 BRPM | TEMPERATURE: 97.9 F

## 2025-01-02 DIAGNOSIS — E11.29 TYPE 2 DIABETES MELLITUS WITH MICROALBUMINURIA, WITHOUT LONG-TERM CURRENT USE OF INSULIN (HCC): Primary | ICD-10-CM

## 2025-01-02 DIAGNOSIS — I10 ESSENTIAL HYPERTENSION: ICD-10-CM

## 2025-01-02 DIAGNOSIS — R80.9 TYPE 2 DIABETES MELLITUS WITH MICROALBUMINURIA, WITHOUT LONG-TERM CURRENT USE OF INSULIN (HCC): Primary | ICD-10-CM

## 2025-01-02 DIAGNOSIS — M10.9 GOUT, UNSPECIFIED CAUSE, UNSPECIFIED CHRONICITY, UNSPECIFIED SITE: ICD-10-CM

## 2025-01-02 DIAGNOSIS — E66.01 SEVERE OBESITY (BMI 35.0-39.9) WITH COMORBIDITY: ICD-10-CM

## 2025-01-02 DIAGNOSIS — Z12.5 SCREENING PSA (PROSTATE SPECIFIC ANTIGEN): ICD-10-CM

## 2025-01-02 ASSESSMENT — PATIENT HEALTH QUESTIONNAIRE - PHQ9
SUM OF ALL RESPONSES TO PHQ QUESTIONS 1-9: 0
1. LITTLE INTEREST OR PLEASURE IN DOING THINGS: NOT AT ALL
SUM OF ALL RESPONSES TO PHQ9 QUESTIONS 1 & 2: 0
SUM OF ALL RESPONSES TO PHQ QUESTIONS 1-9: 0
2. FEELING DOWN, DEPRESSED OR HOPELESS: NOT AT ALL

## 2025-01-02 NOTE — PROGRESS NOTES
Subjective   Patient ID: Toño Grewal is a 59 y.o. male.  CC: Patient presents for re-evaluation of chronic health problems including diabetes mellitus, and gout, hypertension and persistent shoulder issues..    HPI Pt is here for a follow up, med refill.  Patient did not have laboratory testing done prior to today's appointment.  He has had no further gout flareups although is good complaining of some bilateral elbow tenderness.  He continues under orthopedic care for Worker's Comp. injury in regards to right shoulder and is awaiting additional testing.  Patient states he switched jobs in the last month.  Otherwise he states he been compliant with the medications with no adverse reactions.  He occasionally does have a hypoglycemic episode but this is a rarity.    Eye exam current (within one year): No    Checks sugars at home: yes  Home blood sugar records: patient tests 3 time(s) per week  Any episodes of hypoglycemia? No    Current medication use: taking as prescribed  Medication side effects: none     Current diet: well balanced  Current exercise:moderately active     Review of Systems     Patient Active Problem List   Diagnosis    Essential hypertension    Obesity    Sleep apnea    Type 2 diabetes mellitus with microalbuminuria, without long-term current use of insulin (HCC)    Mild intermittent asthma without complication    Carpal tunnel syndrome of right wrist    Nephrolithiasis    Severe obesity (BMI 35.0-39.9) with comorbidity    Closed nondisplaced fracture of sternal end of right clavicle    Vitamin D deficiency    Gout    Tear of right rotator cuff       Outpatient Medications Marked as Taking for the 1/2/25 encounter (Office Visit) with Candido Colón MD   Medication Sig Dispense Refill    naproxen (NAPROSYN) 500 MG tablet TAKE 1 TABLET BY MOUTH TWICE A DAY WITH MEALS 60 tablet 0    colchicine (COLCRYS) 0.6 MG tablet TAKE 1 TABLET BY MOUTH 2 TIMES DAILY AS NEEDED (GOUT) 180 tablet 0

## 2025-02-10 RX ORDER — COLCHICINE 0.6 MG/1
0.6 TABLET ORAL 2 TIMES DAILY PRN
Qty: 180 TABLET | Refills: 0 | Status: SHIPPED | OUTPATIENT
Start: 2025-02-10

## 2025-02-10 NOTE — TELEPHONE ENCOUNTER
Medication:   Requested Prescriptions      No prescriptions requested or ordered in this encounter      Provider out of office.     Patient Phone Number: [phone] (home)     Last appt: 1/2/2025   Next appt: 5/2/2025    Last OARRS:        No data to display              PDMP Monitoring:    Last PDMP [place] as Reviewed ([place]):  Review User Review Instant Review Result   FRANKIE STUBBS 10/5/2023  5:31 AM Reviewed PDMP [1]     Preferred Pharmacy:   [facility] [address] -  [phone] - F [phone]  [facility] [address] [address]  Phone: [phone] Fax: [phone]    [facility] #[id number] [place], [place] - [address] [facility] [phone] - F [phone]  [address] [facility] [address]  Phone: [phone] Fax: [phone]    [facility]/pharmacy #[id number] - [place], [place] - [address] [facility] [phone] - F [phone]  [address] [facility] [address]  Phone: [phone] Fax: [phone]

## 2025-04-03 RX ORDER — VALSARTAN 80 MG/1
80 TABLET ORAL DAILY
Qty: 90 TABLET | Refills: 0 | Status: SHIPPED | OUTPATIENT
Start: 2025-04-03

## 2025-04-03 RX ORDER — METFORMIN HYDROCHLORIDE 500 MG/1
TABLET, EXTENDED RELEASE ORAL
Qty: 90 TABLET | Refills: 0 | Status: SHIPPED | OUTPATIENT
Start: 2025-04-03

## 2025-04-03 RX ORDER — COLCHICINE 0.6 MG/1
0.6 TABLET ORAL 2 TIMES DAILY PRN
Qty: 180 TABLET | Refills: 0 | OUTPATIENT
Start: 2025-04-03

## 2025-04-03 RX ORDER — COLCHICINE 0.6 MG/1
0.6 TABLET ORAL 2 TIMES DAILY PRN
Qty: 60 TABLET | Refills: 0 | OUTPATIENT
Start: 2025-04-03

## 2025-04-03 RX ORDER — NAPROXEN 500 MG/1
500 TABLET ORAL 2 TIMES DAILY WITH MEALS
Qty: 60 TABLET | Refills: 0 | Status: SHIPPED | OUTPATIENT
Start: 2025-04-03

## 2025-04-07 ENCOUNTER — OFFICE VISIT (OUTPATIENT)
Dept: FAMILY MEDICINE CLINIC | Age: 60
End: 2025-04-07

## 2025-04-07 VITALS — OXYGEN SATURATION: 97 % | TEMPERATURE: 98.4 F | HEART RATE: 80 BPM

## 2025-04-07 DIAGNOSIS — H60.501 ACUTE OTITIS EXTERNA OF RIGHT EAR, UNSPECIFIED TYPE: Primary | ICD-10-CM

## 2025-04-07 PROCEDURE — 99213 OFFICE O/P EST LOW 20 MIN: CPT | Performed by: STUDENT IN AN ORGANIZED HEALTH CARE EDUCATION/TRAINING PROGRAM

## 2025-04-07 RX ORDER — CIPROFLOXACIN AND DEXAMETHASONE 3; 1 MG/ML; MG/ML
4 SUSPENSION/ DROPS AURICULAR (OTIC) 2 TIMES DAILY
Qty: 7.5 ML | Refills: 0 | Status: SHIPPED | OUTPATIENT
Start: 2025-04-07 | End: 2025-04-14

## 2025-04-07 RX ORDER — HYDROCODONE BITARTRATE AND ACETAMINOPHEN 5; 325 MG/1; MG/1
1 TABLET ORAL EVERY 6 HOURS PRN
Qty: 12 TABLET | Refills: 0 | Status: SHIPPED | OUTPATIENT
Start: 2025-04-07 | End: 2025-04-10

## 2025-04-07 RX ORDER — COLCHICINE 0.6 MG/1
0.6 TABLET ORAL 2 TIMES DAILY PRN
Qty: 180 TABLET | Refills: 0 | OUTPATIENT
Start: 2025-04-07

## 2025-04-07 NOTE — TELEPHONE ENCOUNTER
Medication:   Requested Prescriptions      No prescriptions requested or ordered in this encounter      Upcoming appointment with Aimee NICOLE    Patient Phone Number: [phone] (home)     Last appt: 1/2/2025   Next appt: 4/7/2025    Last OARRS:        No data to display              PDMP Monitoring:    Last PDMP [place] as Reviewed ([place]):  Review User Review Instant Review Result   FRANKIE STUBBS 10/5/2023  5:31 AM Reviewed PDMP [1]     Preferred Pharmacy:   [facility] [address] [phone] - F [phone]  [address] [place] [address]  [facility] [address]  Phone: [phone] Fax: [phone]    [facility] #[id number] [place], [place] - [address] [facility] [phone] - F [phone]  [address] [facility] [address]  Phone: [phone] Fax: [phone]    [facility]/pharmacy #[id number] - [place], [place] - [address] [facility] [phone] - F [phone]  [address] [facility] [address]  Phone: [phone] Fax: [phone]

## 2025-04-07 NOTE — PROGRESS NOTES
MINERALS ORAL) solution Take 15 mLs by mouth daily      Potassium Citrate ER (UROCIT-K) 15 MEQ (1620 MG) TBCR extended release tablet TAKE 3 TABLETS BY MOUTH DAILY (Patient taking differently: Take 3 tablets by mouth daily 1 in the morning and 2 in evening) 270 tablet 1    azelastine (ASTELIN) 0.1 % nasal spray 1 spray by Nasal route 2 times daily Use in each nostril as directed (Patient taking differently: 1 spray by Nasal route as needed Use in each nostril as directed) 1 Bottle 5    aspirin 81 MG EC tablet Take 1 tablet by mouth daily      albuterol sulfate HFA (PROAIR HFA) 108 (90 Base) MCG/ACT inhaler Inhale 2 puffs into the lungs 4 times daily as needed for Wheezing 3 Inhaler 2    famotidine (PEPCID) 20 MG tablet Take 1 tablet by mouth as needed       No current facility-administered medications for this visit.     Allergies:  Allergies   Allergen Reactions    Ace Inhibitors Cough     COUGH      Pollen Extract        ROS and PHYSICAL:   ROS:  10 point ROS otherwise negative except as mentioned in the HPI    VITALS:  Vitals:    04/07/25 1531   Pulse: 80   Temp: 98.4 °F (36.9 °C)   TempSrc: Infrared   SpO2: 97%      There is no height or weight on file to calculate BMI.    PHYSICAL EXAM:  GENERAL: NAD, alert and oriented  HEENT: Head: NC/AT. Eyes: clear conjunctiva. Ears: TMs normal bilaterally; right ear canal severely erythematous, ear tender with jaw opening and external ear manipulation. Nose: normal, nasal congestion. Throat: caries on left bottom molar    NECK: Supple, no LAD  SKIN: Skin color, texture, and turgor normal. No rash.    ASSESSMENT & PLAN:     59 y.o. male presents to the office for a sick visit.    1. Acute otitis externa of right ear, unspecified type  - Symptoms consistent with acute otitis externa. Treat with ciprodex. Continue supportive care. Short course of Norco to help with pain as OTC analgesics have been ineffective.  - ciprofloxacin-dexAMETHasone (CIPRODEX) 0.3-0.1 % otic

## 2025-04-08 ENCOUNTER — PATIENT MESSAGE (OUTPATIENT)
Dept: FAMILY MEDICINE CLINIC | Age: 60
End: 2025-04-08

## 2025-04-08 RX ORDER — CIPROFLOXACIN 500 MG/1
500 TABLET, FILM COATED ORAL 2 TIMES DAILY
Qty: 10 TABLET | Refills: 0 | Status: SHIPPED | OUTPATIENT
Start: 2025-04-08 | End: 2025-04-13

## 2025-05-02 ENCOUNTER — HOSPITAL ENCOUNTER (OUTPATIENT)
Age: 60
Discharge: HOME | End: 2025-05-02
Payer: COMMERCIAL

## 2025-05-02 ENCOUNTER — OFFICE VISIT (OUTPATIENT)
Dept: FAMILY MEDICINE CLINIC | Age: 60
End: 2025-05-02

## 2025-05-02 VITALS
HEART RATE: 92 BPM | TEMPERATURE: 98.4 F | DIASTOLIC BLOOD PRESSURE: 82 MMHG | SYSTOLIC BLOOD PRESSURE: 130 MMHG | RESPIRATION RATE: 12 BRPM | OXYGEN SATURATION: 97 % | WEIGHT: 249.25 LBS | BODY MASS INDEX: 36.81 KG/M2

## 2025-05-02 DIAGNOSIS — R80.9 TYPE 2 DIABETES MELLITUS WITH MICROALBUMINURIA, WITHOUT LONG-TERM CURRENT USE OF INSULIN (HCC): ICD-10-CM

## 2025-05-02 DIAGNOSIS — M1A.09X0 IDIOPATHIC CHRONIC GOUT OF MULTIPLE SITES WITHOUT TOPHUS: ICD-10-CM

## 2025-05-02 DIAGNOSIS — E66.01 SEVERE OBESITY (BMI 35.0-39.9) WITH COMORBIDITY (HCC): ICD-10-CM

## 2025-05-02 DIAGNOSIS — E55.9 VITAMIN D DEFICIENCY: ICD-10-CM

## 2025-05-02 DIAGNOSIS — I10 ESSENTIAL HYPERTENSION: ICD-10-CM

## 2025-05-02 DIAGNOSIS — E11.9 TYPE 2 DIABETES MELLITUS WITHOUT COMPLICATION, WITHOUT LONG-TERM CURRENT USE OF INSULIN (HCC): Primary | ICD-10-CM

## 2025-05-02 DIAGNOSIS — Z12.5 SCREENING PSA (PROSTATE SPECIFIC ANTIGEN): ICD-10-CM

## 2025-05-02 DIAGNOSIS — E11.29 TYPE 2 DIABETES MELLITUS WITH MICROALBUMINURIA, WITHOUT LONG-TERM CURRENT USE OF INSULIN (HCC): ICD-10-CM

## 2025-05-02 DIAGNOSIS — N18.2 STAGE 2 CHRONIC KIDNEY DISEASE: ICD-10-CM

## 2025-05-02 DIAGNOSIS — Z13.220 SCREENING FOR HYPERLIPIDEMIA: ICD-10-CM

## 2025-05-02 DIAGNOSIS — E11.9 TYPE 2 DIABETES MELLITUS WITHOUT COMPLICATION, WITHOUT LONG-TERM CURRENT USE OF INSULIN (HCC): ICD-10-CM

## 2025-05-02 LAB
25(OH)D3 SERPL-MCNC: 20.6 NG/ML
ALBUMIN SERPL-MCNC: 4.2 G/DL (ref 3.4–5)
ALBUMIN/GLOB SERPL: 1.5 {RATIO} (ref 1.1–2.2)
ALP SERPL-CCNC: 54 U/L (ref 40–129)
ALT SERPL-CCNC: 49 U/L (ref 10–40)
ANION GAP SERPL CALCULATED.3IONS-SCNC: 10 MMOL/L (ref 3–16)
AST SERPL-CCNC: 31 U/L (ref 15–37)
BASOPHILS # BLD: 0.1 K/UL (ref 0–0.2)
BASOPHILS NFR BLD: 1 %
BILIRUB SERPL-MCNC: 0.6 MG/DL (ref 0–1)
BUN SERPL-MCNC: 20 MG/DL (ref 7–20)
CALCIUM SERPL-MCNC: 9.4 MG/DL (ref 8.3–10.6)
CHLORIDE SERPL-SCNC: 104 MMOL/L (ref 99–110)
CHOLEST SERPL-MCNC: 191 MG/DL (ref 0–199)
CO2 SERPL-SCNC: 24 MMOL/L (ref 21–32)
CREAT SERPL-MCNC: 1.1 MG/DL (ref 0.9–1.3)
CREAT UR-MCNC: 148 MG/DL (ref 39–259)
DEPRECATED RDW RBC AUTO: 13.8 % (ref 12.4–15.4)
EOSINOPHIL # BLD: 0.3 K/UL (ref 0–0.6)
EOSINOPHIL NFR BLD: 4.4 %
EST. AVERAGE GLUCOSE BLD GHB EST-MCNC: 142.7 MG/DL
GFR SERPLBLD CREATININE-BSD FMLA CKD-EPI: 78 ML/MIN/{1.73_M2}
GLUCOSE P FAST SERPL-MCNC: 213 MG/DL (ref 70–99)
HBA1C MFR BLD: 6.6 %
HCT VFR BLD AUTO: 48.9 % (ref 40.5–52.5)
HDLC SERPL-MCNC: 37 MG/DL (ref 40–60)
HGB BLD-MCNC: 17 G/DL (ref 13.5–17.5)
LDL CHOLESTEROL: 121 MG/DL
LYMPHOCYTES # BLD: 2.5 K/UL (ref 1–5.1)
LYMPHOCYTES NFR BLD: 38.4 %
MCH RBC QN AUTO: 33.7 PG (ref 26–34)
MCHC RBC AUTO-ENTMCNC: 34.7 G/DL (ref 31–36)
MCV RBC AUTO: 97.3 FL (ref 80–100)
MICROALBUMIN UR DL<=1MG/L-MCNC: 13.5 MG/DL
MICROALBUMIN/CREAT UR: 91.2 MG/G (ref 0–30)
MONOCYTES # BLD: 0.5 K/UL (ref 0–1.3)
MONOCYTES NFR BLD: 7.3 %
NEUTROPHILS # BLD: 3.2 K/UL (ref 1.7–7.7)
NEUTROPHILS NFR BLD: 48.9 %
PLATELET # BLD AUTO: 209 K/UL (ref 135–450)
PMV BLD AUTO: 9.6 FL (ref 5–10.5)
POTASSIUM SERPL-SCNC: 4.3 MMOL/L (ref 3.5–5.1)
PROT SERPL-MCNC: 7 G/DL (ref 6.4–8.2)
PSA SERPL DL<=0.01 NG/ML-MCNC: 0.77 NG/ML (ref 0–4)
RBC # BLD AUTO: 5.02 M/UL (ref 4.2–5.9)
SODIUM SERPL-SCNC: 138 MMOL/L (ref 136–145)
TRIGL SERPL-MCNC: 166 MG/DL (ref 0–150)
URATE SERPL-MCNC: 7.8 MG/DL (ref 3.5–7.2)
VLDLC SERPL CALC-MCNC: 33 MG/DL
WBC # BLD AUTO: 6.5 K/UL (ref 4–11)

## 2025-05-02 PROCEDURE — 80053 COMPREHEN METABOLIC PANEL: CPT

## 2025-05-02 PROCEDURE — 83036 HEMOGLOBIN GLYCOSYLATED A1C: CPT

## 2025-05-02 PROCEDURE — 80061 LIPID PANEL: CPT

## 2025-05-02 PROCEDURE — 84153 ASSAY OF PSA TOTAL: CPT

## 2025-05-02 PROCEDURE — 85025 COMPLETE CBC W/AUTO DIFF WBC: CPT

## 2025-05-02 PROCEDURE — 36415 COLL VENOUS BLD VENIPUNCTURE: CPT

## 2025-05-02 PROCEDURE — 82570 ASSAY OF URINE CREATININE: CPT

## 2025-05-02 PROCEDURE — 84550 ASSAY OF BLOOD/URIC ACID: CPT

## 2025-05-02 PROCEDURE — 82306 VITAMIN D 25 HYDROXY: CPT

## 2025-05-02 PROCEDURE — 82043 UR ALBUMIN QUANTITATIVE: CPT

## 2025-05-02 RX ORDER — COLCHICINE 0.6 MG/1
0.6 TABLET ORAL 2 TIMES DAILY PRN
Qty: 180 TABLET | Refills: 1 | Status: SHIPPED | OUTPATIENT
Start: 2025-05-02

## 2025-05-02 RX ORDER — COLCHICINE 0.6 MG/1
0.6 TABLET ORAL 2 TIMES DAILY PRN
Qty: 30 TABLET | Refills: 0 | Status: SHIPPED | OUTPATIENT
Start: 2025-05-02 | End: 2025-05-02

## 2025-05-02 RX ORDER — GLIMEPIRIDE 1 MG/1
TABLET ORAL
Qty: 90 TABLET | Refills: 1 | Status: SHIPPED | OUTPATIENT
Start: 2025-05-02

## 2025-05-02 RX ORDER — DICLOFENAC SODIUM 75 MG/1
75 TABLET, DELAYED RELEASE ORAL 2 TIMES DAILY
COMMUNITY
Start: 2025-04-01 | End: 2025-05-02 | Stop reason: CLARIF

## 2025-05-02 RX ORDER — AMLODIPINE BESYLATE 5 MG/1
5 TABLET ORAL DAILY
Qty: 90 TABLET | Refills: 1 | Status: SHIPPED | OUTPATIENT
Start: 2025-05-02

## 2025-05-02 RX ORDER — ALLOPURINOL 200 MG/1
200 TABLET ORAL DAILY
Qty: 90 TABLET | Refills: 1 | Status: SHIPPED | OUTPATIENT
Start: 2025-05-02

## 2025-05-02 SDOH — ECONOMIC STABILITY: FOOD INSECURITY: WITHIN THE PAST 12 MONTHS, YOU WORRIED THAT YOUR FOOD WOULD RUN OUT BEFORE YOU GOT MONEY TO BUY MORE.: NEVER TRUE

## 2025-05-02 SDOH — ECONOMIC STABILITY: FOOD INSECURITY: WITHIN THE PAST 12 MONTHS, THE FOOD YOU BOUGHT JUST DIDN'T LAST AND YOU DIDN'T HAVE MONEY TO GET MORE.: NEVER TRUE

## 2025-05-02 ASSESSMENT — ENCOUNTER SYMPTOMS
SHORTNESS OF BREATH: 0
NAUSEA: 0
COUGH: 0
DIARRHEA: 0
VOMITING: 0

## 2025-05-02 NOTE — PROGRESS NOTES
Toño MARK ANTHONY BhaktaUri  : 1965  Encounter date: 2025    This is a 59 y.o. male who presents with  Chief Complaint   Patient presents with    Follow-up     History of present illness:    HPI   Presents to clinic today to establish care with me - former Dr. Colón patient. He is treated for a few chronic health conditions.   T2DM  Due for repeat labs.  Has been checking readings at home - typically after eating can run in the 140s-160s.  Fasting is typically mid 90s.  Poor diet in general - job is unpredictable with hours - he has been working with the [employer] for 6 months. He does stay active at work.  Walks regularly at home for exercise.   HTN  Per patient well controlled.  Doesn't check blood pressure readings at home.  Compliant with medications.  Denies any side effects.  Denies any hypotensive episodes.  Denies any chest pain, heart palpitations, SOB w/exertion, leg swelling or headaches.  Gout  Due for repeat Uric acid.  Last gout flare approximately 5 months prior.  Takes the allopurinol regularly.   GERD  Stable.  Compliant with medications.  Denies any side effects.   CKD  Following with Nephrology.   Vit D Def  Takes a multivitamin.     Allergies   Allergen Reactions    Ace Inhibitors Cough     COUGH      Pollen Extract      Current Outpatient Medications   Medication Sig Dispense Refill    Allopurinol 200 MG TABS Take 200 mg by mouth daily 90 tablet 1    amLODIPine (NORVASC) 5 MG tablet Take 1 tablet by mouth daily 90 tablet 1    colchicine (COLCRYS) 0.6 MG tablet Take 1 tablet by mouth 2 times daily as needed (gout) 30 tablet 0    glimepiride (AMARYL) 1 MG tablet TAKE 1 TABLET BY MOUTH EVERY DAY IN THE MORNING- 90 tablet 1    naproxen (NAPROSYN) 500 MG tablet Take 1 tablet by mouth 2 times daily (with meals) 60 tablet 0    metFORMIN (GLUCOPHAGE-XR) 500 MG extended release tablet TAKE 1 TABLET BY MOUTH EVERY DAY 90 tablet 0    valsartan (DIOVAN) 80 MG tablet Take 1 tablet by mouth daily 90 tablet

## 2025-05-05 ENCOUNTER — RESULTS FOLLOW-UP (OUTPATIENT)
Dept: FAMILY MEDICINE CLINIC | Age: 60
End: 2025-05-05

## 2025-05-05 DIAGNOSIS — M1A.09X0 IDIOPATHIC CHRONIC GOUT OF MULTIPLE SITES WITHOUT TOPHUS: Primary | ICD-10-CM

## 2025-05-05 DIAGNOSIS — E55.9 VITAMIN D DEFICIENCY: ICD-10-CM

## 2025-05-05 RX ORDER — ALLOPURINOL 300 MG/1
300 TABLET ORAL DAILY
Qty: 90 TABLET | Refills: 0 | Status: SHIPPED | OUTPATIENT
Start: 2025-05-05

## 2025-05-05 RX ORDER — NAPROXEN 500 MG/1
500 TABLET ORAL 2 TIMES DAILY WITH MEALS
Qty: 60 TABLET | Refills: 2 | Status: SHIPPED | OUTPATIENT
Start: 2025-05-05

## 2025-05-05 RX ORDER — CHOLECALCIFEROL (VITAMIN D3) 25 MCG
1000 TABLET ORAL DAILY
Qty: 90 TABLET | Refills: 1 | Status: SHIPPED | OUTPATIENT
Start: 2025-05-05

## 2025-07-24 DIAGNOSIS — M1A.09X0 IDIOPATHIC CHRONIC GOUT OF MULTIPLE SITES WITHOUT TOPHUS: ICD-10-CM

## 2025-07-24 RX ORDER — ALLOPURINOL 300 MG/1
300 TABLET ORAL DAILY
Qty: 90 TABLET | Refills: 0 | Status: CANCELLED | OUTPATIENT
Start: 2025-07-24

## 2025-07-24 RX ORDER — ALLOPURINOL 300 MG/1
300 TABLET ORAL DAILY
Qty: 90 TABLET | Refills: 0 | Status: SHIPPED | OUTPATIENT
Start: 2025-07-24

## 2025-08-04 ENCOUNTER — OFFICE VISIT (OUTPATIENT)
Dept: FAMILY MEDICINE CLINIC | Age: 60
End: 2025-08-04
Payer: COMMERCIAL

## 2025-08-04 ENCOUNTER — HOSPITAL ENCOUNTER (OUTPATIENT)
Dept: OTHER | Age: 60
Discharge: HOME | End: 2025-08-04
Payer: COMMERCIAL

## 2025-08-04 VITALS
TEMPERATURE: 98.1 F | OXYGEN SATURATION: 95 % | DIASTOLIC BLOOD PRESSURE: 88 MMHG | BODY MASS INDEX: 35.88 KG/M2 | HEIGHT: 70 IN | WEIGHT: 250.6 LBS | HEART RATE: 88 BPM | SYSTOLIC BLOOD PRESSURE: 134 MMHG

## 2025-08-04 DIAGNOSIS — R80.9 TYPE 2 DIABETES MELLITUS WITH MICROALBUMINURIA, WITHOUT LONG-TERM CURRENT USE OF INSULIN (HCC): ICD-10-CM

## 2025-08-04 DIAGNOSIS — E11.9 TYPE 2 DIABETES MELLITUS WITHOUT COMPLICATION, WITHOUT LONG-TERM CURRENT USE OF INSULIN (HCC): ICD-10-CM

## 2025-08-04 DIAGNOSIS — J45.20 MILD INTERMITTENT ASTHMA WITHOUT COMPLICATION: ICD-10-CM

## 2025-08-04 DIAGNOSIS — Z00.00 ANNUAL PHYSICAL EXAM: ICD-10-CM

## 2025-08-04 DIAGNOSIS — J30.2 SEASONAL ALLERGIES: ICD-10-CM

## 2025-08-04 DIAGNOSIS — G89.29 CHRONIC RIGHT SHOULDER PAIN: ICD-10-CM

## 2025-08-04 DIAGNOSIS — E78.2 MIXED HYPERLIPIDEMIA: ICD-10-CM

## 2025-08-04 DIAGNOSIS — Z00.00 ANNUAL PHYSICAL EXAM: Primary | ICD-10-CM

## 2025-08-04 DIAGNOSIS — I10 ESSENTIAL HYPERTENSION: ICD-10-CM

## 2025-08-04 DIAGNOSIS — E55.9 VITAMIN D DEFICIENCY: ICD-10-CM

## 2025-08-04 DIAGNOSIS — M1A.09X0 IDIOPATHIC CHRONIC GOUT OF MULTIPLE SITES WITHOUT TOPHUS: ICD-10-CM

## 2025-08-04 DIAGNOSIS — M25.511 CHRONIC RIGHT SHOULDER PAIN: ICD-10-CM

## 2025-08-04 DIAGNOSIS — E11.29 TYPE 2 DIABETES MELLITUS WITH MICROALBUMINURIA, WITHOUT LONG-TERM CURRENT USE OF INSULIN (HCC): ICD-10-CM

## 2025-08-04 DIAGNOSIS — Z12.11 SCREENING FOR MALIGNANT NEOPLASM OF COLON: ICD-10-CM

## 2025-08-04 DIAGNOSIS — N18.2 STAGE 2 CHRONIC KIDNEY DISEASE: ICD-10-CM

## 2025-08-04 LAB
EST. AVERAGE GLUCOSE BLD GHB EST-MCNC: 137 MG/DL
HBA1C MFR BLD: 6.4 %
URATE SERPL-MCNC: 8 MG/DL (ref 3.5–7.2)

## 2025-08-04 PROCEDURE — 83036 HEMOGLOBIN GLYCOSYLATED A1C: CPT

## 2025-08-04 PROCEDURE — 99396 PREV VISIT EST AGE 40-64: CPT | Performed by: NURSE PRACTITIONER

## 2025-08-04 PROCEDURE — 36415 COLL VENOUS BLD VENIPUNCTURE: CPT

## 2025-08-04 PROCEDURE — 3079F DIAST BP 80-89 MM HG: CPT | Performed by: NURSE PRACTITIONER

## 2025-08-04 PROCEDURE — 84550 ASSAY OF BLOOD/URIC ACID: CPT

## 2025-08-04 PROCEDURE — 3075F SYST BP GE 130 - 139MM HG: CPT | Performed by: NURSE PRACTITIONER

## 2025-08-04 RX ORDER — AZELASTINE 1 MG/ML
1 SPRAY, METERED NASAL 2 TIMES DAILY
Qty: 1 EACH | Refills: 5 | Status: SHIPPED | OUTPATIENT
Start: 2025-08-04

## 2025-08-04 RX ORDER — ALBUTEROL SULFATE 90 UG/1
2 INHALANT RESPIRATORY (INHALATION) 4 TIMES DAILY PRN
Qty: 3 EACH | Refills: 2 | Status: SHIPPED | OUTPATIENT
Start: 2025-08-04

## 2025-08-04 RX ORDER — METFORMIN HYDROCHLORIDE 500 MG/1
TABLET, EXTENDED RELEASE ORAL
Qty: 90 TABLET | Refills: 2 | Status: SHIPPED | OUTPATIENT
Start: 2025-08-04

## 2025-08-04 RX ORDER — VALSARTAN 80 MG/1
80 TABLET ORAL DAILY
Qty: 90 TABLET | Refills: 2 | Status: SHIPPED | OUTPATIENT
Start: 2025-08-04

## 2025-08-07 ENCOUNTER — TELEPHONE (OUTPATIENT)
Dept: FAMILY MEDICINE CLINIC | Age: 60
End: 2025-08-07

## 2025-08-15 DIAGNOSIS — I10 ESSENTIAL HYPERTENSION: ICD-10-CM

## 2025-08-15 DIAGNOSIS — M1A.09X0 IDIOPATHIC CHRONIC GOUT OF MULTIPLE SITES WITHOUT TOPHUS: ICD-10-CM

## 2025-08-15 LAB — NONINV COLON CA DNA+OCC BLD SCRN STL QL: NEGATIVE

## 2025-08-15 RX ORDER — AMLODIPINE BESYLATE 5 MG/1
5 TABLET ORAL DAILY
Qty: 90 TABLET | Refills: 1 | Status: SHIPPED | OUTPATIENT
Start: 2025-08-15

## 2025-08-15 RX ORDER — POTASSIUM CITRATE 15 MEQ/1
45 TABLET, EXTENDED RELEASE ORAL DAILY
Qty: 270 TABLET | Refills: 1 | Status: SHIPPED | OUTPATIENT
Start: 2025-08-15

## 2025-08-15 RX ORDER — ALLOPURINOL 300 MG/1
300 TABLET ORAL DAILY
Qty: 90 TABLET | Refills: 0 | Status: SHIPPED | OUTPATIENT
Start: 2025-08-15

## (undated) DEVICE — Device: Brand: MEDEX

## (undated) DEVICE — WET SKIN PREP TRAY: Brand: MEDLINE INDUSTRIES, INC.

## (undated) DEVICE — GUIDEWIRE ENDOSCP L150CM DIA0.035IN TIP 3CM PTFE NIT

## (undated) DEVICE — POSITIONER HD W8XH4XL8.5IN RASPBERRY FOAM SLT

## (undated) DEVICE — GLOVE ORANGE PI 7   MSG9070

## (undated) DEVICE — BAG DRAINAGE NS

## (undated) DEVICE — SOLUTION IRRIGATION STRL H2O 1000 ML UROMATIC CONTAINER

## (undated) DEVICE — SOLUTION IV IRRIG WATER 1000ML POUR BRL 2F7114

## (undated) DEVICE — SYRINGE MED 10ML SLIP TIP BLNT FILL AND LUERLOCK DISP

## (undated) DEVICE — FIBER ENT HOLM LSR 200 MIC STRL LTX FRE

## (undated) DEVICE — GLOVE ORANGE PI 7 1/2   MSG9075

## (undated) DEVICE — CYSTO/BLADDER IRRIGATION SET, REGULATING CLAMP

## (undated) DEVICE — TRAY PREP DRY W/ PREM GLV 2 APPL 6 SPNG 2 UNDPD 1 OVERWRAP

## (undated) DEVICE — Z DUP USE 2522782 SOLUTION IRRIG 1000ML STRL H2O PLAS CONTAINER UROMATIC

## (undated) DEVICE — CYSTO PACK: Brand: MEDLINE INDUSTRIES, INC.

## (undated) DEVICE — SYRINGE MED 30ML STD CLR PLAS LUERLOCK TIP N CTRL DISP

## (undated) DEVICE — Device

## (undated) DEVICE — CATHETER URET 5FR L70CM OPN END SGL LUMN INJ HUB FLEXIMA